# Patient Record
Sex: MALE | Race: WHITE | NOT HISPANIC OR LATINO
[De-identification: names, ages, dates, MRNs, and addresses within clinical notes are randomized per-mention and may not be internally consistent; named-entity substitution may affect disease eponyms.]

---

## 2017-06-08 ENCOUNTER — RESULT REVIEW (OUTPATIENT)
Age: 67
End: 2017-06-08

## 2018-12-31 ENCOUNTER — TRANSCRIPTION ENCOUNTER (OUTPATIENT)
Age: 68
End: 2018-12-31

## 2019-01-10 ENCOUNTER — TRANSCRIPTION ENCOUNTER (OUTPATIENT)
Age: 69
End: 2019-01-10

## 2019-04-09 ENCOUNTER — TRANSCRIPTION ENCOUNTER (OUTPATIENT)
Age: 69
End: 2019-04-09

## 2019-04-23 ENCOUNTER — TRANSCRIPTION ENCOUNTER (OUTPATIENT)
Age: 69
End: 2019-04-23

## 2019-10-21 ENCOUNTER — APPOINTMENT (OUTPATIENT)
Dept: OTOLARYNGOLOGY | Facility: CLINIC | Age: 69
End: 2019-10-21

## 2020-04-25 ENCOUNTER — MESSAGE (OUTPATIENT)
Age: 70
End: 2020-04-25

## 2020-07-17 ENCOUNTER — APPOINTMENT (OUTPATIENT)
Dept: UROLOGY | Facility: CLINIC | Age: 70
End: 2020-07-17
Payer: COMMERCIAL

## 2020-07-17 VITALS
RESPIRATION RATE: 15 BRPM | TEMPERATURE: 96.1 F | WEIGHT: 235 LBS | HEIGHT: 68 IN | OXYGEN SATURATION: 96 % | BODY MASS INDEX: 35.61 KG/M2 | SYSTOLIC BLOOD PRESSURE: 144 MMHG | DIASTOLIC BLOOD PRESSURE: 82 MMHG | HEART RATE: 88 BPM

## 2020-07-17 DIAGNOSIS — Z87.438 PERSONAL HISTORY OF OTHER DISEASES OF MALE GENITAL ORGANS: ICD-10-CM

## 2020-07-17 DIAGNOSIS — K46.9 UNSPECIFIED ABDOMINAL HERNIA W/OUT OBSTRUCTION OR GANGRENE: ICD-10-CM

## 2020-07-17 PROCEDURE — 99204 OFFICE O/P NEW MOD 45 MIN: CPT

## 2020-07-17 NOTE — PHYSICAL EXAM
[General Appearance - Well Nourished] : well nourished [General Appearance - Well Developed] : well developed [Normal Appearance] : normal appearance [Edema] : no peripheral edema [General Appearance - In No Acute Distress] : no acute distress [Well Groomed] : well groomed [Respiration, Rhythm And Depth] : normal respiratory rhythm and effort [Exaggerated Use Of Accessory Muscles For Inspiration] : no accessory muscle use [Costovertebral Angle Tenderness] : no ~M costovertebral angle tenderness [Abdomen Soft] : soft [Abdomen Tenderness] : non-tender [Urinary Bladder Findings] : the bladder was normal on palpation [Urethral Meatus] : meatus normal [Penis Abnormality] : normal uncircumcised penis [No Prostate Nodules] : no prostate nodules [Testes Mass (___cm)] : there were no testicular masses [Prostate Tenderness] : the prostate was not tender [Scrotum] : the scrotum was normal [Prostate Enlarged] : was enlarged [Normal Station and Gait] : the gait and station were normal for the patient's age [] : no rash [Oriented To Time, Place, And Person] : oriented to person, place, and time [No Focal Deficits] : no focal deficits [Not Anxious] : not anxious [Mood] : the mood was normal [Affect] : the affect was normal [Inguinal Lymph Nodes Enlarged Bilaterally] : inguinal

## 2020-07-17 NOTE — HISTORY OF PRESENT ILLNESS
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 He has long-standing history of BPH. He has previous history of recurrent UTIs and voiding symptoms. In 2018, he underwent urolift procedure which initially helped with urinary flow. Now again his urinary flow is slow and he has noticed recurrence of urinary odor. Nocturia is minimal. Residual urine today was minimal. In 2017, PSA level was 1.9. Creatinine was 1.3. Multiple urine cultures previously showed Escherichia coli. Also he has mild erectile dysfunction and he is interested in using medications which could improve symptoms. He is otherwise healthy.

## 2020-07-17 NOTE — ASSESSMENT
[FreeTextEntry1] : Except for slow urinary stream, he does not have significant other voiding symptoms. Residual urine is minimal. We can hold off on using alpha-blocker therapy for now. Based on previous cultures, Bactrim was prescribed. Urine culture will be sent. We discussed the underlying mechanism for erections, pathophysiology of erectile dysfunction (ED) and treatment options. Role of smoking, diabetes, hypertension, hyperlipidemia, coronary artery disease and treatment for benign and malignant prostate conditions was discussed as some of the more common causes of ED. Exercise, weight loss and a healthy lifestyle can be beneficial. We discussed testosterone (T) and its possible link to increased desire for sexual activity, feeling energetic, muscle mass, etc. Low T as a cause for ED is less clear. Mechanism by which PDE-5 inhibitors improve erections was discussed. Medications in this category include Viagra, Levitra, Staxyn, Cialis and Stendra. As needed versus daily dosing was discussed. For now, he can start with generic Viagra starting at 40 mg. Followup in a few months to reassess response.

## 2020-07-17 NOTE — REVIEW OF SYSTEMS
[Shortness Of Breath] : shortness of breath [Urine Infection (bladder/kidney)] : bladder/kidney infection [Urine retention] : urine retention [Joint Pain] : joint pain [Negative] : Heme/Lymph [FreeTextEntry3] : Dribbling of urine

## 2020-07-19 LAB
APPEARANCE: ABNORMAL
BACTERIA: ABNORMAL
BILIRUBIN URINE: NEGATIVE
BLOOD URINE: NEGATIVE
COLOR: YELLOW
GLUCOSE QUALITATIVE U: NEGATIVE
HYALINE CASTS: 2 /LPF
KETONES URINE: NEGATIVE
LEUKOCYTE ESTERASE URINE: ABNORMAL
MICROSCOPIC-UA: NORMAL
NITRITE URINE: NEGATIVE
PH URINE: 6
PROTEIN URINE: NORMAL
RED BLOOD CELLS URINE: 1 /HPF
SPECIFIC GRAVITY URINE: 1.03
SQUAMOUS EPITHELIAL CELLS: 3 /HPF
UROBILINOGEN URINE: NORMAL
WHITE BLOOD CELLS URINE: 30 /HPF

## 2020-07-20 ENCOUNTER — APPOINTMENT (OUTPATIENT)
Dept: ULTRASOUND IMAGING | Facility: CLINIC | Age: 70
End: 2020-07-20
Payer: COMMERCIAL

## 2020-07-20 ENCOUNTER — OUTPATIENT (OUTPATIENT)
Dept: OUTPATIENT SERVICES | Facility: HOSPITAL | Age: 70
LOS: 1 days | End: 2020-07-20

## 2020-07-20 ENCOUNTER — TRANSCRIPTION ENCOUNTER (OUTPATIENT)
Age: 70
End: 2020-07-20

## 2020-07-20 DIAGNOSIS — N39.0 URINARY TRACT INFECTION, SITE NOT SPECIFIED: ICD-10-CM

## 2020-07-20 PROCEDURE — 76775 US EXAM ABDO BACK WALL LIM: CPT | Mod: 26

## 2020-07-23 LAB — BACTERIA UR CULT: ABNORMAL

## 2020-08-06 LAB — BACTERIA UR CULT: ABNORMAL

## 2020-08-11 ENCOUNTER — APPOINTMENT (OUTPATIENT)
Dept: ORTHOPEDIC SURGERY | Facility: CLINIC | Age: 70
End: 2020-08-11
Payer: COMMERCIAL

## 2020-08-11 VITALS
TEMPERATURE: 98 F | SYSTOLIC BLOOD PRESSURE: 127 MMHG | BODY MASS INDEX: 36.37 KG/M2 | WEIGHT: 240 LBS | DIASTOLIC BLOOD PRESSURE: 76 MMHG | HEART RATE: 76 BPM | HEIGHT: 68 IN

## 2020-08-11 DIAGNOSIS — M75.42 IMPINGEMENT SYNDROME OF LEFT SHOULDER: ICD-10-CM

## 2020-08-11 PROCEDURE — 73030 X-RAY EXAM OF SHOULDER: CPT | Mod: LT

## 2020-08-11 PROCEDURE — 99203 OFFICE O/P NEW LOW 30 MIN: CPT | Mod: 25

## 2020-08-11 PROCEDURE — 20610 DRAIN/INJ JOINT/BURSA W/O US: CPT | Mod: LT

## 2020-08-12 PROBLEM — M75.42 SHOULDER IMPINGEMENT, LEFT: Status: ACTIVE | Noted: 2020-08-11

## 2020-08-12 NOTE — HISTORY OF PRESENT ILLNESS
[de-identified] : 68 y/o RHD male who works in [place] presents with left shoulder pain for many years ago with worsening pain for about the last 3 months. He denies any injury or trauma. He has decreased ROM. He has no pain at rest and increases with certain motions. The pain does NOT keep him up at night. He was taking Advil which did not help. He does some neck issues which does cause some tingling in his fingers. He denies any other deficits.

## 2020-08-12 NOTE — DISCUSSION/SUMMARY
[de-identified] : 70 y/o male with left shoulder pain secondary to shoulder impingement/bursitis. A lengthy discussion was held regarding the patient’s condition and treatment options including all risks, benefits, prognosis and outcomes of each were discussed in detail. The patient would like to continue with conservative management at this time and was advised on the use of NSAIDS for pain control, icing, activity modification. We also performed a cortisone injection today to both the glenohumeral and subacromial spaces. He will under PT and was given an Rx today. The patient will contact me if there are any concerns otherwise follow up will be on a prn basis. He will call in 4-8 weeks depending on his improvement. If he has continued symptoms we will order an MRI for him. The patient express understanding and all questions were answered.\par

## 2020-08-12 NOTE — PHYSICAL EXAM
[UE] : Sensory: Intact in bilateral upper extremities [B.R.] : biceps 2+ and symmetric bilaterally [Rad] : radial 2+ and symmetric bilaterally [de-identified] : Pt is pleasant 68 yo male, AAOx3 with no apparent distress, 36 BMI.  Physical examination of both shoulders reveals normal contours with no deformity, skin intact, with no signs of infection, no erythema, no swelling, no discoloration, no distal lymphedema, no patholaxity, no muscle atrophy noted. No tenderness to palpation. ROM of left shoulder reveals forward flexion to 130°,  external rotation 35°,  IR to L1. Motor strength 5/5 in ER, IR, and FF in the scapular plane without pain. Speeds negative. No neurological deficits noted.\par \par  [de-identified] : X-rays were ordered, obtained, and interpreted by me today: 4 views of the left shoulder demonstrate type III acromion, no arthritis, mild AC narrowing, diminished supraspinatus outlet, with no acute fracture or dislocation.\par

## 2020-08-12 NOTE — PROCEDURE
[de-identified] : After careful discussion regarding the risks versus benefits of a corticosteroid injection the patient has elected to proceed with that treatment. Under sterile conditions, the patient received  a left shoulder injection (half into the subacromial space and half into the glenohumeral joint space) with a total of 8 cc of half percent Marcaine without epinephrine and 2 cc of Betamethasone. The site was cleaned and a bandaid was applied. The patient tolerated the injection without problem.\par \par

## 2020-08-30 LAB — BACTERIA UR CULT: NORMAL

## 2020-09-02 LAB — BACTERIA UR CULT: NORMAL

## 2020-09-08 ENCOUNTER — TRANSCRIPTION ENCOUNTER (OUTPATIENT)
Age: 70
End: 2020-09-08

## 2020-09-16 ENCOUNTER — RESULT REVIEW (OUTPATIENT)
Age: 70
End: 2020-09-16

## 2020-10-04 ENCOUNTER — TRANSCRIPTION ENCOUNTER (OUTPATIENT)
Age: 70
End: 2020-10-04

## 2020-10-06 ENCOUNTER — TRANSCRIPTION ENCOUNTER (OUTPATIENT)
Age: 70
End: 2020-10-06

## 2020-10-08 ENCOUNTER — APPOINTMENT (OUTPATIENT)
Dept: UROLOGY | Facility: CLINIC | Age: 70
End: 2020-10-08
Payer: COMMERCIAL

## 2020-10-08 VITALS
HEIGHT: 68 IN | SYSTOLIC BLOOD PRESSURE: 119 MMHG | TEMPERATURE: 97.6 F | OXYGEN SATURATION: 93 % | WEIGHT: 240 LBS | DIASTOLIC BLOOD PRESSURE: 80 MMHG | HEART RATE: 78 BPM | RESPIRATION RATE: 14 BRPM | BODY MASS INDEX: 36.37 KG/M2

## 2020-10-08 PROCEDURE — 51701 INSERT BLADDER CATHETER: CPT

## 2020-10-08 PROCEDURE — 99213 OFFICE O/P EST LOW 20 MIN: CPT | Mod: 25

## 2020-10-08 RX ORDER — NITROFURANTOIN (MONOHYDRATE/MACROCRYSTALS) 25; 75 MG/1; MG/1
100 CAPSULE ORAL
Qty: 14 | Refills: 0 | Status: DISCONTINUED | COMMUNITY
Start: 2020-08-06 | End: 2020-10-08

## 2020-10-08 RX ORDER — SULFAMETHOXAZOLE AND TRIMETHOPRIM 800; 160 MG/1; MG/1
800-160 TABLET ORAL
Qty: 10 | Refills: 0 | Status: DISCONTINUED | COMMUNITY
Start: 2020-07-17 | End: 2020-10-08

## 2020-10-08 NOTE — PHYSICAL EXAM
[General Appearance - Well Developed] : well developed [General Appearance - Well Nourished] : well nourished [Normal Appearance] : normal appearance [Well Groomed] : well groomed [General Appearance - In No Acute Distress] : no acute distress [Abdomen Soft] : soft [Abdomen Tenderness] : non-tender [Costovertebral Angle Tenderness] : no ~M costovertebral angle tenderness [Urethral Meatus] : meatus normal [Penis Abnormality] : normal uncircumcised penis [Urinary Bladder Findings] : the bladder was normal on palpation [Scrotum] : the scrotum was normal [Testes Mass (___cm)] : there were no testicular masses [Prostate Tenderness] : the prostate was not tender [No Prostate Nodules] : no prostate nodules [Prostate Enlarged] : was enlarged [FreeTextEntry1] : COLTON done July 2020 [] : no respiratory distress [Respiration, Rhythm And Depth] : normal respiratory rhythm and effort [Exaggerated Use Of Accessory Muscles For Inspiration] : no accessory muscle use [Oriented To Time, Place, And Person] : oriented to person, place, and time [Affect] : the affect was normal [Mood] : the mood was normal [Not Anxious] : not anxious

## 2020-10-08 NOTE — ASSESSMENT
[FreeTextEntry1] : Under sterile condition, only a 12 Fr catheter could be passed and urine was sent for culture. He will be scheduled for cystoscopy. We will discuss antibiotic therapy on the phone when culture results come back. Will consider low dose, longer term antibiotics.

## 2020-10-12 LAB — BACTERIA UR CULT: ABNORMAL

## 2020-10-21 ENCOUNTER — TRANSCRIPTION ENCOUNTER (OUTPATIENT)
Age: 70
End: 2020-10-21

## 2020-10-22 ENCOUNTER — APPOINTMENT (OUTPATIENT)
Dept: UROLOGY | Facility: CLINIC | Age: 70
End: 2020-10-22
Payer: COMMERCIAL

## 2020-10-22 VITALS
HEART RATE: 77 BPM | WEIGHT: 240 LBS | HEIGHT: 68 IN | DIASTOLIC BLOOD PRESSURE: 63 MMHG | SYSTOLIC BLOOD PRESSURE: 131 MMHG | TEMPERATURE: 97.6 F | OXYGEN SATURATION: 95 % | RESPIRATION RATE: 15 BRPM | BODY MASS INDEX: 36.37 KG/M2

## 2020-10-22 PROCEDURE — 52281 CYSTOSCOPY AND TREATMENT: CPT

## 2020-10-22 PROCEDURE — 99072 ADDL SUPL MATRL&STAF TM PHE: CPT

## 2021-05-12 ENCOUNTER — TRANSCRIPTION ENCOUNTER (OUTPATIENT)
Age: 71
End: 2021-05-12

## 2021-08-05 ENCOUNTER — NON-APPOINTMENT (OUTPATIENT)
Age: 71
End: 2021-08-05

## 2021-08-06 ENCOUNTER — NON-APPOINTMENT (OUTPATIENT)
Age: 71
End: 2021-08-06

## 2021-08-06 ENCOUNTER — APPOINTMENT (OUTPATIENT)
Dept: SPINE | Facility: CLINIC | Age: 71
End: 2021-08-06
Payer: COMMERCIAL

## 2021-08-06 VITALS
BODY MASS INDEX: 36.37 KG/M2 | DIASTOLIC BLOOD PRESSURE: 84 MMHG | OXYGEN SATURATION: 95 % | WEIGHT: 240 LBS | SYSTOLIC BLOOD PRESSURE: 143 MMHG | HEIGHT: 68 IN | HEART RATE: 67 BPM

## 2021-08-06 PROCEDURE — 99203 OFFICE O/P NEW LOW 30 MIN: CPT

## 2021-08-06 NOTE — HISTORY OF PRESENT ILLNESS
[< 3 months] : less than 3 months [FreeTextEntry1] : Recurrent Neck and Left arm pain x 1 week [de-identified] : Mr. Jimmie Perez is here for comprehensive evaluation of Cervical Spine. He states that his initial \par symptoms started in January in 2019. He denies any Neck injury. He reports that his pain was resolved and managed with gabapentin and physical therapy over the past few years. Today he reports recurrent neck and left arm pain over the past week. He describes the pain as deep sharp;LEft arm pain and chest radiation and numbness in fingers. Pain is continuous ; Pain Rate 5/10 He currently takes Aleve for pain control. He reports that the pain is impacting his sleep with minimal relief. he reports that he does not have a PCP and had not had a workup for the pain that radiates into his chest. he has not had PT and does not wish to start at this time for fear that PT will exacerbate his pain. \par \par His neurological exam is normal\par  \par \par

## 2021-08-06 NOTE — ASSESSMENT
[FreeTextEntry1] : 70 year old man presenting with cervical radiculopathy\par \par MRI Cervical spine to R/O Cervical disc herniation\par Cervical Xray to rule cervical instability\par \par Does not want to start PT. \par Instructed to follow up with PCP for comprehensive workup.\par  Medrol does pack Prescribed\par Medication Purpose, Action, Possible interactions, Side effects as well as adverse effects explain to pt.\par Teachback Protocol implemented.\par Follow up with Dr. Jens Colon when Completed\par \par

## 2021-08-06 NOTE — PHYSICAL EXAM
[General Appearance - Alert] : alert [General Appearance - In No Acute Distress] : in no acute distress [Oriented To Time, Place, And Person] : oriented to person, place, and time [Impaired Insight] : insight and judgment were intact [Cranial Nerves Optic (II)] : visual acuity intact bilaterally,  pupils equal round and reactive to light [Cranial Nerves Oculomotor (III)] : extraocular motion intact [Cranial Nerves Trigeminal (V)] : facial sensation intact symmetrically [Cranial Nerves Facial (VII)] : face symmetrical [Cranial Nerves Vestibulocochlear (VIII)] : hearing was intact bilaterally [Cranial Nerves Glossopharyngeal (IX)] : tongue and palate midline [Cranial Nerves Accessory (XI - Cranial And Spinal)] : head turning and shoulder shrug symmetric [Cranial Nerves Hypoglossal (XII)] : there was no tongue deviation with protrusion [Sclera] : the sclera and conjunctiva were normal [] : no respiratory distress [Heart Rate And Rhythm] : heart rate was normal and rhythm regular [Abdomen Soft] : soft [Abnormal Walk] : normal gait [Skin Color & Pigmentation] : normal skin color and pigmentation [Neck Appearance] : the appearance of the neck was normal

## 2021-08-12 ENCOUNTER — RESULT REVIEW (OUTPATIENT)
Age: 71
End: 2021-08-12

## 2021-08-12 ENCOUNTER — APPOINTMENT (OUTPATIENT)
Dept: SPINE | Facility: CLINIC | Age: 71
End: 2021-08-12
Payer: COMMERCIAL

## 2021-08-12 VITALS
OXYGEN SATURATION: 95 % | SYSTOLIC BLOOD PRESSURE: 135 MMHG | HEART RATE: 87 BPM | DIASTOLIC BLOOD PRESSURE: 88 MMHG | BODY MASS INDEX: 36.37 KG/M2 | HEIGHT: 68 IN | WEIGHT: 240 LBS

## 2021-08-12 PROCEDURE — 99213 OFFICE O/P EST LOW 20 MIN: CPT

## 2021-08-13 NOTE — DATA REVIEWED
[de-identified] : Cervical MRI from [facility]   8/7/2021  [de-identified] : Cervical Xrays from [facility]

## 2021-08-13 NOTE — REASON FOR VISIT
[Follow-Up: _____] : a [unfilled] follow-up visit [Family Member] : family member [FreeTextEntry1] : \par Mr Perez is a 70-year-old gentleman here for left sided cervical radiculopathy.  He had an episode in 2017 of the same pain and now has returned about three weeks ago.  Positive coordination issues.    No gait disturbances.  No urine or bowel dysfunction.  PT  was effective in the past in 2019.   He is not  on  Gabapentin.   A Medrol dose ira was not effective.  His pain is progressing, rated a 7/10.  He has difficulty sleeping.  Even at rest the pain is severe. He also feels his left arm is weaker. An MRI of the cervical spine shows a developmentally narrow canal with disc osteophyte complexes resulting in moderate central and foraminal stenosis at C3-C7. On the left at C6-6-7 there is more significant left-sided cord impingement. There is T2 signal change at the C3-4 level. He is also noted dysphasia with certain thin liquids.

## 2021-08-13 NOTE — ASSESSMENT
[FreeTextEntry1] : Left sided cervical radiculopathy .   On neurologic examination he has limited ROM of neck and a positive ojeda sign on the right side.   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  Surgery was recommended.  He understands that any weakness that occurs can be permanent.  A multilevel laminectomy was discussed . Risks and  limited ROM postop was explained.   He will need a barium swallow test  prior to surgery due to his swallowing difficulty, and consideration for a posterior fusion was discussed.  He will return once the images are complete.  Gabapentin was ordered at 300 mg three times a day, side effects discussed.

## 2021-08-13 NOTE — PHYSICAL EXAM
[Person] : oriented to person [Place] : oriented to place [Time] : oriented to time [Short Term Intact] : short term memory intact [Remote Intact] : remote memory intact [Span Intact] : the attention span was normal [Concentration Intact] : normal concentrating ability [Fluency] : fluency intact [Comprehension] : comprehension intact [Current Events] : adequate knowledge of current events [Past History] : adequate knowledge of personal past history [Vocabulary] : adequate range of vocabulary [Cranial Nerves Optic (II)] : visual acuity intact bilaterally,  pupils equal round and reactive to light [Cranial Nerves Oculomotor (III)] : extraocular motion intact [Cranial Nerves Trigeminal (V)] : facial sensation intact symmetrically [Cranial Nerves Facial (VII)] : face symmetrical [Cranial Nerves Vestibulocochlear (VIII)] : hearing was intact bilaterally [Cranial Nerves Glossopharyngeal (IX)] : tongue and palate midline [Cranial Nerves Accessory (XI - Cranial And Spinal)] : head turning and shoulder shrug symmetric [Cranial Nerves Hypoglossal (XII)] : there was no tongue deviation with protrusion [Motor Tone] : muscle tone was normal in all four extremities [No Muscle Atrophy] : normal bulk in all four extremities [Sensation Tactile Decrease] : light touch was intact [Abnormal Walk] : normal gait [Balance] : balance was intact [2+] : Ankle jerk left 2+ [Kelly] : Kelly's sign was demonstrated [No Tenderness to Palpation] : no spine tenderness on palpation [Past-pointing] : there was no past-pointing [Tremor] : no tremor present [FreeTextEntry6] : mild left triceps and  weakness 4/5 [L'Hermitte's] : neck flexion did not produce tingling down the spine/arms [Spurling's - Opposite Side] : Negative Spurling's on opposite side [Spurling's Same Side] : Negative Spurling's on same side [FreeTextEntry1] : significantly limited extension

## 2021-08-14 ENCOUNTER — APPOINTMENT (OUTPATIENT)
Dept: MRI IMAGING | Facility: CLINIC | Age: 71
End: 2021-08-14

## 2021-08-14 ENCOUNTER — APPOINTMENT (OUTPATIENT)
Dept: RADIOLOGY | Facility: CLINIC | Age: 71
End: 2021-08-14

## 2021-08-16 ENCOUNTER — OUTPATIENT (OUTPATIENT)
Dept: OUTPATIENT SERVICES | Facility: HOSPITAL | Age: 71
LOS: 1 days | End: 2021-08-16
Payer: COMMERCIAL

## 2021-08-16 ENCOUNTER — APPOINTMENT (OUTPATIENT)
Dept: CT IMAGING | Facility: CLINIC | Age: 71
End: 2021-08-16
Payer: COMMERCIAL

## 2021-08-16 DIAGNOSIS — R20.0 ANESTHESIA OF SKIN: ICD-10-CM

## 2021-08-16 DIAGNOSIS — M54.12 RADICULOPATHY, CERVICAL REGION: ICD-10-CM

## 2021-08-16 PROCEDURE — 72125 CT NECK SPINE W/O DYE: CPT

## 2021-08-16 PROCEDURE — 72125 CT NECK SPINE W/O DYE: CPT | Mod: 26

## 2021-08-18 ENCOUNTER — NON-APPOINTMENT (OUTPATIENT)
Age: 71
End: 2021-08-18

## 2021-08-18 ENCOUNTER — OUTPATIENT (OUTPATIENT)
Dept: OUTPATIENT SERVICES | Facility: HOSPITAL | Age: 71
LOS: 1 days | End: 2021-08-18
Payer: COMMERCIAL

## 2021-08-18 DIAGNOSIS — R20.0 ANESTHESIA OF SKIN: ICD-10-CM

## 2021-08-18 DIAGNOSIS — M54.12 RADICULOPATHY, CERVICAL REGION: ICD-10-CM

## 2021-08-18 PROCEDURE — 74230 X-RAY XM SWLNG FUNCJ C+: CPT | Mod: 26

## 2021-08-18 PROCEDURE — 92611 MOTION FLUOROSCOPY/SWALLOW: CPT

## 2021-08-18 PROCEDURE — 74230 X-RAY XM SWLNG FUNCJ C+: CPT

## 2021-08-18 NOTE — ASSESSMENT
[FreeTextEntry1] : MODIFIED BARIUM SWALLOW STUDY     \par \par Date of Report: 08/18/2021     \par Date of Evaluation: 08/18/2021     \par Patient Name: Aung Perez \par YOB: 1950 \par Date of Onset: ongoing \par Primary Diagnosis: Dysphagia      \par Treatment Diagnosis: Dysphagia     \par Referring Physician: Dr. Jens Colon \par \par Impressions/Results:      \par 1. There was no penetration and/or aspiration pre/during/post swallow across all PO. \par 2. Patient demonstrated coughing episode when consuming pill with thin liquids, likely due to observed inhalation with discoordination of breathing/swallowing. Consider taking pills via applesauce. \par 3. There was adequate pharyngeal clearance post swallow across all PO. \par \par Recommendations:     \par 1. Regular solids with thin liquids. \par 2. Aspiration precautions. \par 3. Safe swallowing guidelines: position upright 90 degrees, small bite/sip, complete full mastication of solid textures, pace meal slowly, remain upright 15-30 minutes post meal.  \par 4. Whole pills in applesauce. \par 5. Ongoing oral care. \par 6. Follow up with referring physician, as directed. \par \par History: This 70 year old male was seen this morning for a Modified Barium Swallow Study to: _x__rule out aspiration; __x_assess for diet texture change as appropriate; and/or _x__ explore positional strategies and/or compensatory techniques to eliminate penetration/aspiration. The physician ordered this procedure because he wants the patient to meet their nutrition/hydration needs by mouth without compromising respiratory status.    \par \par The patient independently arrived to today’s study and served as a reliable informant. As per charting and patient report, patient is undergoing work up for left sided cervical radiculopathy. Patient reported he has been experiencing intermittent difficulty swallowing thin liquids marked by intermittent coughing episodes upon consecutive sips or when inattentive. Patient reported this to his MD, who requested patient receive MBS for further assessment of oropharyngeal swallow mechanism prior to planned surgical intervention with neurosurgery team. Patient denied change in breathing or vocal quality, pharyngeal stasis, odynophagia, and history of pneumonia, unintentional weight loss, and reflux. Patient reported he has continued to consume a regular solid diet with thin liquids with being more attentive to deglutition. Patient denied difficulty swallowing pills. \par \par Current Nutritional Intake:  \par 1. solids: regular \par 2. 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 Patient was ambulatory, but requested to sit in MBS chair secondary to unsteadiness and was placed in lateral view. Patient was able to  anterior plane of view for esophageal screen. Patient was awake and cooperative and able to follow simple commands for purposes of study. Patient’s vocal quality/intensity was WFL. Patient was tolerating room air with adequate secretion management. Patient denied pain pre and post study. \par \par Consistencies Administered:      \par Solids: _x_ Regular solid __Soft solid _x__Puree      \par Liquids: _x_ Thin _x_ Nectar Thick __Honey Thick      \par _x_ single cup sips _x_ consecutive cup sips   __ teaspoon \par \par SUMMARY & IMPRESSION  \par \par Preliminary Fluoroscopy reveals:     \par 1. Patient demonstrated adequate velopharyngeal closure.   \par 2. Patient demonstrated a timely pharyngeal swallow trigger with adequate hyolaryngeal elevation/excursion.    \par \par Videofluoroscopic Evaluation Reveals:   \par 1. Mild oral dysphagia when given thin liquids marked by adequate retrieval and containment, reduced bolus cohesion resulting in premature spillage to hypopharynx, timely posterior transfer and oral transit, and adequate oral clearance post swallow. \par 2. Functional oral phase when given nectar thick liquids, puree, and regular solids marked by adequate retrieval and containment, timely mastication of solids, adequate bolus cohesion, timely posterior transfer and oral transit, and adequate oral clearance post swallow.  \par 3. Functional pharyngeal phase when given thin liquids, nectar thick liquids, puree, and regular solids marked by timely pharyngeal swallow trigger (status post premature spillage to the level of the pyriforms with thin liquids), adequate BOT retraction, adequate hyolaryngeal elevation/excursion, and complete epiglottic retroflexion. There was no penetration and/or aspiration pre/during/post swallow. There was trace pharyngeal residue at the level of the valleculae noted with regular solids, which patient exhibited immediate spontaneous additional dry swallow with adequate clearance. There was adequate pharyngeal clearance post swallow when given thin liquids, nectar thick liquids, and puree.  \par \par Esophageal screen: Patient was given a regular solid bolus and barium tablet. Both the regular solid bolus and tablet were observed to course from the oral cavity to the level of the stomach without hold up, per radiologist report. This cannot be considered a formal evaluation of the esophagus. It should be noted that patient exhibited coughing episode when consuming barium tablet with water. Patient was observed to inhale upon consumption of pill, which likely led to discoordination of breathing/swallowing and suspected aspiration event. Patient reported this normally does not occur when he is consuming pills. Patient was advised to monitor closely at home and consider implementing whole pills in applesauce if reoccurrence. Patient verbalized understanding and is in agreement with plan of care. \par \par Aspiration - Penetration Scale: 1- thin liquids via single/consecutive cup sips, nectar thick liquids, puree, and regular solids \par \par Aspiration - Penetration Scale (Milad et al Dysphagia 11:93-98 (April 1996), Aspiration-Penetration Scale)     \par 1. Material does not enter the airway     \par 2. Material enters the airway, remains above the vocal folds, and is ejected from the airway     \par 3. Material enters the airway, remains above the vocal folds, and is not ejected     \par 4. Material enters the airway, contacts the vocal folds, and is ejected from the airway     \par 5. Material enters the airway, contacts the vocal folds, and is not ejected from the airway     \par 6. Material enters the airway, passes below the vocal folds and is ejected into the larynx or out of the airway     \par 7. Material enters the airway, passes below the vocal folds, and is not ejected from the trachea despite effort     \par 8. Material enters the airway, passes below the vocal folds, and no effort is made to eject     \par \par      \par The above results and recommendations have been discussed in length with the patient, who verbalized understanding and is in agreement with plan of care. Should you have any additional concerns, please contact the Center at [phone].     \par \par      \par \par Zenaida Mcmanus M.S., CCC-SLP     \par Speech-Language Pathologist     \par [facility] [place].

## 2021-08-26 ENCOUNTER — EMERGENCY (EMERGENCY)
Facility: HOSPITAL | Age: 71
LOS: 1 days | Discharge: HOME | End: 2021-08-26
Attending: EMERGENCY MEDICINE
Payer: COMMERCIAL

## 2021-08-26 VITALS
SYSTOLIC BLOOD PRESSURE: 151 MMHG | HEART RATE: 99 BPM | TEMPERATURE: 99 F | DIASTOLIC BLOOD PRESSURE: 81 MMHG | OXYGEN SATURATION: 99 % | RESPIRATION RATE: 17 BRPM

## 2021-08-26 VITALS
SYSTOLIC BLOOD PRESSURE: 139 MMHG | OXYGEN SATURATION: 98 % | DIASTOLIC BLOOD PRESSURE: 78 MMHG | HEART RATE: 78 BPM | TEMPERATURE: 98 F | RESPIRATION RATE: 17 BRPM

## 2021-08-26 DIAGNOSIS — Z90.49 ACQUIRED ABSENCE OF OTHER SPECIFIED PARTS OF DIGESTIVE TRACT: Chronic | ICD-10-CM

## 2021-08-26 LAB
ALBUMIN SERPL ELPH-MCNC: 4.3 G/DL — SIGNIFICANT CHANGE UP (ref 3.3–5)
ALP SERPL-CCNC: 85 U/L — SIGNIFICANT CHANGE UP (ref 40–120)
ALT FLD-CCNC: 21 U/L — SIGNIFICANT CHANGE UP (ref 10–45)
ANION GAP SERPL CALC-SCNC: 14 MMOL/L — SIGNIFICANT CHANGE UP (ref 5–17)
APPEARANCE UR: CLEAR — SIGNIFICANT CHANGE UP
AST SERPL-CCNC: 17 U/L — SIGNIFICANT CHANGE UP (ref 10–40)
BACTERIA # UR AUTO: ABNORMAL
BASOPHILS # BLD AUTO: 0.05 K/UL — SIGNIFICANT CHANGE UP (ref 0–0.2)
BASOPHILS NFR BLD AUTO: 0.6 % — SIGNIFICANT CHANGE UP (ref 0–2)
BILIRUB SERPL-MCNC: 0.5 MG/DL — SIGNIFICANT CHANGE UP (ref 0.2–1.2)
BILIRUB UR-MCNC: NEGATIVE — SIGNIFICANT CHANGE UP
BUN SERPL-MCNC: 14 MG/DL — SIGNIFICANT CHANGE UP (ref 7–23)
CALCIUM SERPL-MCNC: 9.4 MG/DL — SIGNIFICANT CHANGE UP (ref 8.4–10.5)
CHLORIDE SERPL-SCNC: 103 MMOL/L — SIGNIFICANT CHANGE UP (ref 96–108)
CO2 SERPL-SCNC: 21 MMOL/L — LOW (ref 22–31)
COLOR SPEC: YELLOW — SIGNIFICANT CHANGE UP
CREAT SERPL-MCNC: 1.27 MG/DL — SIGNIFICANT CHANGE UP (ref 0.5–1.3)
DIFF PNL FLD: NEGATIVE — SIGNIFICANT CHANGE UP
EOSINOPHIL # BLD AUTO: 0.25 K/UL — SIGNIFICANT CHANGE UP (ref 0–0.5)
EOSINOPHIL NFR BLD AUTO: 3.2 % — SIGNIFICANT CHANGE UP (ref 0–6)
EPI CELLS # UR: 2 /HPF — SIGNIFICANT CHANGE UP
GLUCOSE SERPL-MCNC: 95 MG/DL — SIGNIFICANT CHANGE UP (ref 70–99)
GLUCOSE UR QL: NEGATIVE — SIGNIFICANT CHANGE UP
HCT VFR BLD CALC: 46.7 % — SIGNIFICANT CHANGE UP (ref 39–50)
HGB BLD-MCNC: 15.1 G/DL — SIGNIFICANT CHANGE UP (ref 13–17)
HYALINE CASTS # UR AUTO: 6 /LPF — HIGH (ref 0–2)
IMM GRANULOCYTES NFR BLD AUTO: 0.3 % — SIGNIFICANT CHANGE UP (ref 0–1.5)
KETONES UR-MCNC: NEGATIVE — SIGNIFICANT CHANGE UP
LEUKOCYTE ESTERASE UR-ACNC: ABNORMAL
LIDOCAIN IGE QN: 22 U/L — SIGNIFICANT CHANGE UP (ref 7–60)
LYMPHOCYTES # BLD AUTO: 2.11 K/UL — SIGNIFICANT CHANGE UP (ref 1–3.3)
LYMPHOCYTES # BLD AUTO: 27.1 % — SIGNIFICANT CHANGE UP (ref 13–44)
MCHC RBC-ENTMCNC: 27.7 PG — SIGNIFICANT CHANGE UP (ref 27–34)
MCHC RBC-ENTMCNC: 32.3 GM/DL — SIGNIFICANT CHANGE UP (ref 32–36)
MCV RBC AUTO: 85.7 FL — SIGNIFICANT CHANGE UP (ref 80–100)
MONOCYTES # BLD AUTO: 0.64 K/UL — SIGNIFICANT CHANGE UP (ref 0–0.9)
MONOCYTES NFR BLD AUTO: 8.2 % — SIGNIFICANT CHANGE UP (ref 2–14)
NEUTROPHILS # BLD AUTO: 4.72 K/UL — SIGNIFICANT CHANGE UP (ref 1.8–7.4)
NEUTROPHILS NFR BLD AUTO: 60.6 % — SIGNIFICANT CHANGE UP (ref 43–77)
NITRITE UR-MCNC: NEGATIVE — SIGNIFICANT CHANGE UP
NRBC # BLD: 0 /100 WBCS — SIGNIFICANT CHANGE UP (ref 0–0)
PH UR: 6 — SIGNIFICANT CHANGE UP (ref 5–8)
PLATELET # BLD AUTO: 186 K/UL — SIGNIFICANT CHANGE UP (ref 150–400)
POTASSIUM SERPL-MCNC: 4.1 MMOL/L — SIGNIFICANT CHANGE UP (ref 3.5–5.3)
POTASSIUM SERPL-SCNC: 4.1 MMOL/L — SIGNIFICANT CHANGE UP (ref 3.5–5.3)
PROT SERPL-MCNC: 6.9 G/DL — SIGNIFICANT CHANGE UP (ref 6–8.3)
PROT UR-MCNC: ABNORMAL
RBC # BLD: 5.45 M/UL — SIGNIFICANT CHANGE UP (ref 4.2–5.8)
RBC # FLD: 13.2 % — SIGNIFICANT CHANGE UP (ref 10.3–14.5)
RBC CASTS # UR COMP ASSIST: 1 /HPF — SIGNIFICANT CHANGE UP (ref 0–4)
SODIUM SERPL-SCNC: 138 MMOL/L — SIGNIFICANT CHANGE UP (ref 135–145)
SP GR SPEC: >1.05 (ref 1.01–1.02)
UROBILINOGEN FLD QL: NEGATIVE — SIGNIFICANT CHANGE UP
WBC # BLD: 7.79 K/UL — SIGNIFICANT CHANGE UP (ref 3.8–10.5)
WBC # FLD AUTO: 7.79 K/UL — SIGNIFICANT CHANGE UP (ref 3.8–10.5)
WBC UR QL: 58 /HPF — HIGH (ref 0–5)

## 2021-08-26 PROCEDURE — 74177 CT ABD & PELVIS W/CONTRAST: CPT | Mod: 26,MA

## 2021-08-26 PROCEDURE — 96374 THER/PROPH/DIAG INJ IV PUSH: CPT | Mod: XU

## 2021-08-26 PROCEDURE — 85025 COMPLETE CBC W/AUTO DIFF WBC: CPT

## 2021-08-26 PROCEDURE — 74177 CT ABD & PELVIS W/CONTRAST: CPT | Mod: MA

## 2021-08-26 PROCEDURE — 80053 COMPREHEN METABOLIC PANEL: CPT

## 2021-08-26 PROCEDURE — 87186 SC STD MICRODIL/AGAR DIL: CPT

## 2021-08-26 PROCEDURE — 83690 ASSAY OF LIPASE: CPT

## 2021-08-26 PROCEDURE — 87086 URINE CULTURE/COLONY COUNT: CPT

## 2021-08-26 PROCEDURE — 81001 URINALYSIS AUTO W/SCOPE: CPT

## 2021-08-26 PROCEDURE — 99285 EMERGENCY DEPT VISIT HI MDM: CPT

## 2021-08-26 PROCEDURE — 99284 EMERGENCY DEPT VISIT MOD MDM: CPT | Mod: 25

## 2021-08-26 RX ORDER — CEFDINIR 250 MG/5ML
1 POWDER, FOR SUSPENSION ORAL
Qty: 20 | Refills: 0
Start: 2021-08-26 | End: 2021-09-04

## 2021-08-26 RX ORDER — ACETAMINOPHEN 500 MG
975 TABLET ORAL ONCE
Refills: 0 | Status: COMPLETED | OUTPATIENT
Start: 2021-08-26 | End: 2021-08-26

## 2021-08-26 RX ORDER — CEFTRIAXONE 500 MG/1
1000 INJECTION, POWDER, FOR SOLUTION INTRAMUSCULAR; INTRAVENOUS ONCE
Refills: 0 | Status: COMPLETED | OUTPATIENT
Start: 2021-08-26 | End: 2021-08-26

## 2021-08-26 RX ORDER — IBUPROFEN 200 MG
600 TABLET ORAL ONCE
Refills: 0 | Status: COMPLETED | OUTPATIENT
Start: 2021-08-26 | End: 2021-08-26

## 2021-08-26 RX ADMIN — Medication 975 MILLIGRAM(S): at 18:22

## 2021-08-26 RX ADMIN — CEFTRIAXONE 100 MILLIGRAM(S): 500 INJECTION, POWDER, FOR SOLUTION INTRAMUSCULAR; INTRAVENOUS at 21:53

## 2021-08-26 RX ADMIN — Medication 600 MILLIGRAM(S): at 18:22

## 2021-08-26 NOTE — ED PROVIDER NOTE - CLINICAL SUMMARY MEDICAL DECISION MAKING FREE TEXT BOX
70M p/w LLQ pain, hx of appendicitis.  Exam as above.  CT w/ evidence of epiploid appendagitis.  Pt. has improvement in pain.  UA w/ UTI, remainder of labs largely unremarkable.  Will dc w/ abx and conservative management.

## 2021-08-26 NOTE — ED PROVIDER NOTE - OBJECTIVE STATEMENT
70M w/ PSHx of appendectomy p/w LLQ abd pain since two days.  Went to sleep but pain woke him up.  Denies associated n/v, but has diarrhea/soft stools.  Denies hx of hernia.  No change in urination or BM.  Has not tried any OTC meds.  Pain is sharp and non-radiating.  Denies hx of kidney stones. 70M w/ PSHx of appendectomy p/w LLQ abd pain since two days.  Went to sleep but pain woke him up.  Denies associated n/v, but has diarrhea/soft stools.  Denies hx of hernia.  No change in urination or BM.  Has not tried any OTC meds.  Pain is sharp and non-radiating.  Denies hx of kidney stones.    Attendinyo male presents with LLQ pain for 2 days.  Pain has been worsening since .  No fever or chills.  no nausea or vomiting.  pain worse with movement.  pain was sharp and worse with movement.  pain is now resolved.

## 2021-08-26 NOTE — ED PROVIDER NOTE - NS ED ROS FT
General: denies fever, chills, weight loss/weight gain.  HENT: denies nasal congestion, sore throat, rhinorrhea, ear pain.  Eyes: denies visual changes, blurred vision, eye discharge, eye redness.  Neck: denies neck pain, neck swelling.  CV: denies chest pain, palpitations.  Resp: denies difficulty breathing, cough.  Abdominal: +abd pain; denies nausea, vomiting, diarrhea, blood in stool, dark stool.  MSK: denies muscle aches, bony pain, leg pain, leg swelling.  Neuro: denies headaches, numbness, tingling, dizziness, lightheadedness.  Skin: denies rashes, cuts, bruises.  Hematologic: denies unexplained bruises.

## 2021-08-26 NOTE — ED PROVIDER NOTE - RAPID ASSESSMENT
70y M presents to the ED c/o worsening LLQ abd pain x4d. Non-radiating. Describes pain as sharp. No abd hx. Endorses diarrhea and small output of stool yesterday. Denies pain with urination or bowel movements. Denies n/v. Pt has not taken any pain medication. Not changed with eating. Denies f/c.     I, Jeny Bowles (Licha), have documented this rapid assessment note under the dictation of Charles Hernandez), which has been reviewed and affirmed to be accurate.  Patient was seen as a QPA patient. The patient will be seen and further worked up in the main emergency department and their care will be completed by the main emergency department team along with a thorough physical exam. Receiving team will follow up on labs, analgesia, any clinical imaging, reassess and disposition as clinically indicated, all decisions regarding the progression of care will be made at their discretion. 70y M presents to the ED c/o worsening LLQ abd pain x4d. Non-radiating. Describes pain as sharp. No abd hx. Endorses diarrhea and small output of stool yesterday. Denies pain with urination or bowel movements. Denies n/v. Pt has not taken any pain medication. Not changed with eating. Denies f/c.     I, Jeny Bowles (Scribe), have documented this rapid assessment note under the dictation of Charles Hernandez (PA), which has been reviewed and affirmed to be accurate.  Patient was seen as a QPA patient. The patient will be seen and further worked up in the main emergency department and their care will be completed by the main emergency department team along with a thorough physical exam. Receiving team will follow up on labs, analgesia, any clinical imaging, reassess and disposition as clinically indicated, all decisions regarding the progression of care will be made at their discretion.    Charles Hernandez (PA) note: This scribe's documentation has been prepared under my direction and personally reviewed by me. Pt to be sent to main ED for further evaluation - all orders placed to be followed by attending physician in main ED.*

## 2021-08-26 NOTE — ED PROVIDER NOTE - PROGRESS NOTE DETAILS
Joe PGY3 - CT read says epiploid appendicitis.  Radiology likely meant epiploid appendagitis.  Communicated this with the radiology resident, who confirmed and will addend the report.  UA shows UTI.  Pt. states he has frequent UTIs and pt.'s urologist unsure why this is.  Denies dysuria but states he has foul smelling urine which is c/w prior UTI.  Will tx here and rx the rest. Acerra:  pt with no pain at this time.  tolerating po well.

## 2021-08-26 NOTE — ED PROVIDER NOTE - PATIENT PORTAL LINK FT
You can access the FollowMyHealth Patient Portal offered by [facility] by registering at the following website: [email or web address]. By joining "[employer]’s FollowMyHealth portal, you will also be able to view your health information using other applications (apps) compatible with our system.

## 2021-08-26 NOTE — ED PROVIDER NOTE - PHYSICAL EXAMINATION
GENERAL: Patient awake alert NAD.  HEENT: NC/AT.  LUNGS: CTAB, no wheezes or crackles.  CARDIAC: RRR, no m/r/g.  ABDOMEN: Soft, LLQ TTP, ND, No rebound, guarding.  EXT: No edema. No calf tenderness. CV 2+DP/PT bilaterally.  MSK: No pain with movement, no deformities.  NEURO: A&Ox3. Moving all extremities.  SKIN: Warm and dry. No rash.  PSYCH: Normal affect.

## 2021-08-26 NOTE — ED ADULT NURSE NOTE - NSIMPLEMENTINTERV_GEN_ALL_ED
Implemented All Universal Safety Interventions:  [place] to call system. Call bell, personal items and telephone within reach. Instruct patient to call for assistance. Room bathroom lighting operational. Non-slip footwear when patient is off stretcher. Physically safe environment: no spills, clutter or unnecessary equipment. Stretcher in lowest position, wheels locked, appropriate side rails in place.

## 2021-08-26 NOTE — ED PROVIDER NOTE - NSFOLLOWUPINSTRUCTIONS_ED_ALL_ED_FT
You were seen for abdominal pain.  This is due to something called epiploic appendagitis.    You also have a UTI.  We gave you the first dose of antibiotics here in the ED.  I have prescribed the remainder to your pharmacy.    Follow up with your PCP and urologist in the next 3-4 days and bring a copy of your results.    If you have any concerning symptoms, seek immediate medical attention.      Epiploic Appendagitis       Epiploic appendagitis (EA) is swelling and irritation of pouches (epiploic appendages) that are attached to the end portion of the large intestine (colon). These pouches contain fat and are attached to the outside of the colon. This condition causes sudden pain in the lower abdomen.    EA is rare, and it usually goes away on its own. It can feel like other abdomen (abdominal) conditions, such as appendicitis, a gallbladder attack (cholecystitis), or diverticulitis.      What are the causes?  This condition may be caused by:  •Blocked blood flow due to a blood clot.      •Twisting (torsion) of the epiploic appendages.      •Conditions that cause swelling and inflammation of nearby tissue, such as long-term (chronic) diarrhea, Crohn disease, or ulcerative colitis.        What increases the risk?  You are more likely to develop this condition if:  •You are 40–50 years old.      •You are male.      •You are overweight.        What are the signs or symptoms?    The most common symptom of this condition is lower abdominal pain that starts suddenly and can be severe. Pain can be anywhere in the lower abdomen, but it is more common on the left side. The pain may get worse with movement or when pressing on your abdomen.  The following symptoms are possible, but they are not common:  •Fever.      •Nausea.      •Diarrhea or constipation.        How is this diagnosed?  Your health care provider may suspect EA if you have sudden lower abdominal pain without other symptoms. The condition may be diagnosed based on:  •CT scan. This is the best way to diagnose EA.      •Your symptoms and a physical exam.      •Ultrasound.      •A blood test (complete blood count, CBC).      •A procedure to look inside your abdomen using a lighted scope that has a tiny camera on the end (laparoscopy). This may be done to confirm the diagnosis.        How is this treated?    EA usually goes away without treatment. Your health care provider may recommend NSAIDs (such as aspirin or ibuprofen) to reduce pain and inflammation. In rare cases, if the condition does not improve or it keeps coming back, you may need surgery to remove the appendages.      Follow these instructions at home:    •Take over-the-counter and prescription medicines only as told by your health care provider.      •Return to your normal activities as told by your health care provider. Ask your health care provider what activities are safe for you.      •Keep all follow-up visits as told by your health care provider. This is important.        Contact a health care provider if:    •You have a fever.      •You develop nausea, vomiting, diarrhea, or constipation.      •Your pain gets worse.      •Your pain lasts longer than 10 days.        Get help right away if:    •You have severe pain that does not get better after you take medicine.        Summary    •Epiploic appendagitis (EA) is swelling and irritation of pouches (epiploic appendages) that are attached to the outside of the colon. The colon is the end portion of the large intestine.      •EA can feel like other abdominal conditions, such as appendicitis, a gallbladder attack (cholecystitis), or diverticulitis.      •EA usually goes away without treatment. Your health care provider may recommend NSAIDs (such as aspirin or ibuprofen) to reduce pain and inflammation.      This information is not intended to replace advice given to you by your health care provider. Make sure you discuss any questions you have with your health care provider.

## 2021-08-26 NOTE — ED ADULT NURSE NOTE - OBJECTIVE STATEMENT
70y male presents to the ED via triage complaining of abdominal pain. AOx4 endorses LLQ pain starting on 8/22/21; last night pain was so bad that he could not sleep. States he had x2 days of explosive BM, and last night BMs were formed but small. Upon assessment in ED patients' abdomen is soft, non distended, non tender. Denies chest pain, fever, chills, N/V/D, hematuria, dysuria.

## 2021-08-28 NOTE — ED POST DISCHARGE NOTE - DETAILS
8/30/21: Ucx final >100k enterococcus, not tested against cephalosporins. LVM x1, would call back to reassess - Oracio Nolan PA-C 8/31 - Rosa Rice PA-C 9/1: Spoke with patient, informed of results and plan to switch abx. Advised to stop current abx and take cipro to completion. Patient understands. - Rosa Rice PA-C

## 2021-08-29 LAB
-  AMPICILLIN: SIGNIFICANT CHANGE UP
-  CIPROFLOXACIN: SIGNIFICANT CHANGE UP
-  LEVOFLOXACIN: SIGNIFICANT CHANGE UP
-  NITROFURANTOIN: SIGNIFICANT CHANGE UP
-  TETRACYCLINE: SIGNIFICANT CHANGE UP
-  VANCOMYCIN: SIGNIFICANT CHANGE UP
CULTURE RESULTS: SIGNIFICANT CHANGE UP
CULTURE RESULTS: SIGNIFICANT CHANGE UP
METHOD TYPE: SIGNIFICANT CHANGE UP
ORGANISM # SPEC MICROSCOPIC CNT: SIGNIFICANT CHANGE UP
ORGANISM # SPEC MICROSCOPIC CNT: SIGNIFICANT CHANGE UP
SPECIMEN SOURCE: SIGNIFICANT CHANGE UP

## 2021-09-01 ENCOUNTER — APPOINTMENT (OUTPATIENT)
Dept: RADIOLOGY | Facility: HOSPITAL | Age: 71
End: 2021-09-01

## 2021-09-01 RX ORDER — CIPROFLOXACIN LACTATE 400MG/40ML
1 VIAL (ML) INTRAVENOUS
Qty: 10 | Refills: 0
Start: 2021-09-01 | End: 2021-09-05

## 2021-09-02 ENCOUNTER — APPOINTMENT (OUTPATIENT)
Dept: SPINE | Facility: CLINIC | Age: 71
End: 2021-09-02
Payer: COMMERCIAL

## 2021-09-02 VITALS
SYSTOLIC BLOOD PRESSURE: 124 MMHG | HEART RATE: 84 BPM | WEIGHT: 240 LBS | HEIGHT: 68 IN | OXYGEN SATURATION: 93 % | DIASTOLIC BLOOD PRESSURE: 82 MMHG | BODY MASS INDEX: 36.37 KG/M2

## 2021-09-02 PROCEDURE — 99214 OFFICE O/P EST MOD 30 MIN: CPT

## 2021-09-02 NOTE — CONSULT LETTER
[Dear  ___] : Dear  [unfilled], [Courtesy Letter:] : I had the pleasure of seeing your patient, [unfilled], in my office today. [Please see my note below.] : Please see my note below. [Consult Closing:] : Thank you very much for allowing me to participate in the care of this patient.  If you have any questions, please do not hesitate to contact me. [Sincerely,] : Sincerely, [FreeTextEntry2] : Jaswinder Samuels M.D. [FreeTextEntry1] : Aung Lee\par  1950 [FreeTextEntry3] : Jens Colon M.D.

## 2021-09-02 NOTE — DATA REVIEWED
[de-identified] : Cervical w/o contrast done at [facility] on 08/16/2021. [de-identified] : Cervical w/o contrast done at [facility] on 08/07/2021. [de-identified] : Results of Modified Barium Swallow done on 08/18/2021.

## 2021-09-02 NOTE — PHYSICAL EXAM
[General Appearance - Alert] : alert [General Appearance - Well Nourished] : well nourished [General Appearance - Well Developed] : well developed [General Appearance - Well-Appearing] : healthy appearing [] : normal voice and communication [Abnormal Walk] : normal gait [Kelly] : Kelly's sign was demonstrated [FreeTextEntry6] : mild  weakness left greater than right 4/5

## 2021-09-02 NOTE — REASON FOR VISIT
[Follow-Up: _____] : a [unfilled] follow-up visit [Spouse] : spouse [FreeTextEntry1] : KATJA MARINELLI is a 70 year old gentleman who is experiencing neck pain with radiation down the left arm.  He had an MRI which revealed a developmentally narrow canal with disc osteophyte complexes at C3-C7.  He is here for review of a CT scan of the cervical spine and the results of a modified barium swallow.There was minimal abnormality with his barium swallow. CAT scan of the cervical spine does not show any OPLL.\par

## 2021-09-02 NOTE — ASSESSMENT
[FreeTextEntry1] : cervical spinal stenosis with myelopathy. I discussed options with him specifically a 4 level ACDF versus a posterior multilevel laminectomy and fusion. He strongly prefers anterior surgery.  He is aware of the risks of dysphagia. A complete explanation of all relative risks, benefits and alternatives has been given to him. Arrangements will be made.

## 2021-09-16 ENCOUNTER — OUTPATIENT (OUTPATIENT)
Dept: OUTPATIENT SERVICES | Facility: HOSPITAL | Age: 71
LOS: 1 days | End: 2021-09-16
Payer: COMMERCIAL

## 2021-09-16 VITALS
SYSTOLIC BLOOD PRESSURE: 126 MMHG | TEMPERATURE: 99 F | OXYGEN SATURATION: 96 % | HEART RATE: 85 BPM | WEIGHT: 238.1 LBS | DIASTOLIC BLOOD PRESSURE: 85 MMHG | HEIGHT: 68 IN | RESPIRATION RATE: 16 BRPM

## 2021-09-16 DIAGNOSIS — Z90.49 ACQUIRED ABSENCE OF OTHER SPECIFIED PARTS OF DIGESTIVE TRACT: Chronic | ICD-10-CM

## 2021-09-16 DIAGNOSIS — M54.12 RADICULOPATHY, CERVICAL REGION: ICD-10-CM

## 2021-09-16 DIAGNOSIS — Z29.9 ENCOUNTER FOR PROPHYLACTIC MEASURES, UNSPECIFIED: ICD-10-CM

## 2021-09-16 LAB
ANION GAP SERPL CALC-SCNC: 15 MMOL/L — SIGNIFICANT CHANGE UP (ref 5–17)
BLD GP AB SCN SERPL QL: NEGATIVE — SIGNIFICANT CHANGE UP
BUN SERPL-MCNC: 16 MG/DL — SIGNIFICANT CHANGE UP (ref 7–23)
CALCIUM SERPL-MCNC: 9.7 MG/DL — SIGNIFICANT CHANGE UP (ref 8.4–10.5)
CHLORIDE SERPL-SCNC: 103 MMOL/L — SIGNIFICANT CHANGE UP (ref 96–108)
CO2 SERPL-SCNC: 24 MMOL/L — SIGNIFICANT CHANGE UP (ref 22–31)
CREAT SERPL-MCNC: 1.35 MG/DL — HIGH (ref 0.5–1.3)
GLUCOSE SERPL-MCNC: 105 MG/DL — HIGH (ref 70–99)
HCT VFR BLD CALC: 47.8 % — SIGNIFICANT CHANGE UP (ref 39–50)
HGB BLD-MCNC: 15.4 G/DL — SIGNIFICANT CHANGE UP (ref 13–17)
MCHC RBC-ENTMCNC: 27.7 PG — SIGNIFICANT CHANGE UP (ref 27–34)
MCHC RBC-ENTMCNC: 32.2 GM/DL — SIGNIFICANT CHANGE UP (ref 32–36)
MCV RBC AUTO: 86 FL — SIGNIFICANT CHANGE UP (ref 80–100)
MRSA PCR RESULT.: SIGNIFICANT CHANGE UP
NRBC # BLD: 0 /100 WBCS — SIGNIFICANT CHANGE UP (ref 0–0)
PLATELET # BLD AUTO: 229 K/UL — SIGNIFICANT CHANGE UP (ref 150–400)
POTASSIUM SERPL-MCNC: 4 MMOL/L — SIGNIFICANT CHANGE UP (ref 3.5–5.3)
POTASSIUM SERPL-SCNC: 4 MMOL/L — SIGNIFICANT CHANGE UP (ref 3.5–5.3)
RBC # BLD: 5.56 M/UL — SIGNIFICANT CHANGE UP (ref 4.2–5.8)
RBC # FLD: 13.4 % — SIGNIFICANT CHANGE UP (ref 10.3–14.5)
RH IG SCN BLD-IMP: POSITIVE — SIGNIFICANT CHANGE UP
S AUREUS DNA NOSE QL NAA+PROBE: SIGNIFICANT CHANGE UP
SODIUM SERPL-SCNC: 142 MMOL/L — SIGNIFICANT CHANGE UP (ref 135–145)
WBC # BLD: 9.98 K/UL — SIGNIFICANT CHANGE UP (ref 3.8–10.5)
WBC # FLD AUTO: 9.98 K/UL — SIGNIFICANT CHANGE UP (ref 3.8–10.5)

## 2021-09-16 PROCEDURE — G0463: CPT

## 2021-09-16 PROCEDURE — 86900 BLOOD TYPING SEROLOGIC ABO: CPT

## 2021-09-16 PROCEDURE — 87641 MR-STAPH DNA AMP PROBE: CPT

## 2021-09-16 PROCEDURE — 86850 RBC ANTIBODY SCREEN: CPT

## 2021-09-16 PROCEDURE — 80048 BASIC METABOLIC PNL TOTAL CA: CPT

## 2021-09-16 PROCEDURE — 87640 STAPH A DNA AMP PROBE: CPT

## 2021-09-16 PROCEDURE — 85027 COMPLETE CBC AUTOMATED: CPT

## 2021-09-16 PROCEDURE — 86901 BLOOD TYPING SEROLOGIC RH(D): CPT

## 2021-09-16 RX ORDER — CEFAZOLIN SODIUM 1 G
2000 VIAL (EA) INJECTION ONCE
Refills: 0 | Status: DISCONTINUED | OUTPATIENT
Start: 2021-09-29 | End: 2021-09-30

## 2021-09-16 NOTE — H&P PST ADULT - ASSESSMENT
DISHAI VTE 2.0 SCORE [CLOT updated 2019]    AGE RELATED RISK FACTORS                                                       MOBILITY RELATED FACTORS  [ ] Age 41-60 years                                            (1 Point)                    [ ] Bed rest                                                        (1 Point)  [x ] Age: 61-74 years                                           (2 Points)                  [ ] Plaster cast                                                   (2 Points)  [ ] Age= 75 years                                              (3 Points)                    [ ] Bed bound for more than 72 hours                 (2 Points)    DISEASE RELATED RISK FACTORS                                               GENDER SPECIFIC FACTORS  [ ] Edema in the lower extremities                       (1 Point)              [ ] Pregnancy                                                     (1 Point)  [ ] Varicose veins                                               (1 Point)                     [ ] Post-partum < 6 weeks                                   (1 Point)             [x ] BMI > 25 Kg/m2                                            (1 Point)                     [ ] Hormonal therapy  or oral contraception          (1 Point)                 [ ] Sepsis (in the previous month)                        (1 Point)               [ ] History of pregnancy complications                 (1 point)  [ ] Pneumonia or serious lung disease                                               [ ] Unexplained or recurrent                     (1 Point)           (in the previous month)                               (1 Point)  [ ] Abnormal pulmonary function test                     (1 Point)                 SURGERY RELATED RISK FACTORS  [ ] Acute myocardial infarction                              (1 Point)               [ ]  Section                                             (1 Point)  [ ] Congestive heart failure (in the previous month)  (1 Point)      [ ] Minor surgery                                                  (1 Point)   [ ] Inflammatory bowel disease                             (1 Point)               [ ] Arthroscopic surgery                                        (2 Points)  [ ] Central venous access                                      (2 Points)                [x ] General surgery lasting more than 45 minutes (2 points)  [ ] Malignancy- Present or previous                   (2 Points)                [ ] Elective arthroplasty                                         (5 points)    [ ] Stroke (in the previous month)                          (5 Points)                                                                                                                                                           HEMATOLOGY RELATED FACTORS                                                 TRAUMA RELATED RISK FACTORS  [ ] Prior episodes of VTE                                     (3 Points)                [ ] Fracture of the hip, pelvis, or leg                       (5 Points)  [ ] Positive family history for VTE                         (3 Points)             [ ] Acute spinal cord injury (in the previous month)  (5 Points)  [ ] Prothrombin 02732 A                                     (3 Points)               [ ] Paralysis  (less than 1 month)                             (5 Points)  [ ] Factor V Leiden                                             (3 Points)                  [ ] Multiple Trauma within 1 month                        (5 Points)  [ ] Lupus anticoagulants                                     (3 Points)                                                           [ ] Anticardiolipin antibodies                               (3 Points)                                                       [ ] High homocysteine in the blood                      (3 Points)                                             [ ] Other congenital or acquired thrombophilia      (3 Points)                                                [ ] Heparin induced thrombocytopenia                  (3 Points)                                     Total Score [   5       ]

## 2021-09-16 NOTE — H&P PST ADULT - REASON FOR ADMISSION
cervical radiculopathy  Goal: I want to be pain free cervical radiculopathy  Goal: I want to be pain free and play Golf

## 2021-09-16 NOTE — H&P PST ADULT - NSICDXPASTMEDICALHX_GEN_ALL_CORE_FT
PAST MEDICAL HISTORY:  BPH (benign prostatic hyperplasia)     Cervical osteophyte     Cervical radiculopathy     Cervical spinal stenosis      PAST MEDICAL HISTORY:  BPH (benign prostatic hyperplasia) s/p UroLift    Cervical osteophyte     Cervical radiculopathy     Cervical spinal stenosis      PAST MEDICAL HISTORY:  BPH (benign prostatic hyperplasia) s/p UroLift    Cervical osteophyte     Cervical radiculopathy     Cervical spinal stenosis     Enterococcus UTI 8/26/2021 treated with ABx--resolved, symptom free now    Epiploic appendagitis ER visit 8/26/2021

## 2021-09-16 NOTE — H&P PST ADULT - PROBLEM SELECTOR PLAN 1
C3-7 anterior cervical discectomy and fusion  PST labs send  preprocedure surgical scrub instructions discussed   EKG/ cardiac eval 9/21/2021

## 2021-09-16 NOTE — H&P PST ADULT - NEGATIVE GENERAL GENITOURINARY SYMPTOMS
no hematuria/no flank pain L/no flank pain R/no bladder infections no hematuria/no flank pain L/no flank pain R/no bladder infections/no dysuria/normal urinary frequency/no nocturia

## 2021-09-16 NOTE — H&P PST ADULT - HISTORY OF PRESENT ILLNESS
**Covid test  Denies any recent covid infection or exposure    70 year old male ( [employer] employee) with PMH of BPH s/p UroLift, abdominal pain/ UTI 8/26/2021 with ER visit treated with antibiotics ( resolved, currently symptom free) with complaints of left arm pain/ weakness x 1 month w/ cervical radiculopathy planned for C3-7 Anterior cervical discectomy and fusion on 9/29/2021.     **Covid test 9/26/2021 ( will be doing it near home, phone number given)   Denies any recent covid infection or exposure   fully vaccinated    70 year old male ( [employer] employee) with PMH of BPH s/p UroLift, abdominal pain/ UTI 8/26/2021 with ER visit treated with antibiotics ( resolved, currently symptom free) with complaints of neck pain radiating to left arm/ weakness x 1 month w/ cervical radiculopathy planned for C3-7 Anterior cervical discectomy and fusion on 9/29/2021.     **Covid test 9/26/2021 ( will be doing it near home, phone number given)   Denies any recent covid infection or exposure   fully vaccinated

## 2021-09-17 PROBLEM — M54.12 RADICULOPATHY, CERVICAL REGION: Chronic | Status: ACTIVE | Noted: 2021-09-16

## 2021-09-17 PROBLEM — N39.0 URINARY TRACT INFECTION, SITE NOT SPECIFIED: Chronic | Status: ACTIVE | Noted: 2021-09-16

## 2021-09-17 PROBLEM — M25.78 OSTEOPHYTE, VERTEBRAE: Chronic | Status: ACTIVE | Noted: 2021-09-16

## 2021-09-17 PROBLEM — M48.02 SPINAL STENOSIS, CERVICAL REGION: Chronic | Status: ACTIVE | Noted: 2021-09-16

## 2021-09-17 PROBLEM — N40.0 BENIGN PROSTATIC HYPERPLASIA WITHOUT LOWER URINARY TRACT SYMPTOMS: Chronic | Status: ACTIVE | Noted: 2021-09-16

## 2021-09-17 PROBLEM — K63.89 OTHER SPECIFIED DISEASES OF INTESTINE: Chronic | Status: ACTIVE | Noted: 2021-09-16

## 2021-09-24 ENCOUNTER — APPOINTMENT (OUTPATIENT)
Dept: CV DIAGNOSTICS | Facility: HOSPITAL | Age: 71
End: 2021-09-24

## 2021-09-24 ENCOUNTER — OUTPATIENT (OUTPATIENT)
Dept: OUTPATIENT SERVICES | Facility: HOSPITAL | Age: 71
LOS: 1 days | End: 2021-09-24
Payer: COMMERCIAL

## 2021-09-24 DIAGNOSIS — I25.10 ATHEROSCLEROTIC HEART DISEASE OF NATIVE CORONARY ARTERY WITHOUT ANGINA PECTORIS: ICD-10-CM

## 2021-09-24 DIAGNOSIS — Z90.49 ACQUIRED ABSENCE OF OTHER SPECIFIED PARTS OF DIGESTIVE TRACT: Chronic | ICD-10-CM

## 2021-09-24 PROCEDURE — 93017 CV STRESS TEST TRACING ONLY: CPT

## 2021-09-24 PROCEDURE — 93016 CV STRESS TEST SUPVJ ONLY: CPT

## 2021-09-24 PROCEDURE — A9500: CPT

## 2021-09-24 PROCEDURE — 93018 CV STRESS TEST I&R ONLY: CPT

## 2021-09-24 PROCEDURE — 78452 HT MUSCLE IMAGE SPECT MULT: CPT | Mod: MH

## 2021-09-24 PROCEDURE — 78452 HT MUSCLE IMAGE SPECT MULT: CPT | Mod: 26,MH

## 2021-09-26 ENCOUNTER — APPOINTMENT (OUTPATIENT)
Dept: DISASTER EMERGENCY | Facility: CLINIC | Age: 71
End: 2021-09-26

## 2021-09-27 LAB — SARS-COV-2 N GENE NPH QL NAA+PROBE: NOT DETECTED

## 2021-09-28 ENCOUNTER — TRANSCRIPTION ENCOUNTER (OUTPATIENT)
Age: 71
End: 2021-09-28

## 2021-09-29 ENCOUNTER — INPATIENT (INPATIENT)
Facility: HOSPITAL | Age: 71
LOS: 0 days | Discharge: HOME | DRG: 472 | End: 2021-09-30
Attending: NEUROLOGICAL SURGERY | Admitting: NEUROLOGICAL SURGERY
Payer: COMMERCIAL

## 2021-09-29 ENCOUNTER — APPOINTMENT (OUTPATIENT)
Dept: SPINE | Facility: HOSPITAL | Age: 71
End: 2021-09-29

## 2021-09-29 VITALS
DIASTOLIC BLOOD PRESSURE: 85 MMHG | OXYGEN SATURATION: 96 % | WEIGHT: 238.1 LBS | RESPIRATION RATE: 18 BRPM | SYSTOLIC BLOOD PRESSURE: 143 MMHG | HEIGHT: 68 IN | TEMPERATURE: 98 F | HEART RATE: 78 BPM

## 2021-09-29 DIAGNOSIS — M54.12 RADICULOPATHY, CERVICAL REGION: ICD-10-CM

## 2021-09-29 DIAGNOSIS — Z90.49 ACQUIRED ABSENCE OF OTHER SPECIFIED PARTS OF DIGESTIVE TRACT: Chronic | ICD-10-CM

## 2021-09-29 LAB
ANION GAP SERPL CALC-SCNC: 16 MMOL/L — SIGNIFICANT CHANGE UP (ref 5–17)
APPEARANCE UR: CLEAR — SIGNIFICANT CHANGE UP
BACTERIA # UR AUTO: NEGATIVE — SIGNIFICANT CHANGE UP
BASOPHILS # BLD AUTO: 0.04 K/UL — SIGNIFICANT CHANGE UP (ref 0–0.2)
BASOPHILS NFR BLD AUTO: 0.4 % — SIGNIFICANT CHANGE UP (ref 0–2)
BILIRUB UR-MCNC: NEGATIVE — SIGNIFICANT CHANGE UP
BUN SERPL-MCNC: 14 MG/DL — SIGNIFICANT CHANGE UP (ref 7–23)
CALCIUM SERPL-MCNC: 8.7 MG/DL — SIGNIFICANT CHANGE UP (ref 8.4–10.5)
CHLORIDE SERPL-SCNC: 106 MMOL/L — SIGNIFICANT CHANGE UP (ref 96–108)
CO2 SERPL-SCNC: 18 MMOL/L — LOW (ref 22–31)
COLOR SPEC: YELLOW — SIGNIFICANT CHANGE UP
CREAT SERPL-MCNC: 1.16 MG/DL — SIGNIFICANT CHANGE UP (ref 0.5–1.3)
DIFF PNL FLD: ABNORMAL
EOSINOPHIL # BLD AUTO: 0.03 K/UL — SIGNIFICANT CHANGE UP (ref 0–0.5)
EOSINOPHIL NFR BLD AUTO: 0.3 % — SIGNIFICANT CHANGE UP (ref 0–6)
EPI CELLS # UR: 1 /HPF — SIGNIFICANT CHANGE UP
GLUCOSE BLDC GLUCOMTR-MCNC: 141 MG/DL — HIGH (ref 70–99)
GLUCOSE BLDC GLUCOMTR-MCNC: 168 MG/DL — HIGH (ref 70–99)
GLUCOSE SERPL-MCNC: 164 MG/DL — HIGH (ref 70–99)
GLUCOSE UR QL: NEGATIVE — SIGNIFICANT CHANGE UP
HCT VFR BLD CALC: 42 % — SIGNIFICANT CHANGE UP (ref 39–50)
HGB BLD-MCNC: 13.5 G/DL — SIGNIFICANT CHANGE UP (ref 13–17)
HYALINE CASTS # UR AUTO: 1 /LPF — SIGNIFICANT CHANGE UP (ref 0–2)
IMM GRANULOCYTES NFR BLD AUTO: 0.4 % — SIGNIFICANT CHANGE UP (ref 0–1.5)
KETONES UR-MCNC: NEGATIVE — SIGNIFICANT CHANGE UP
LEUKOCYTE ESTERASE UR-ACNC: NEGATIVE — SIGNIFICANT CHANGE UP
LYMPHOCYTES # BLD AUTO: 1.08 K/UL — SIGNIFICANT CHANGE UP (ref 1–3.3)
LYMPHOCYTES # BLD AUTO: 10.4 % — LOW (ref 13–44)
MCHC RBC-ENTMCNC: 27.4 PG — SIGNIFICANT CHANGE UP (ref 27–34)
MCHC RBC-ENTMCNC: 32.1 GM/DL — SIGNIFICANT CHANGE UP (ref 32–36)
MCV RBC AUTO: 85.2 FL — SIGNIFICANT CHANGE UP (ref 80–100)
MONOCYTES # BLD AUTO: 0.23 K/UL — SIGNIFICANT CHANGE UP (ref 0–0.9)
MONOCYTES NFR BLD AUTO: 2.2 % — SIGNIFICANT CHANGE UP (ref 2–14)
NEUTROPHILS # BLD AUTO: 8.99 K/UL — HIGH (ref 1.8–7.4)
NEUTROPHILS NFR BLD AUTO: 86.3 % — HIGH (ref 43–77)
NITRITE UR-MCNC: NEGATIVE — SIGNIFICANT CHANGE UP
NRBC # BLD: 0 /100 WBCS — SIGNIFICANT CHANGE UP (ref 0–0)
PH UR: 6 — SIGNIFICANT CHANGE UP (ref 5–8)
PLATELET # BLD AUTO: 160 K/UL — SIGNIFICANT CHANGE UP (ref 150–400)
POTASSIUM SERPL-MCNC: 4 MMOL/L — SIGNIFICANT CHANGE UP (ref 3.5–5.3)
POTASSIUM SERPL-SCNC: 4 MMOL/L — SIGNIFICANT CHANGE UP (ref 3.5–5.3)
PROT UR-MCNC: ABNORMAL
RBC # BLD: 4.93 M/UL — SIGNIFICANT CHANGE UP (ref 4.2–5.8)
RBC # FLD: 13.4 % — SIGNIFICANT CHANGE UP (ref 10.3–14.5)
RBC CASTS # UR COMP ASSIST: 10 /HPF — HIGH (ref 0–4)
SODIUM SERPL-SCNC: 140 MMOL/L — SIGNIFICANT CHANGE UP (ref 135–145)
SP GR SPEC: 1.03 — HIGH (ref 1.01–1.02)
UROBILINOGEN FLD QL: NEGATIVE — SIGNIFICANT CHANGE UP
WBC # BLD: 10.41 K/UL — SIGNIFICANT CHANGE UP (ref 3.8–10.5)
WBC # FLD AUTO: 10.41 K/UL — SIGNIFICANT CHANGE UP (ref 3.8–10.5)
WBC UR QL: 4 /HPF — SIGNIFICANT CHANGE UP (ref 0–5)

## 2021-09-29 PROCEDURE — 51703 INSERT BLADDER CATH COMPLEX: CPT

## 2021-09-29 PROCEDURE — 22853 INSJ BIOMECHANICAL DEVICE: CPT | Mod: GC

## 2021-09-29 PROCEDURE — 22552 ARTHRD ANT NTRBD CERVICAL EA: CPT | Mod: GC

## 2021-09-29 PROCEDURE — 22551 ARTHRD ANT NTRBDY CERVICAL: CPT | Mod: GC

## 2021-09-29 RX ORDER — ACETAMINOPHEN 500 MG
650 TABLET ORAL EVERY 6 HOURS
Refills: 0 | Status: DISCONTINUED | OUTPATIENT
Start: 2021-09-29 | End: 2021-09-30

## 2021-09-29 RX ORDER — CHLORHEXIDINE GLUCONATE 213 G/1000ML
1 SOLUTION TOPICAL ONCE
Refills: 0 | Status: DISCONTINUED | OUTPATIENT
Start: 2021-09-29 | End: 2021-09-29

## 2021-09-29 RX ORDER — FENTANYL CITRATE 50 UG/ML
50 INJECTION INTRAVENOUS
Refills: 0 | Status: DISCONTINUED | OUTPATIENT
Start: 2021-09-29 | End: 2021-09-29

## 2021-09-29 RX ORDER — INSULIN LISPRO 100/ML
VIAL (ML) SUBCUTANEOUS AT BEDTIME
Refills: 0 | Status: DISCONTINUED | OUTPATIENT
Start: 2021-09-29 | End: 2021-09-29

## 2021-09-29 RX ORDER — LABETALOL HCL 100 MG
5 TABLET ORAL ONCE
Refills: 0 | Status: COMPLETED | OUTPATIENT
Start: 2021-09-29 | End: 2021-09-29

## 2021-09-29 RX ORDER — DEXTROSE 50 % IN WATER 50 %
15 SYRINGE (ML) INTRAVENOUS ONCE
Refills: 0 | Status: DISCONTINUED | OUTPATIENT
Start: 2021-09-29 | End: 2021-09-30

## 2021-09-29 RX ORDER — DEXAMETHASONE 0.5 MG/5ML
4 ELIXIR ORAL EVERY 6 HOURS
Refills: 0 | Status: COMPLETED | OUTPATIENT
Start: 2021-09-29 | End: 2021-09-30

## 2021-09-29 RX ORDER — OXYCODONE HYDROCHLORIDE 5 MG/1
5 TABLET ORAL EVERY 8 HOURS
Refills: 0 | Status: DISCONTINUED | OUTPATIENT
Start: 2021-09-29 | End: 2021-09-30

## 2021-09-29 RX ORDER — ONDANSETRON 8 MG/1
4 TABLET, FILM COATED ORAL ONCE
Refills: 0 | Status: DISCONTINUED | OUTPATIENT
Start: 2021-09-29 | End: 2021-09-29

## 2021-09-29 RX ORDER — INFLUENZA VIRUS VACCINE 15; 15; 15; 15 UG/.5ML; UG/.5ML; UG/.5ML; UG/.5ML
0.5 SUSPENSION INTRAMUSCULAR ONCE
Refills: 0 | Status: DISCONTINUED | OUTPATIENT
Start: 2021-09-29 | End: 2021-09-30

## 2021-09-29 RX ORDER — DEXTROSE 50 % IN WATER 50 %
12.5 SYRINGE (ML) INTRAVENOUS ONCE
Refills: 0 | Status: DISCONTINUED | OUTPATIENT
Start: 2021-09-29 | End: 2021-09-30

## 2021-09-29 RX ORDER — DEXTROSE 50 % IN WATER 50 %
25 SYRINGE (ML) INTRAVENOUS ONCE
Refills: 0 | Status: DISCONTINUED | OUTPATIENT
Start: 2021-09-29 | End: 2021-09-29

## 2021-09-29 RX ORDER — DEXTROSE 50 % IN WATER 50 %
12.5 SYRINGE (ML) INTRAVENOUS ONCE
Refills: 0 | Status: DISCONTINUED | OUTPATIENT
Start: 2021-09-29 | End: 2021-09-29

## 2021-09-29 RX ORDER — GABAPENTIN 400 MG/1
300 CAPSULE ORAL THREE TIMES A DAY
Refills: 0 | Status: DISCONTINUED | OUTPATIENT
Start: 2021-09-29 | End: 2021-09-30

## 2021-09-29 RX ORDER — OXYCODONE HYDROCHLORIDE 5 MG/1
10 TABLET ORAL EVERY 4 HOURS
Refills: 0 | Status: DISCONTINUED | OUTPATIENT
Start: 2021-09-29 | End: 2021-09-30

## 2021-09-29 RX ORDER — DEXTROSE 50 % IN WATER 50 %
15 SYRINGE (ML) INTRAVENOUS ONCE
Refills: 0 | Status: DISCONTINUED | OUTPATIENT
Start: 2021-09-29 | End: 2021-09-29

## 2021-09-29 RX ORDER — HYDROMORPHONE HYDROCHLORIDE 2 MG/ML
0.5 INJECTION INTRAMUSCULAR; INTRAVENOUS; SUBCUTANEOUS EVERY 4 HOURS
Refills: 0 | Status: DISCONTINUED | OUTPATIENT
Start: 2021-09-29 | End: 2021-09-30

## 2021-09-29 RX ORDER — SODIUM CHLORIDE 9 MG/ML
1000 INJECTION, SOLUTION INTRAVENOUS
Refills: 0 | Status: DISCONTINUED | OUTPATIENT
Start: 2021-09-29 | End: 2021-09-30

## 2021-09-29 RX ORDER — ENOXAPARIN SODIUM 100 MG/ML
40 INJECTION SUBCUTANEOUS AT BEDTIME
Refills: 0 | Status: DISCONTINUED | OUTPATIENT
Start: 2021-09-30 | End: 2021-09-30

## 2021-09-29 RX ORDER — SODIUM CHLORIDE 9 MG/ML
1000 INJECTION INTRAMUSCULAR; INTRAVENOUS; SUBCUTANEOUS
Refills: 0 | Status: DISCONTINUED | OUTPATIENT
Start: 2021-09-29 | End: 2021-09-30

## 2021-09-29 RX ORDER — GLUCAGON INJECTION, SOLUTION 0.5 MG/.1ML
1 INJECTION, SOLUTION SUBCUTANEOUS ONCE
Refills: 0 | Status: DISCONTINUED | OUTPATIENT
Start: 2021-09-29 | End: 2021-09-30

## 2021-09-29 RX ORDER — DEXTROSE 50 % IN WATER 50 %
25 SYRINGE (ML) INTRAVENOUS ONCE
Refills: 0 | Status: DISCONTINUED | OUTPATIENT
Start: 2021-09-29 | End: 2021-09-30

## 2021-09-29 RX ORDER — INSULIN LISPRO 100/ML
VIAL (ML) SUBCUTANEOUS AT BEDTIME
Refills: 0 | Status: DISCONTINUED | OUTPATIENT
Start: 2021-09-29 | End: 2021-09-30

## 2021-09-29 RX ORDER — HYDROMORPHONE HYDROCHLORIDE 2 MG/ML
0.25 INJECTION INTRAMUSCULAR; INTRAVENOUS; SUBCUTANEOUS
Refills: 0 | Status: DISCONTINUED | OUTPATIENT
Start: 2021-09-29 | End: 2021-09-29

## 2021-09-29 RX ORDER — CEFAZOLIN SODIUM 1 G
2000 VIAL (EA) INJECTION EVERY 8 HOURS
Refills: 0 | Status: COMPLETED | OUTPATIENT
Start: 2021-09-29 | End: 2021-09-30

## 2021-09-29 RX ORDER — SODIUM CHLORIDE 9 MG/ML
3 INJECTION INTRAMUSCULAR; INTRAVENOUS; SUBCUTANEOUS EVERY 8 HOURS
Refills: 0 | Status: DISCONTINUED | OUTPATIENT
Start: 2021-09-29 | End: 2021-09-29

## 2021-09-29 RX ORDER — POLYETHYLENE GLYCOL 3350 17 G/17G
17 POWDER, FOR SOLUTION ORAL
Refills: 0 | Status: DISCONTINUED | OUTPATIENT
Start: 2021-09-29 | End: 2021-09-30

## 2021-09-29 RX ORDER — LIDOCAINE HCL 20 MG/ML
0.2 VIAL (ML) INJECTION ONCE
Refills: 0 | Status: DISCONTINUED | OUTPATIENT
Start: 2021-09-29 | End: 2021-09-29

## 2021-09-29 RX ORDER — INSULIN LISPRO 100/ML
VIAL (ML) SUBCUTANEOUS
Refills: 0 | Status: DISCONTINUED | OUTPATIENT
Start: 2021-09-29 | End: 2021-09-30

## 2021-09-29 RX ORDER — SENNA PLUS 8.6 MG/1
2 TABLET ORAL AT BEDTIME
Refills: 0 | Status: DISCONTINUED | OUTPATIENT
Start: 2021-09-29 | End: 2021-09-30

## 2021-09-29 RX ADMIN — HYDROMORPHONE HYDROCHLORIDE 0.25 MILLIGRAM(S): 2 INJECTION INTRAMUSCULAR; INTRAVENOUS; SUBCUTANEOUS at 14:15

## 2021-09-29 RX ADMIN — HYDROMORPHONE HYDROCHLORIDE 0.25 MILLIGRAM(S): 2 INJECTION INTRAMUSCULAR; INTRAVENOUS; SUBCUTANEOUS at 14:07

## 2021-09-29 RX ADMIN — OXYCODONE HYDROCHLORIDE 10 MILLIGRAM(S): 5 TABLET ORAL at 18:30

## 2021-09-29 RX ADMIN — OXYCODONE HYDROCHLORIDE 10 MILLIGRAM(S): 5 TABLET ORAL at 23:20

## 2021-09-29 RX ADMIN — HYDROMORPHONE HYDROCHLORIDE 0.25 MILLIGRAM(S): 2 INJECTION INTRAMUSCULAR; INTRAVENOUS; SUBCUTANEOUS at 14:05

## 2021-09-29 RX ADMIN — HYDROMORPHONE HYDROCHLORIDE 0.25 MILLIGRAM(S): 2 INJECTION INTRAMUSCULAR; INTRAVENOUS; SUBCUTANEOUS at 13:52

## 2021-09-29 RX ADMIN — OXYCODONE HYDROCHLORIDE 10 MILLIGRAM(S): 5 TABLET ORAL at 19:00

## 2021-09-29 RX ADMIN — Medication 4 MILLIGRAM(S): at 23:57

## 2021-09-29 RX ADMIN — Medication 100 MILLIGRAM(S): at 17:00

## 2021-09-29 RX ADMIN — Medication 5 MILLIGRAM(S): at 13:52

## 2021-09-29 RX ADMIN — OXYCODONE HYDROCHLORIDE 10 MILLIGRAM(S): 5 TABLET ORAL at 22:48

## 2021-09-29 RX ADMIN — Medication 4 MILLIGRAM(S): at 17:01

## 2021-09-29 RX ADMIN — GABAPENTIN 300 MILLIGRAM(S): 400 CAPSULE ORAL at 21:41

## 2021-09-29 NOTE — PHYSICAL THERAPY INITIAL EVALUATION ADULT - PLANNED THERAPY INTERVENTIONS, PT EVAL
Stair training: GOAL: Pt will negotiate 12 steps using 1 handrail independently in 2 weeks./bed mobility training/gait training/transfer training

## 2021-09-29 NOTE — BRIEF OPERATIVE NOTE - NSICDXBRIEFPROCEDURE_GEN_ALL_CORE_FT
PROCEDURES:  Discectomy of cervical spine by anterior approach 29-Sep-2021 12:38:42  Juan Alberto Velasquez

## 2021-09-29 NOTE — PROCEDURE NOTE - ADDITIONAL PROCEDURE DETAILS
male pt with known urethral stricture, bph s/p urolift for sx today requiring esteban. failed attempts by primary team prev.  using aseptic technique, 12f silicon placed with yellow urine return  tov per primary team

## 2021-09-29 NOTE — PHYSICAL THERAPY INITIAL EVALUATION ADULT - PERTINENT HX OF CURRENT PROBLEM, REHAB EVAL
70 year old male ( [employer] employee) with PMH of BPH s/p UroLift, abdominal pain/ UTI 8/26/2021 with ER visit treated with antibiotics ( resolved, currently symptom free) with complaints of neck pain radiating to left arm/ weakness x 1 month w/ cervical radiculopathy planned for C3-7 Anterior cervical discectomy and fusion on 9/29/2021

## 2021-09-29 NOTE — PHYSICAL THERAPY INITIAL EVALUATION ADULT - ADDITIONAL COMMENTS
PTA pt was ambulating independently and performing all functional activities independently. Pt does not own or use any DME.

## 2021-09-29 NOTE — PHYSICAL THERAPY INITIAL EVALUATION ADULT - LIVES WITH, PROFILE
Pt lives with wife in a house with 4 steps to enter +railing, and 12 steps inside to 2nd floor bedroom/bathroom./spouse

## 2021-09-30 ENCOUNTER — TRANSCRIPTION ENCOUNTER (OUTPATIENT)
Age: 71
End: 2021-09-30

## 2021-09-30 VITALS — HEART RATE: 86 BPM | SYSTOLIC BLOOD PRESSURE: 165 MMHG | OXYGEN SATURATION: 97 % | DIASTOLIC BLOOD PRESSURE: 78 MMHG

## 2021-09-30 LAB
A1C WITH ESTIMATED AVERAGE GLUCOSE RESULT: 6 % — HIGH (ref 4–5.6)
ANION GAP SERPL CALC-SCNC: 13 MMOL/L — SIGNIFICANT CHANGE UP (ref 5–17)
BASOPHILS # BLD AUTO: 0.02 K/UL — SIGNIFICANT CHANGE UP (ref 0–0.2)
BASOPHILS NFR BLD AUTO: 0.1 % — SIGNIFICANT CHANGE UP (ref 0–2)
BUN SERPL-MCNC: 12 MG/DL — SIGNIFICANT CHANGE UP (ref 7–23)
CALCIUM SERPL-MCNC: 8.7 MG/DL — SIGNIFICANT CHANGE UP (ref 8.4–10.5)
CHLORIDE SERPL-SCNC: 104 MMOL/L — SIGNIFICANT CHANGE UP (ref 96–108)
CO2 SERPL-SCNC: 22 MMOL/L — SIGNIFICANT CHANGE UP (ref 22–31)
CREAT SERPL-MCNC: 1.05 MG/DL — SIGNIFICANT CHANGE UP (ref 0.5–1.3)
EOSINOPHIL # BLD AUTO: 0 K/UL — SIGNIFICANT CHANGE UP (ref 0–0.5)
EOSINOPHIL NFR BLD AUTO: 0 % — SIGNIFICANT CHANGE UP (ref 0–6)
ESTIMATED AVERAGE GLUCOSE: 126 MG/DL — HIGH (ref 68–114)
GLUCOSE BLDC GLUCOMTR-MCNC: 138 MG/DL — HIGH (ref 70–99)
GLUCOSE SERPL-MCNC: 141 MG/DL — HIGH (ref 70–99)
HCT VFR BLD CALC: 39.8 % — SIGNIFICANT CHANGE UP (ref 39–50)
HCT VFR BLD CALC: 40.2 % — SIGNIFICANT CHANGE UP (ref 39–50)
HGB BLD-MCNC: 12.8 G/DL — LOW (ref 13–17)
HGB BLD-MCNC: 12.8 G/DL — LOW (ref 13–17)
IMM GRANULOCYTES NFR BLD AUTO: 0.3 % — SIGNIFICANT CHANGE UP (ref 0–1.5)
LYMPHOCYTES # BLD AUTO: 1.43 K/UL — SIGNIFICANT CHANGE UP (ref 1–3.3)
LYMPHOCYTES # BLD AUTO: 10.6 % — LOW (ref 13–44)
MCHC RBC-ENTMCNC: 27.5 PG — SIGNIFICANT CHANGE UP (ref 27–34)
MCHC RBC-ENTMCNC: 27.6 PG — SIGNIFICANT CHANGE UP (ref 27–34)
MCHC RBC-ENTMCNC: 31.8 GM/DL — LOW (ref 32–36)
MCHC RBC-ENTMCNC: 32.2 GM/DL — SIGNIFICANT CHANGE UP (ref 32–36)
MCV RBC AUTO: 86 FL — SIGNIFICANT CHANGE UP (ref 80–100)
MCV RBC AUTO: 86.3 FL — SIGNIFICANT CHANGE UP (ref 80–100)
MONOCYTES # BLD AUTO: 0.84 K/UL — SIGNIFICANT CHANGE UP (ref 0–0.9)
MONOCYTES NFR BLD AUTO: 6.2 % — SIGNIFICANT CHANGE UP (ref 2–14)
NEUTROPHILS # BLD AUTO: 11.16 K/UL — HIGH (ref 1.8–7.4)
NEUTROPHILS NFR BLD AUTO: 82.8 % — HIGH (ref 43–77)
NRBC # BLD: 0 /100 WBCS — SIGNIFICANT CHANGE UP (ref 0–0)
NRBC # BLD: 0 /100 WBCS — SIGNIFICANT CHANGE UP (ref 0–0)
PLATELET # BLD AUTO: 164 K/UL — SIGNIFICANT CHANGE UP (ref 150–400)
PLATELET # BLD AUTO: 171 K/UL — SIGNIFICANT CHANGE UP (ref 150–400)
POTASSIUM SERPL-MCNC: 4.6 MMOL/L — SIGNIFICANT CHANGE UP (ref 3.5–5.3)
POTASSIUM SERPL-SCNC: 4.6 MMOL/L — SIGNIFICANT CHANGE UP (ref 3.5–5.3)
RBC # BLD: 4.63 M/UL — SIGNIFICANT CHANGE UP (ref 4.2–5.8)
RBC # BLD: 4.66 M/UL — SIGNIFICANT CHANGE UP (ref 4.2–5.8)
RBC # FLD: 13.6 % — SIGNIFICANT CHANGE UP (ref 10.3–14.5)
RBC # FLD: 13.7 % — SIGNIFICANT CHANGE UP (ref 10.3–14.5)
SODIUM SERPL-SCNC: 139 MMOL/L — SIGNIFICANT CHANGE UP (ref 135–145)
WBC # BLD: 13.44 K/UL — HIGH (ref 3.8–10.5)
WBC # BLD: 13.49 K/UL — HIGH (ref 3.8–10.5)
WBC # FLD AUTO: 13.44 K/UL — HIGH (ref 3.8–10.5)
WBC # FLD AUTO: 13.49 K/UL — HIGH (ref 3.8–10.5)

## 2021-09-30 PROCEDURE — C1713: CPT

## 2021-09-30 PROCEDURE — 83036 HEMOGLOBIN GLYCOSYLATED A1C: CPT

## 2021-09-30 PROCEDURE — 97116 GAIT TRAINING THERAPY: CPT

## 2021-09-30 PROCEDURE — 76000 FLUOROSCOPY <1 HR PHYS/QHP: CPT

## 2021-09-30 PROCEDURE — C1769: CPT

## 2021-09-30 PROCEDURE — 82962 GLUCOSE BLOOD TEST: CPT

## 2021-09-30 PROCEDURE — 80048 BASIC METABOLIC PNL TOTAL CA: CPT

## 2021-09-30 PROCEDURE — 97530 THERAPEUTIC ACTIVITIES: CPT

## 2021-09-30 PROCEDURE — C1889: CPT

## 2021-09-30 PROCEDURE — 85025 COMPLETE CBC W/AUTO DIFF WBC: CPT

## 2021-09-30 PROCEDURE — 85027 COMPLETE CBC AUTOMATED: CPT

## 2021-09-30 PROCEDURE — 81001 URINALYSIS AUTO W/SCOPE: CPT

## 2021-09-30 PROCEDURE — 97161 PT EVAL LOW COMPLEX 20 MIN: CPT

## 2021-09-30 PROCEDURE — 97165 OT EVAL LOW COMPLEX 30 MIN: CPT

## 2021-09-30 RX ORDER — OXYCODONE HYDROCHLORIDE 5 MG/1
1 TABLET ORAL
Qty: 30 | Refills: 0
Start: 2021-09-30 | End: 2021-10-09

## 2021-09-30 RX ORDER — SENNA PLUS 8.6 MG/1
2 TABLET ORAL
Qty: 0 | Refills: 0 | DISCHARGE
Start: 2021-09-30

## 2021-09-30 RX ADMIN — GABAPENTIN 300 MILLIGRAM(S): 400 CAPSULE ORAL at 13:29

## 2021-09-30 RX ADMIN — Medication 4 MILLIGRAM(S): at 05:30

## 2021-09-30 RX ADMIN — Medication 100 MILLIGRAM(S): at 09:32

## 2021-09-30 RX ADMIN — Medication 100 MILLIGRAM(S): at 01:59

## 2021-09-30 RX ADMIN — GABAPENTIN 300 MILLIGRAM(S): 400 CAPSULE ORAL at 05:30

## 2021-09-30 RX ADMIN — Medication 4 MILLIGRAM(S): at 11:10

## 2021-09-30 NOTE — DISCHARGE NOTE PROVIDER - NSDCCPTREATMENT_GEN_ALL_CORE_FT
PRINCIPAL PROCEDURE  Procedure: Discectomy of cervical spine by anterior approach  Findings and Treatment: 9/29/2021 C3-4, C4-5, C5-6, C6-7 ACDF

## 2021-09-30 NOTE — OCCUPATIONAL THERAPY INITIAL EVALUATION ADULT - FINE MOTOR COORDINATION TRAINING, OT EVAL
Pt will independently demonstrate thumb opposition of all digits on L hand in preparation for grooming within 4 weeks.

## 2021-09-30 NOTE — DISCHARGE NOTE PROVIDER - NSDCFUADDINST_GEN_ALL_CORE_FT
May Shower october 2 2021   please do not engage in strenuous activity, heavy lifting, drive, or return to work or school until cleared by surgeon.  please keep incision clean and dry, do not submerge wound in water for prolonged periods of time, pat dry after showering, and do not use any creams or ointments to incision.

## 2021-09-30 NOTE — DISCHARGE NOTE NURSING/CASE MANAGEMENT/SOCIAL WORK - PATIENT PORTAL LINK FT
You can access the FollowMyHealth Patient Portal offered by [facility] by registering at the following website: [email or web address]. By joining [employer]’s FollowMyHealth portal, you will also be able to view your health information using other applications (apps) compatible with our system.

## 2021-09-30 NOTE — DISCHARGE NOTE NURSING/CASE MANAGEMENT/SOCIAL WORK - NSPROEXTENSIONSOFSELF_GEN_A_NUR
Timothy Torres is here for Follow up evaluation of potential COVID-19 exposure.        You may return to work with no restrictions.      TM completed 7 day quarantine and has no symptoms. TM may RTW.    Email sent to TM and Manager.     You must wear a procedure mask at all times while in the workplace.    Off work end date: 10/29/20             eyeglasses/hearing aid

## 2021-09-30 NOTE — OCCUPATIONAL THERAPY INITIAL EVALUATION ADULT - TRANSFER TRAINING, PT EVAL
Pt will independently perform a sit-to-stand transfer with RW while adhering to c-spine precautions within 4 weeks.

## 2021-09-30 NOTE — OCCUPATIONAL THERAPY INITIAL EVALUATION ADULT - PERTINENT HX OF CURRENT PROBLEM, REHAB EVAL
70 year old male ([employer] employee) with PMH of BPH s/p UroLift, abdominal pain/ UTI 8/26/2021 with ER visit treated with antibiotics (resolved) with complaints of neck pain radiating to left arm/weakness x 1 month w/ cervical radiculopathy. Pt now s/p C3-4, C4-5, C5-6, C6-7 ACDF.

## 2021-09-30 NOTE — DISCHARGE NOTE PROVIDER - NSDCMRMEDTOKEN_GEN_ALL_CORE_FT
gabapentin 300 mg oral capsule: 1 cap(s) orally 3 times a day  Medrol Dosepak 4 mg oral tablet: sy use as directed   oxyCODONE 5 mg oral tablet: 1 tab(s) orally every 8 hours, As needed, Moderate Pain (4 - 6) MDD:6  Rolling Walker: DX: spine sugery acdf   senna oral tablet: 2 tab(s) orally once a day (at bedtime)  Tylenol 500 mg oral tablet: orally 2 times a day, As Needed

## 2021-09-30 NOTE — DISCHARGE NOTE PROVIDER - CARE PROVIDER_API CALL
Jens Colon (MD)  Neurosurgery  [address], [address]  [place] [address]  Phone: [phone]  Fax: [phone]  Follow Up Time:

## 2021-09-30 NOTE — OCCUPATIONAL THERAPY INITIAL EVALUATION ADULT - BED MOBILITY TRAINING, PT EVAL
Pt will independently demonstrate supine to sit transfer using log roll technique to adhere to c-spine precautions within 4 weeks.

## 2021-09-30 NOTE — DISCHARGE NOTE PROVIDER - HOSPITAL COURSE
70 year old male ( [employer] employee) with Past medical history of BPH s/p UroLift, abdominal pain/ UTI 8/26/2021 with ER visit treated with antibiotics ( resolved, currently symptom free) with complaints of neck pain radiating to left arm/ weakness x 1 month w/ cervical radiculopathy.  Patient had 9/29/2021 C3-4, C4-5, C5-6, C6-7 ACDF.  Patient did well postoperatively but did have new numbness on left hand possible related to infiltrated IV that we elevated.  Patient was seen by physical therapy and recommended for home physical therapy with rolling walker.

## 2021-09-30 NOTE — DISCHARGE NOTE PROVIDER - NSDCCPCAREPLAN_GEN_ALL_CORE_FT
PRINCIPAL DISCHARGE DIAGNOSIS  Diagnosis: Cervical radiculopathy  Assessment and Plan of Treatment: 9/29/2021 C3-4, C4-5, C5-6, C6-7 ACDF

## 2021-09-30 NOTE — OCCUPATIONAL THERAPY INITIAL EVALUATION ADULT - OCCUPATION
Pt lives in a private home with spouse, 4 steps to enter, 2-floor setup, 12 steps to access 2nd floor. Per pt, during recovery he will be staying on the first floor, where there is a recliner and a bathroom. 1st floor bathroom contains a bathtub.

## 2021-09-30 NOTE — DISCHARGE NOTE PROVIDER - NSDCFUADDAPPT_GEN_ALL_CORE_FT
Follow up with Dr. Colon in 1-2 weeks.  Call office for appointment.      Keep cervical collar on at all times when out of bed except eating and showering  Do not lye flat at home.  Keep head of bed elevated

## 2021-09-30 NOTE — PROGRESS NOTE ADULT - SUBJECTIVE AND OBJECTIVE BOX
SUBJECTIVE: Patient seen and examined.  Patient states he has new numbness in left hand after surgery, but his IV w as infiltrated and he has swelling of his left hand.      Vital Signs Last 24 Hrs  T(C): 36.9 (30 Sep 2021 09:16), Max: 37.2 (30 Sep 2021 04:30)  T(F): 98.5 (30 Sep 2021 09:16), Max: 99 (30 Sep 2021 04:30)  HR: 99 (30 Sep 2021 09:16) (82 - 117)  BP: 123/70 (30 Sep 2021 09:16) (112/86 - 159/70)  BP(mean): 99 (29 Sep 2021 19:30) (89 - 110)  RR: 18 (30 Sep 2021 09:16) (16 - 19)  SpO2: 94% (30 Sep 2021 09:16) (93% - 98%)    PHYSICAL EXAM:    Constitutional: No Acute Distress     Neurological: AOx3, Following Commands, Moving all Extremities     Motor exam:          Upper extremity                         Delt     Bicep     Tricep    HG                                                 R         5/5        5/5        5/5       5/5                                               L          5/5        5/5        5/5       5/5          Lower extremity                        HF         KF        KE       DF         PF                                                  R        5/5        5/5        5/5       5/5         5/5                                               L         5/5        5/5       5/5       5/5          5/5                                                 Left hand swollen   Pulmonary: Clear to Auscultation, No rales, No rhonchi, No wheezes     Cardiovascular: S1, S2, Regular rate and rhythm     Gastrointestinal: Soft, Non-tender, Non-distended     Extremities: No calf tenderness     Incision: c/d/i  LABS:                        12.8   13.44 )-----------( 164      ( 30 Sep 2021 05:36 )             40.2    09-30    139  |  104  |  12  ----------------------------<  141<H>  4.6   |  22  |  1.05    Ca    8.7      30 Sep 2021 05:36        09-29 @ 07:01  -  09-30 @ 07:00  --------------------------------------------------------  IN: 1295 mL / OUT: 1455 mL / NET: -160 mL    09-30 @ 07:01  - 09-30 @ 10:48  --------------------------------------------------------  IN: 280 mL / OUT: 0 mL / NET: 280 mL      DRAINS: hmv 80    MEDICATIONS:  Anticoagulation:   enoxaparin Injectable 40 milliGRAM(s) SubCutaneous at bedtime    Antibiotics:    Endo:  dexAMETHasone     Tablet 4 milliGRAM(s) Oral every 6 hours    Neuro:  acetaminophen   Tablet .. 650 milliGRAM(s) Oral every 6 hours PRN Temp greater or equal to 38C (100.4F), Mild Pain (1 - 3)  gabapentin 300 milliGRAM(s) Oral three times a day  oxyCODONE    IR 5 milliGRAM(s) Oral every 8 hours PRN Moderate Pain (4 - 6)  oxyCODONE    IR 10 milliGRAM(s) Oral every 4 hours PRN Severe Pain (7 - 10)    Cardiac:    Pulm:    GI/:  polyethylene glycol 3350 17 Gram(s) Oral two times a day  senna 2 Tablet(s) Oral at bedtime    Other:   influenza   Vaccine 0.5 milliLiter(s) IntraMuscular once    DIET: regular     IMAGING:   
Patient seen and examined s/p C3-7 ACDF.      AOx3, EOMI, RUE 5/5, LUE 4+/5 (pain-limited), Left hand numbness, BLE 5/5, SILT, no Kelly's, no clonus.    T(C): 36.6 (09-29-21 @ 12:57), Max: 36.6 (09-29-21 @ 07:04)  HR: 104 (09-29-21 @ 14:00) (78 - 117)  BP: 134/65 (09-29-21 @ 14:00) (130/82 - 143/85)  RR: 18 (09-29-21 @ 14:00) (17 - 19)  SpO2: 96% (09-29-21 @ 14:00) (95% - 98%)                          13.5   10.41 )-----------( 160      ( 29 Sep 2021 13:52 )             42.0     09-29    140  |  106  |  14  ----------------------------<  164<H>  4.0   |  18<L>  |  1.16    Ca    8.7      29 Sep 2021 13:52              CAPILLARY BLOOD GLUCOSE

## 2021-09-30 NOTE — OCCUPATIONAL THERAPY INITIAL EVALUATION ADULT - LIGHT TOUCH SENSATION, LUE, REHAB EVAL
Mild/moderate impairment in L digits 2/2 new numbness & tingling in all digits. Deficits greater in digits 1-3, deficits greater distally on digits./moderate impairment

## 2021-09-30 NOTE — PROGRESS NOTE ADULT - ASSESSMENT
HPI:  70 year old male ( [employer] employee) with PMH of BPH s/p UroLift, abdominal pain/ UTI 8/26/2021 with ER visit treated with antibiotics ( resolved, currently symptom free) with complaints of neck pain radiating to left arm/ weakness x 1 month w/ cervical radiculopathy planned for C3-7 Anterior cervical discectomy and fusion on 9/29/2021.     **Covid test 9/26/2021 ( will be doing it near home, phone number given)   Denies any recent covid infection or exposure   fully vaccinated    (16 Sep 2021 10:00)    PROCEDURE: c3-7 acdf 9/29       PAST MEDICAL & SURGICAL HISTORY:  Cervical radiculopathy    Cervical osteophyte    Cervical spinal stenosis    BPH (benign prostatic hyperplasia)  s/p UroLift    Enterococcus UTI  8/26/2021 treated with ABx--resolved, symptom free now    Epiploic appendagitis  ER visit 8/26/2021    History of appendectomy  lap          PLAN:  Neuro: neuro and vital checks q 4  - pain control with tylenol and oxycodone  - gabapentin 300 tid for neuropathy  - decadron for laryngeal edema will be discharged on medrol dose pack  - dc hmv today  -cervical collar while out of bed can take it off for eating, taught patient how to put it on  - keep head of bed raised at all times and should not lie flat when at home   Respiratory: incentive spirometry  CV: keep sbp 100- 150   Endocrine: euglycemia  Heme/Onc:   acute blood loss anemia secondary to blood loss is stable           DVT ppx: lovenox, scds  Renal: ivl, dc esteban tov   ID: afebrile  GI:  tolerating diet   PT/OT: outpatient pt with rolling walker dc home today  Will discuss with Dr. Isaiah Kaur # [id number]
Patient seen and examined at bedside s/p C3-7 ACDF, recovering well  -PACU Floor, cleared after 6 hours.  -Q4 neurochecks  -Q4 vitals  -Pain control  -Advance diet as tolerated  -PT/OT  -Wrist splint for L hand numbness

## 2021-09-30 NOTE — OCCUPATIONAL THERAPY INITIAL EVALUATION ADULT - ADL RETRAINING, OT EVAL
Pt will independently perform UE dressing with c-collar while maintaining c-spine precautions within 4 weeks.

## 2021-10-12 ENCOUNTER — APPOINTMENT (OUTPATIENT)
Dept: SPINE | Facility: CLINIC | Age: 71
End: 2021-10-12
Payer: COMMERCIAL

## 2021-10-12 VITALS
HEIGHT: 68 IN | OXYGEN SATURATION: 96 % | SYSTOLIC BLOOD PRESSURE: 123 MMHG | WEIGHT: 240 LBS | HEART RATE: 81 BPM | DIASTOLIC BLOOD PRESSURE: 85 MMHG | BODY MASS INDEX: 36.37 KG/M2

## 2021-10-12 PROCEDURE — 99024 POSTOP FOLLOW-UP VISIT: CPT

## 2021-10-12 NOTE — REVIEW OF SYSTEMS
[Hand Weakness] :  hand weakness [Numbness] : numbness [Tingling] : tingling [Negative] : Heme/Lymph [de-identified] : neck and shoulder stiffness

## 2021-10-12 NOTE — ASSESSMENT
[FreeTextEntry1] : \par \par 71 year old male now 13 days post-op following a four level ACDF of C 3 to C 7.  Left arm radicular pain decreased.  He will maintain his aspen collar on during the day and remove at night.  Cyclobenzaprine was ordered and side effects were reviewed.  He will not drive until he receives further instructions to remove the collar at the six week follow up period.  No lifting over ten pounds. No aggressive motion of his neck.  He will return in two weeks with a AP and lateral Cervical xray.

## 2021-10-12 NOTE — PHYSICAL EXAM
[General Appearance - Alert] : alert [General Appearance - In No Acute Distress] : in no acute distress [Clean] : clean [Dry] : dry [Healing Well] : healing well [Intact] : intact [No Drainage] : without drainage [Normal Skin] : normal [Erythema] : not erythematous [Tender] : not tender [Warm] : not warm [Oriented To Time, Place, And Person] : oriented to person, place, and time [Impaired Insight] : insight and judgment were intact [Affect] : the affect was normal [Person] : oriented to person [Place] : oriented to place [Time] : oriented to time [Cranial Nerves Optic (II)] : visual acuity intact bilaterally,  pupils equal round and reactive to light [Cranial Nerves Vestibulocochlear (VIII)] : hearing was intact bilaterally [Cranial Nerves Accessory (XI - Cranial And Spinal)] : head turning and shoulder shrug symmetric [Involuntary Movements] : no involuntary movements were seen [Sensation Tactile Decrease] : light touch was intact [Balance] : balance was intact [FreeTextEntry6] : Left hand  weakness and tricep weakness 4/5 [No Visual Abnormalities] : no visible abnormailities [Sclera] : the sclera and conjunctiva were normal [Outer Ear] : the ears and nose were normal in appearance [Neck Appearance] : the appearance of the neck was normal [FreeTextEntry1] : collar intact  [] : no respiratory distress [Abnormal Walk] : normal gait [Skin Color & Pigmentation] : normal skin color and pigmentation

## 2021-10-12 NOTE — REASON FOR VISIT
[Spouse] : spouse [de-identified] : 9/29/2021  [de-identified] : 12 [de-identified] : 2 [de-identified] : C 3 to C 7 anterior cervical diskectomy with allograft autograft interbody arthrodesis , C 3 to C 7 titanium anterior cervical screw plate fixation, C 3 to C 7 TETRAfuse interbody prosthesis.

## 2021-10-12 NOTE — HISTORY OF PRESENT ILLNESS
[FreeTextEntry1] : \par \par Mr. Perez is here today for his first postop visit following a C 3 to C 7 ACDF with screw plate.  He has a reduction of pain on his left arm and hand, with a decreased sensation of numbness and tingling.  He has no swallowing difficulty.  He remains taking Gabapentin 300 mg three times a day.   Upon his first day home at discharge he had bouts of loose stool which is now completely resolved.  He reports shoulder pain and stiffness of his left  side that radiates into his bicep.  No weakness is present.   Preop pain of his left arm radiating into his elbow is completely resolved since surgery.  he is wearing a Apen collar.  The anterior neck incision is clean and flat with steri strips in place and free of drainage.

## 2021-10-16 ENCOUNTER — TRANSCRIPTION ENCOUNTER (OUTPATIENT)
Age: 71
End: 2021-10-16

## 2021-10-19 ENCOUNTER — APPOINTMENT (OUTPATIENT)
Dept: CT IMAGING | Facility: CLINIC | Age: 71
End: 2021-10-19
Payer: COMMERCIAL

## 2021-10-19 ENCOUNTER — APPOINTMENT (OUTPATIENT)
Dept: CT IMAGING | Facility: CLINIC | Age: 71
End: 2021-10-19

## 2021-10-19 ENCOUNTER — RESULT REVIEW (OUTPATIENT)
Age: 71
End: 2021-10-19

## 2021-10-19 PROCEDURE — 74176 CT ABD & PELVIS W/O CONTRAST: CPT

## 2021-10-26 ENCOUNTER — OUTPATIENT (OUTPATIENT)
Dept: OUTPATIENT SERVICES | Facility: HOSPITAL | Age: 71
LOS: 1 days | End: 2021-10-26
Payer: COMMERCIAL

## 2021-10-26 ENCOUNTER — APPOINTMENT (OUTPATIENT)
Dept: RADIOLOGY | Facility: CLINIC | Age: 71
End: 2021-10-26
Payer: COMMERCIAL

## 2021-10-26 DIAGNOSIS — Z90.49 ACQUIRED ABSENCE OF OTHER SPECIFIED PARTS OF DIGESTIVE TRACT: Chronic | ICD-10-CM

## 2021-10-26 DIAGNOSIS — M48.00 SPINAL STENOSIS, SITE UNSPECIFIED: ICD-10-CM

## 2021-10-26 DIAGNOSIS — M54.12 RADICULOPATHY, CERVICAL REGION: ICD-10-CM

## 2021-10-26 DIAGNOSIS — M54.2 CERVICALGIA: ICD-10-CM

## 2021-10-26 PROCEDURE — 72040 X-RAY EXAM NECK SPINE 2-3 VW: CPT

## 2021-10-26 PROCEDURE — 72040 X-RAY EXAM NECK SPINE 2-3 VW: CPT | Mod: 26

## 2021-10-28 ENCOUNTER — APPOINTMENT (OUTPATIENT)
Dept: SPINE | Facility: CLINIC | Age: 71
End: 2021-10-28
Payer: COMMERCIAL

## 2021-10-28 VITALS
SYSTOLIC BLOOD PRESSURE: 115 MMHG | WEIGHT: 240 LBS | BODY MASS INDEX: 36.37 KG/M2 | DIASTOLIC BLOOD PRESSURE: 77 MMHG | HEIGHT: 68 IN | OXYGEN SATURATION: 94 % | HEART RATE: 88 BPM

## 2021-10-28 PROCEDURE — 99024 POSTOP FOLLOW-UP VISIT: CPT

## 2021-10-28 NOTE — REASON FOR VISIT
[Family Member] : family member [de-identified] : 9/29/2021  [de-identified] : 3 [de-identified] : C3-C7 anterior cervical diskectomy with allograft autograft interbody arthrodesis, C3-C7 titanium anterior cervical screw plate fixation, C3-C7 TETRAfuse interbody prosthesis.

## 2021-10-28 NOTE — ASSESSMENT
[FreeTextEntry1] : One month post op ACDF with anterior plate C 3 to C 7.    Doing well.  He will return in six weeks with a updated cervical xray.  Maintain miami J collar for another six weeks.  Stool sample prescription was provided for GI distress during his first day home but is resolved.

## 2021-10-28 NOTE — HISTORY OF PRESENT ILLNESS
[FreeTextEntry1] : One month postop cervical fusion C 3 to C 7 with anterior plate.  He reports no pain and tingling and numbness of his left arm .  He has no swallowing issues.  His incision is clean and dry.  He is wearing a [place] J collar.  Cervical xrays show stable hardware and good construct.  He is only taking Tylenol for pain/discomfort.

## 2021-10-28 NOTE — PHYSICAL EXAM
[Well-Healed] : well-healed [Motor Strength] : muscle strength was normal in all four extremities [Sensation Tactile Decrease] : light touch was intact [Abnormal Walk] : normal gait

## 2021-10-28 NOTE — END OF VISIT
[FreeTextEntry3] : I, Dr. Jens Colon, evaluated this patient with the Nurse Practitioner, Kim Lombardo, and established the plan of care.  I personally discussed this patient  during the key portions of the history and exam with the Nurse Practitioner at the time of the visit.  I agree with the assessment and plan as written, unless noted below.\par

## 2021-11-05 ENCOUNTER — NON-APPOINTMENT (OUTPATIENT)
Age: 71
End: 2021-11-05

## 2021-12-01 ENCOUNTER — APPOINTMENT (OUTPATIENT)
Dept: GASTROENTEROLOGY | Facility: CLINIC | Age: 71
End: 2021-12-01

## 2021-12-06 ENCOUNTER — APPOINTMENT (OUTPATIENT)
Dept: RADIOLOGY | Facility: CLINIC | Age: 71
End: 2021-12-06
Payer: COMMERCIAL

## 2021-12-06 ENCOUNTER — OUTPATIENT (OUTPATIENT)
Dept: OUTPATIENT SERVICES | Facility: HOSPITAL | Age: 71
LOS: 1 days | End: 2021-12-06
Payer: COMMERCIAL

## 2021-12-06 DIAGNOSIS — Z78.9 OTHER SPECIFIED HEALTH STATUS: ICD-10-CM

## 2021-12-06 DIAGNOSIS — Z90.49 ACQUIRED ABSENCE OF OTHER SPECIFIED PARTS OF DIGESTIVE TRACT: Chronic | ICD-10-CM

## 2021-12-06 PROCEDURE — 72040 X-RAY EXAM NECK SPINE 2-3 VW: CPT | Mod: 26

## 2021-12-06 PROCEDURE — 72040 X-RAY EXAM NECK SPINE 2-3 VW: CPT

## 2021-12-09 ENCOUNTER — APPOINTMENT (OUTPATIENT)
Dept: SPINE | Facility: CLINIC | Age: 71
End: 2021-12-09
Payer: COMMERCIAL

## 2021-12-09 VITALS
SYSTOLIC BLOOD PRESSURE: 110 MMHG | HEART RATE: 84 BPM | OXYGEN SATURATION: 94 % | BODY MASS INDEX: 36.37 KG/M2 | HEIGHT: 68 IN | WEIGHT: 240 LBS | DIASTOLIC BLOOD PRESSURE: 74 MMHG

## 2021-12-09 DIAGNOSIS — G89.29 CERVICALGIA: ICD-10-CM

## 2021-12-09 DIAGNOSIS — M54.2 CERVICALGIA: ICD-10-CM

## 2021-12-09 PROCEDURE — 99024 POSTOP FOLLOW-UP VISIT: CPT

## 2021-12-09 NOTE — HISTORY OF PRESENT ILLNESS
[FreeTextEntry1] : \par \par 2.5 months postop anterior plating and fusion C 3 to C 7 .   Reporting no UE weakness.  Tingling in his left hand is present.  Cervical xrays show excellent alignment and stable hardware.   His neck motion is preserved with slight muscle pain of his posterior neck area.  Slight limited motion of flexion forward  to be expected.  Stool cultures  ordered for chronic diahrrea were negative.  He is walking without assistance.  He does no attend PT.

## 2021-12-09 NOTE — REASON FOR VISIT
[Spouse] : spouse [de-identified] : 9/29/021 [de-identified] : 9 [de-identified] : C3-C7 anterior cervical diskectomy with allograft autograft interbody arthrodesis, C3-C7 titanium anterior cervical screw plate fixation, C3-C7 TETRAfuse interbody prosthesis.

## 2021-12-09 NOTE — ASSESSMENT
[FreeTextEntry1] : \par 2.5 months postop following a ACDF C 3 to C 7.  Significantly improved and resolution of arm radicular pain.  May lift five to ten pounds .  Increase walking .  Return in three months with a cervical xray flexion/extension.  Continue treadmill use.  He may drive.  He was instructed to avoid performing  sit ups and push ups.  Follow up with GI .

## 2021-12-09 NOTE — PHYSICAL EXAM
[General Appearance - Alert] : alert [General Appearance - In No Acute Distress] : in no acute distress [Longitudinal] : longitudinal [Clean] : clean [Healing Well] : healing well [Intact] : intact [No Drainage] : without drainage [Normal Skin] : normal [Person] : oriented to person [Place] : oriented to place [Time] : oriented to time [Motor Tone] : muscle tone was normal in all four extremities [Motor Strength] : muscle strength was normal in all four extremities [Involuntary Movements] : no involuntary movements were seen [Sensation Tactile Decrease] : light touch was intact [Balance] : balance was intact [No Visual Abnormalities] : no visible abnormailities [Full ROM] : full ROM [Normal] : normal [Sclera] : the sclera and conjunctiva were normal [Outer Ear] : the ears and nose were normal in appearance [Neck Appearance] : the appearance of the neck was normal [] : no respiratory distress [Abnormal Walk] : normal gait [Skin Color & Pigmentation] : normal skin color and pigmentation [Erythema] : not erythematous [Tender] : not tender [Warm] : not warm [Indurated] : not indurated [FreeTextEntry1] : limited ROM of flexion

## 2022-01-18 ENCOUNTER — APPOINTMENT (OUTPATIENT)
Dept: OTOLARYNGOLOGY | Facility: CLINIC | Age: 72
End: 2022-01-18

## 2022-01-18 ENCOUNTER — APPOINTMENT (OUTPATIENT)
Dept: PHARMACY | Facility: CLINIC | Age: 72
End: 2022-01-18
Payer: SELF-PAY

## 2022-01-18 PROCEDURE — V5010 ASSESSMENT FOR HEARING AID: CPT | Mod: NC

## 2022-01-27 ENCOUNTER — APPOINTMENT (OUTPATIENT)
Dept: GASTROENTEROLOGY | Facility: CLINIC | Age: 72
End: 2022-01-27
Payer: COMMERCIAL

## 2022-01-27 VITALS
OXYGEN SATURATION: 97 % | HEIGHT: 68 IN | SYSTOLIC BLOOD PRESSURE: 125 MMHG | HEART RATE: 85 BPM | DIASTOLIC BLOOD PRESSURE: 72 MMHG | TEMPERATURE: 97.7 F | WEIGHT: 224 LBS | BODY MASS INDEX: 33.95 KG/M2

## 2022-01-27 DIAGNOSIS — R19.4 CHANGE IN BOWEL HABIT: ICD-10-CM

## 2022-01-27 DIAGNOSIS — K63.89 OTHER SPECIFIED DISEASES OF INTESTINE: ICD-10-CM

## 2022-01-27 PROCEDURE — 99204 OFFICE O/P NEW MOD 45 MIN: CPT

## 2022-01-27 NOTE — HISTORY OF PRESENT ILLNESS
[de-identified] : 71-year-old male, presents for altered bowel habit, gas\par Past GI history including colonoscopy with polypectomy couple of years ago with Dr. Tirado\par Patient may be due for follow-up examination\par Patient also with history of epiploic appendagitis identified this past summer\par \par For this visit, patient with resolving complaints of altered bowel habit, mainly looser stools, urgency, frequency following cervical disc surgery in September\par Antibiotics received at that time, but patient does not recall being discharged on  any antibiotic following his surgery\par Also only short-term use of pain medication\par Patient himself stopped all of his medication when the bowel complaints develop\par Has slowly seen a return towards more normal stools over time\par No bleeding\par Some postoperative weight loss, now regained\par Stool testing ordered by his neurosurgeon at that time of most severe complaints, C. difficile negative stool culture, ova and parasites negative\par No specific food intolerances noted\par \par Social history: Works for [employer], purchasing

## 2022-01-27 NOTE — PHYSICAL EXAM
[General Appearance - Alert] : alert [General Appearance - In No Acute Distress] : in no acute distress [Sclera] : the sclera and conjunctiva were normal [PERRL With Normal Accommodation] : pupils were equal in size, round, and reactive to light [Extraocular Movements] : extraocular movements were intact [Outer Ear] : the ears and nose were normal in appearance [Oropharynx] : the oropharynx was normal [Neck Appearance] : the appearance of the neck was normal [Neck Cervical Mass (___cm)] : no neck mass was observed [Jugular Venous Distention Increased] : there was no jugular-venous distention [Thyroid Diffuse Enlargement] : the thyroid was not enlarged [Thyroid Nodule] : there were no palpable thyroid nodules [FreeTextEntry1] : Scar [Auscultation Breath Sounds / Voice Sounds] : lungs were clear to auscultation bilaterally [Heart Rate And Rhythm] : heart rate was normal and rhythm regular [Heart Sounds] : normal S1 and S2 [Heart Sounds Gallop] : no gallops [Murmurs] : no murmurs [Heart Sounds Pericardial Friction Rub] : no pericardial rub [Edema] : there was no peripheral edema [Bowel Sounds] : normal bowel sounds [Abdomen Soft] : soft [Abdomen Tenderness] : non-tender [Abdomen Mass (___ Cm)] : no abdominal mass palpated [Cervical Lymph Nodes Enlarged Posterior Bilaterally] : posterior cervical [Cervical Lymph Nodes Enlarged Anterior Bilaterally] : anterior cervical [Supraclavicular Lymph Nodes Enlarged Bilaterally] : supraclavicular [Axillary Lymph Nodes Enlarged Bilaterally] : axillary [Femoral Lymph Nodes Enlarged Bilaterally] : femoral [Inguinal Lymph Nodes Enlarged Bilaterally] : inguinal [No CVA Tenderness] : no ~M costovertebral angle tenderness [No Spinal Tenderness] : no spinal tenderness [Abnormal Walk] : normal gait [Nail Clubbing] : no clubbing  or cyanosis of the fingernails [Musculoskeletal - Swelling] : no joint swelling seen [Motor Tone] : muscle strength and tone were normal [Skin Color & Pigmentation] : normal skin color and pigmentation [Skin Turgor] : normal skin turgor [] : no rash [Oriented To Time, Place, And Person] : oriented to person, place, and time [Impaired Insight] : insight and judgment were intact [Affect] : the affect was normal

## 2022-01-27 NOTE — ASSESSMENT
[FreeTextEntry1] : Altered bowel habits, gas, suspect related to postoperative antibiotic use, even in the absence of C. difficile\par Of note, patient reports history of frequent urinary tract infections, though denies any antibiotic use in the past few months for that issue, recent UroLift surgery noted\par At this time, doubt but cannot rule out other etiologies such as inflammatory bowel disease, exocrine pancreatic insufficiency, chronic infection, bacterial overgrowth\par \par Plan\par Stool testing as ordered\par Telephone follow-up after completion of stool testing\par Obtain medical records from prior gastroenterology\par \par  further recommendations to follow\par \par \par Patient referred by Dr. Trisha Beebe\par

## 2022-01-29 LAB — GI PCR PANEL, STOOL: NORMAL

## 2022-02-01 LAB — CALPROTECTIN FECAL: 116 UG/G

## 2022-02-03 ENCOUNTER — APPOINTMENT (OUTPATIENT)
Dept: PHARMACY | Facility: CLINIC | Age: 72
End: 2022-02-03
Payer: COMMERCIAL

## 2022-02-03 LAB
FAT STL QN: NORMAL
FAT STL QN: NORMAL

## 2022-02-03 PROCEDURE — V5261B: CUSTOM

## 2022-02-07 ENCOUNTER — NON-APPOINTMENT (OUTPATIENT)
Age: 72
End: 2022-02-07

## 2022-02-07 LAB — PANCREATIC ELASTASE, FECAL: 377

## 2022-02-10 ENCOUNTER — NON-APPOINTMENT (OUTPATIENT)
Age: 72
End: 2022-02-10

## 2022-02-15 ENCOUNTER — NON-APPOINTMENT (OUTPATIENT)
Age: 72
End: 2022-02-15

## 2022-02-16 VITALS — WEIGHT: 222 LBS | HEIGHT: 68 IN | BODY MASS INDEX: 33.65 KG/M2

## 2022-02-16 NOTE — HISTORY OF PRESENT ILLNESS
[TextBox_13] : Referred by Dr. Trisha Beebe\par \par Mr. MARINELLI is a 71 year old male with a history of high cholesterol.\par \par He was called to review eligibility for Low-Dose CT lung cancer screening.  Reviewed and confirmed that the patient meets screening eligibility criteria:\par \par 71 years old \par \par Smoking Status: Former smoker \par \par Number of pack(s) per day: 1.5\par Number of years smoked: 50\par Number of pack years smokin\par \par Number of years since quitting smokin\par Quit year: \par \par Mr. MARINELLI denies any symptoms of lung cancer, including new cough, change in cough, hemoptysis, and unintentional weight loss.\par \par Mr. MARINELLI denies any personal history of lung cancer.  Admits to lung cancer in first degree relatives, his mother and father.  Denies any history of lung disease or any history of occupational exposures.

## 2022-02-23 ENCOUNTER — APPOINTMENT (OUTPATIENT)
Dept: ULTRASOUND IMAGING | Facility: CLINIC | Age: 72
End: 2022-02-23
Payer: COMMERCIAL

## 2022-02-23 ENCOUNTER — APPOINTMENT (OUTPATIENT)
Dept: CT IMAGING | Facility: CLINIC | Age: 72
End: 2022-02-23
Payer: COMMERCIAL

## 2022-02-23 ENCOUNTER — OUTPATIENT (OUTPATIENT)
Dept: OUTPATIENT SERVICES | Facility: HOSPITAL | Age: 72
LOS: 1 days | End: 2022-02-23
Payer: COMMERCIAL

## 2022-02-23 DIAGNOSIS — Z00.8 ENCOUNTER FOR OTHER GENERAL EXAMINATION: ICD-10-CM

## 2022-02-23 DIAGNOSIS — Z90.49 ACQUIRED ABSENCE OF OTHER SPECIFIED PARTS OF DIGESTIVE TRACT: Chronic | ICD-10-CM

## 2022-02-23 PROCEDURE — 71271 CT THORAX LUNG CANCER SCR C-: CPT | Mod: 26

## 2022-02-23 PROCEDURE — 76775 US EXAM ABDO BACK WALL LIM: CPT

## 2022-02-23 PROCEDURE — 76775 US EXAM ABDO BACK WALL LIM: CPT | Mod: 26

## 2022-02-23 PROCEDURE — 71271 CT THORAX LUNG CANCER SCR C-: CPT

## 2022-03-09 ENCOUNTER — OUTPATIENT (OUTPATIENT)
Dept: OUTPATIENT SERVICES | Facility: HOSPITAL | Age: 72
LOS: 1 days | End: 2022-03-09
Payer: COMMERCIAL

## 2022-03-09 ENCOUNTER — APPOINTMENT (OUTPATIENT)
Dept: RADIOLOGY | Facility: CLINIC | Age: 72
End: 2022-03-09
Payer: COMMERCIAL

## 2022-03-09 DIAGNOSIS — M54.12 RADICULOPATHY, CERVICAL REGION: ICD-10-CM

## 2022-03-09 DIAGNOSIS — M54.2 CERVICALGIA: ICD-10-CM

## 2022-03-09 DIAGNOSIS — Z90.49 ACQUIRED ABSENCE OF OTHER SPECIFIED PARTS OF DIGESTIVE TRACT: Chronic | ICD-10-CM

## 2022-03-09 PROCEDURE — 72052 X-RAY EXAM NECK SPINE 6/>VWS: CPT

## 2022-03-09 PROCEDURE — 72052 X-RAY EXAM NECK SPINE 6/>VWS: CPT | Mod: 26

## 2022-03-10 ENCOUNTER — APPOINTMENT (OUTPATIENT)
Dept: SPINE | Facility: CLINIC | Age: 72
End: 2022-03-10
Payer: COMMERCIAL

## 2022-03-10 VITALS
SYSTOLIC BLOOD PRESSURE: 137 MMHG | HEIGHT: 68 IN | DIASTOLIC BLOOD PRESSURE: 81 MMHG | BODY MASS INDEX: 33.65 KG/M2 | WEIGHT: 222 LBS | OXYGEN SATURATION: 93 % | HEART RATE: 72 BPM

## 2022-03-10 PROCEDURE — 99212 OFFICE O/P EST SF 10 MIN: CPT

## 2022-03-10 RX ORDER — CYCLOBENZAPRINE HYDROCHLORIDE 10 MG/1
10 TABLET, FILM COATED ORAL
Qty: 30 | Refills: 0 | Status: DISCONTINUED | COMMUNITY
Start: 2021-10-12 | End: 2022-03-10

## 2022-03-10 RX ORDER — NITROFURANTOIN (MONOHYDRATE/MACROCRYSTALS) 25; 75 MG/1; MG/1
100 CAPSULE ORAL DAILY
Qty: 21 | Refills: 0 | Status: DISCONTINUED | COMMUNITY
Start: 2020-10-12 | End: 2022-03-10

## 2022-03-10 RX ORDER — GABAPENTIN 300 MG/1
300 CAPSULE ORAL 3 TIMES DAILY
Qty: 180 | Refills: 3 | Status: DISCONTINUED | COMMUNITY
Start: 2021-08-12 | End: 2022-03-10

## 2022-03-10 RX ORDER — METHYLPREDNISOLONE 4 MG/1
4 TABLET ORAL
Qty: 1 | Refills: 0 | Status: DISCONTINUED | COMMUNITY
Start: 2021-08-06 | End: 2022-03-10

## 2022-03-10 NOTE — REASON FOR VISIT
[Follow-Up: _____] : a [unfilled] follow-up visit [Family Member] : family member [FreeTextEntry1] : 5.5 months postop anterior plating and fusion C 3 to C 7 . Reporting no UE weakness. Ongoing tingling in his left hand is present. Cervical xrays reviewed today. X-rays show no change in hardware or alignment. There is no obvious movement on flexion extension.

## 2022-03-10 NOTE — ASSESSMENT
[FreeTextEntry1] : 5.5 months postop ACDF C 3 to C 7. May lift 10 to 20 pounds. Increase walking. He may drive. Can start golfing. He was instructed to avoid performing sit ups and push ups.  He will return in 3 months with cervical xrays flex and ext

## 2022-03-10 NOTE — PHYSICAL EXAM
[Motor Strength] : muscle strength was normal in all four extremities [Abnormal Walk] : normal gait [FreeTextEntry7] : decreased light touch fingers

## 2022-03-28 ENCOUNTER — RESULT REVIEW (OUTPATIENT)
Age: 72
End: 2022-03-28

## 2022-03-28 LAB
BACTERIA STL CULT: NORMAL
C DIFF TOX B STL QL CT TISS CULT: NORMAL
DEPRECATED O AND P PREP STL: NORMAL
Lab: NORMAL

## 2022-06-16 ENCOUNTER — APPOINTMENT (OUTPATIENT)
Dept: SPINE | Facility: CLINIC | Age: 72
End: 2022-06-16

## 2022-08-16 ENCOUNTER — NON-APPOINTMENT (OUTPATIENT)
Age: 72
End: 2022-08-16

## 2022-08-22 ENCOUNTER — NON-APPOINTMENT (OUTPATIENT)
Age: 72
End: 2022-08-22

## 2022-08-26 ENCOUNTER — APPOINTMENT (OUTPATIENT)
Dept: RADIOLOGY | Facility: CLINIC | Age: 72
End: 2022-08-26

## 2022-08-26 ENCOUNTER — OUTPATIENT (OUTPATIENT)
Dept: OUTPATIENT SERVICES | Facility: HOSPITAL | Age: 72
LOS: 1 days | End: 2022-08-26
Payer: COMMERCIAL

## 2022-08-26 DIAGNOSIS — Z90.49 ACQUIRED ABSENCE OF OTHER SPECIFIED PARTS OF DIGESTIVE TRACT: Chronic | ICD-10-CM

## 2022-08-26 DIAGNOSIS — Z98.1 ARTHRODESIS STATUS: ICD-10-CM

## 2022-08-26 DIAGNOSIS — Z00.8 ENCOUNTER FOR OTHER GENERAL EXAMINATION: ICD-10-CM

## 2022-08-26 PROCEDURE — 72052 X-RAY EXAM NECK SPINE 6/>VWS: CPT

## 2022-08-26 PROCEDURE — 72052 X-RAY EXAM NECK SPINE 6/>VWS: CPT | Mod: 26

## 2022-09-01 ENCOUNTER — RESULT REVIEW (OUTPATIENT)
Age: 72
End: 2022-09-01

## 2022-09-01 ENCOUNTER — APPOINTMENT (OUTPATIENT)
Dept: SPINE | Facility: CLINIC | Age: 72
End: 2022-09-01

## 2022-09-01 ENCOUNTER — NON-APPOINTMENT (OUTPATIENT)
Age: 72
End: 2022-09-01

## 2022-09-01 VITALS
DIASTOLIC BLOOD PRESSURE: 76 MMHG | WEIGHT: 213 LBS | SYSTOLIC BLOOD PRESSURE: 109 MMHG | HEART RATE: 73 BPM | BODY MASS INDEX: 32.28 KG/M2 | OXYGEN SATURATION: 95 % | HEIGHT: 68 IN

## 2022-09-01 PROCEDURE — 99213 OFFICE O/P EST LOW 20 MIN: CPT

## 2022-09-01 NOTE — REVIEW OF SYSTEMS
[As Noted in HPI] : as noted in HPI [Numbness] : numbness [Negative] : Endocrine [Abdominal Pain] : no abdominal pain [Vomiting] : no vomiting [Diarrhea] : no diarrhea [Incontinence] : no incontinence [Skin Lesions] : no skin lesions [Easy Bleeding] : no tendency for easy bleeding [Easy Bruising] : no tendency for easy bruising [de-identified] : stable LUE 3,4,5, digits numbness

## 2022-09-01 NOTE — ASSESSMENT
[FreeTextEntry1] : Doing 1 year 11 months status post 4 level ACDF: return next year with follow-up x-rays.

## 2022-09-01 NOTE — REASON FOR VISIT
[Follow-Up: _____] : a [unfilled] follow-up visit [FreeTextEntry1] : 8/26/22 Xrays Cervical flex/ext - for review today in PACS/ [facility]\par \par Doing well, denies pain, incision remains completely healed.  Only c/o residual numbness of Left hand fingers 3, 4, and 5 digits.  Full strength, fully functional.  Recent x-rays show no change in hardware or alignment and no movement through the index levels on flexion and extension.\par \par

## 2022-09-23 ENCOUNTER — NON-APPOINTMENT (OUTPATIENT)
Age: 72
End: 2022-09-23

## 2022-09-25 LAB
APPEARANCE: ABNORMAL
BACTERIA: ABNORMAL
BILIRUBIN URINE: NEGATIVE
BLOOD URINE: ABNORMAL
COLOR: YELLOW
GLUCOSE QUALITATIVE U: NEGATIVE
HYALINE CASTS: 0 /LPF
KETONES URINE: NEGATIVE
LEUKOCYTE ESTERASE URINE: ABNORMAL
MICROSCOPIC-UA: NORMAL
NITRITE URINE: NEGATIVE
PH URINE: 6.5
PROTEIN URINE: ABNORMAL
RED BLOOD CELLS URINE: 3 /HPF
SPECIFIC GRAVITY URINE: 1.02
SQUAMOUS EPITHELIAL CELLS: 1 /HPF
UROBILINOGEN URINE: NORMAL
WHITE BLOOD CELLS URINE: 205 /HPF

## 2022-09-26 LAB — BACTERIA UR CULT: ABNORMAL

## 2022-09-27 ENCOUNTER — APPOINTMENT (OUTPATIENT)
Dept: UROLOGY | Facility: CLINIC | Age: 72
End: 2022-09-27

## 2022-09-30 ENCOUNTER — NON-APPOINTMENT (OUTPATIENT)
Age: 72
End: 2022-09-30

## 2022-12-05 ENCOUNTER — APPOINTMENT (OUTPATIENT)
Dept: UROLOGY | Facility: CLINIC | Age: 72
End: 2022-12-05

## 2022-12-05 VITALS
OXYGEN SATURATION: 96 % | HEART RATE: 85 BPM | HEIGHT: 68 IN | RESPIRATION RATE: 16 BRPM | BODY MASS INDEX: 32.28 KG/M2 | TEMPERATURE: 98.4 F | WEIGHT: 213 LBS | DIASTOLIC BLOOD PRESSURE: 74 MMHG | SYSTOLIC BLOOD PRESSURE: 129 MMHG

## 2022-12-05 PROCEDURE — 99214 OFFICE O/P EST MOD 30 MIN: CPT

## 2022-12-05 RX ORDER — SULFAMETHOXAZOLE AND TRIMETHOPRIM 800; 160 MG/1; MG/1
800-160 TABLET ORAL
Qty: 10 | Refills: 0 | Status: DISCONTINUED | COMMUNITY
Start: 2022-09-26 | End: 2022-12-05

## 2022-12-05 RX ORDER — SILDENAFIL 20 MG/1
20 TABLET ORAL
Qty: 90 | Refills: 1 | Status: ACTIVE | COMMUNITY
Start: 2020-07-17 | End: 1900-01-01

## 2022-12-05 NOTE — PHYSICAL EXAM
[General Appearance - Well Developed] : well developed [General Appearance - Well Nourished] : well nourished [Normal Appearance] : normal appearance [Well Groomed] : well groomed [General Appearance - In No Acute Distress] : no acute distress [Abdomen Soft] : soft [Abdomen Tenderness] : non-tender [Costovertebral Angle Tenderness] : no ~M costovertebral angle tenderness [Urethral Meatus] : meatus normal [Penis Abnormality] : normal uncircumcised penis [Urinary Bladder Findings] : the bladder was normal on palpation [Scrotum] : the scrotum was normal [Testes Mass (___cm)] : there were no testicular masses [Prostate Tenderness] : the prostate was not tender [No Prostate Nodules] : no prostate nodules [Prostate Enlarged] : was enlarged [FreeTextEntry1] : COLTON done previously [] : no respiratory distress [Respiration, Rhythm And Depth] : normal respiratory rhythm and effort [Exaggerated Use Of Accessory Muscles For Inspiration] : no accessory muscle use [Oriented To Time, Place, And Person] : oriented to person, place, and time [Affect] : the affect was normal [Mood] : the mood was normal [Not Anxious] : not anxious

## 2022-12-05 NOTE — HISTORY OF PRESENT ILLNESS
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  He was last seen in 2020.  Cystoscopy could not be completed due to presence of urethral strictures.  Recently again he noticed that there was an odor to his urine and urine culture showed E. coli.  He was treated with Bactrim.  However he did not have any other significant symptoms.  CT scan in 2021 showed a left bladder diverticula and renal cysts.  He is due for PSA level.  Residual urine volume today was less than 100 mL.  He has used sildenafil 60 to 100 mg in the past for erectile dysfunction.  He has received many courses of antibiotics for persistent bacteriuria.  He had urinary tract infections prior to UroLift procedure.\par \par Previous notes: In 2018, he underwent Urolift procedure which initially helped with urinary flow. Now again his urinary flow is slow and he has noticed recurrence of urinary odor. In 2017, PSA level was 1.9. Creatinine was 1.3. Multiple urine cultures previously showed Escherichia coli. Also he has mild erectile dysfunction.

## 2022-12-05 NOTE — ASSESSMENT
[FreeTextEntry1] : His urinary symptoms have remained relatively stable.  Residual urine volume is minimal.  Urine culture will be repeated but he should continue to monitor it since antibiotics have not been able to eradicate bacteriuria.  PSA level will be checked.  Also he is aware that cystoscopy could not be completed due to urethral stricture previously.  Sildenafil was refilled.  He can follow-up in 6 months to 1 year depending on his symptoms.

## 2022-12-06 LAB
APPEARANCE: ABNORMAL
BACTERIA: ABNORMAL
BILIRUBIN URINE: NEGATIVE
BLOOD URINE: ABNORMAL
COLOR: YELLOW
GLUCOSE QUALITATIVE U: NEGATIVE
HYALINE CASTS: 1 /LPF
KETONES URINE: NEGATIVE
LEUKOCYTE ESTERASE URINE: ABNORMAL
MICROSCOPIC-UA: NORMAL
NITRITE URINE: POSITIVE
PH URINE: 6
PROTEIN URINE: ABNORMAL
PSA SERPL-MCNC: 0.74 NG/ML
RED BLOOD CELLS URINE: 12 /HPF
SPECIFIC GRAVITY URINE: 1.03
SQUAMOUS EPITHELIAL CELLS: 3 /HPF
URINE COMMENTS: NORMAL
UROBILINOGEN URINE: NORMAL
WHITE BLOOD CELLS URINE: 425 /HPF

## 2022-12-09 LAB — BACTERIA UR CULT: ABNORMAL

## 2022-12-09 NOTE — PRE-OP CHECKLIST - NS PREOP CHK MONITOR ANESTHESIA CONSENT
FAMILY HISTORY:  Father  Still living? Unknown  FH: prostate cancer, Age at diagnosis: Age Unknown    
done

## 2023-01-04 ENCOUNTER — NON-APPOINTMENT (OUTPATIENT)
Age: 73
End: 2023-01-04

## 2023-02-11 NOTE — HISTORY OF PRESENT ILLNESS
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 Despite treatment with multiple courses of antibiotics, he continues to have malodorous urine and increased urinary frequency. The last urine culture showed enterococcus and he was treated with Macrobid. He also has history of urethral stricture. Last few urine cultures were contaminated and he is here today for a catheterized urine sample.\par Previous notes: In 2018, he underwent Urolift procedure which initially helped with urinary flow. Now again his urinary flow is slow and he has noticed recurrence of urinary odor. Nocturia is minimal. Residual urine was minimal. In 2017, PSA level was 1.9. Creatinine was 1.3. Multiple urine cultures previously showed Escherichia coli. Also he has mild erectile dysfunction. He is otherwise healthy.
no fever and no chills.

## 2023-04-17 ENCOUNTER — NON-APPOINTMENT (OUTPATIENT)
Age: 73
End: 2023-04-17

## 2023-04-18 ENCOUNTER — NON-APPOINTMENT (OUTPATIENT)
Age: 73
End: 2023-04-18

## 2023-04-19 LAB
APPEARANCE: ABNORMAL
BACTERIA: ABNORMAL
BILIRUBIN URINE: NEGATIVE
BLOOD URINE: ABNORMAL
COLOR: NORMAL
GLUCOSE QUALITATIVE U: NEGATIVE
GRANULAR CASTS: 0 /LPF
HYALINE CASTS: 0 /LPF
KETONES URINE: NEGATIVE
LEUKOCYTE ESTERASE URINE: ABNORMAL
MICROSCOPIC-UA: NORMAL
NITRITE URINE: NEGATIVE
PH URINE: 6
PROTEIN URINE: ABNORMAL
RED BLOOD CELLS URINE: 15 /HPF
SPECIFIC GRAVITY URINE: >=1.03
SQUAMOUS EPITHELIAL CELLS: 0 /HPF
UROBILINOGEN URINE: NORMAL
WHITE BLOOD CELLS URINE: >720 /HPF

## 2023-04-20 ENCOUNTER — OUTPATIENT (OUTPATIENT)
Dept: OUTPATIENT SERVICES | Facility: HOSPITAL | Age: 73
LOS: 1 days | End: 2023-04-20
Payer: MEDICARE

## 2023-04-20 ENCOUNTER — NON-APPOINTMENT (OUTPATIENT)
Age: 73
End: 2023-04-20

## 2023-04-20 VITALS
HEART RATE: 86 BPM | RESPIRATION RATE: 15 BRPM | TEMPERATURE: 98 F | SYSTOLIC BLOOD PRESSURE: 117 MMHG | DIASTOLIC BLOOD PRESSURE: 72 MMHG | HEIGHT: 68 IN | WEIGHT: 220.02 LBS | OXYGEN SATURATION: 97 %

## 2023-04-20 DIAGNOSIS — Z90.49 ACQUIRED ABSENCE OF OTHER SPECIFIED PARTS OF DIGESTIVE TRACT: Chronic | ICD-10-CM

## 2023-04-20 DIAGNOSIS — Z98.890 OTHER SPECIFIED POSTPROCEDURAL STATES: Chronic | ICD-10-CM

## 2023-04-20 DIAGNOSIS — R31.0 GROSS HEMATURIA: ICD-10-CM

## 2023-04-20 DIAGNOSIS — Z01.818 ENCOUNTER FOR OTHER PREPROCEDURAL EXAMINATION: ICD-10-CM

## 2023-04-20 LAB — URINE CYTOLOGY: NORMAL

## 2023-04-20 PROCEDURE — G0463: CPT

## 2023-04-20 RX ORDER — GABAPENTIN 400 MG/1
1 CAPSULE ORAL
Qty: 0 | Refills: 0 | DISCHARGE

## 2023-04-20 RX ORDER — SODIUM CHLORIDE 9 MG/ML
1000 INJECTION, SOLUTION INTRAVENOUS
Refills: 0 | Status: DISCONTINUED | OUTPATIENT
Start: 2023-04-25 | End: 2023-05-10

## 2023-04-20 RX ORDER — ACETAMINOPHEN 500 MG
0 TABLET ORAL
Qty: 0 | Refills: 0 | DISCHARGE

## 2023-04-20 RX ORDER — SODIUM CHLORIDE 9 MG/ML
3 INJECTION INTRAMUSCULAR; INTRAVENOUS; SUBCUTANEOUS EVERY 8 HOURS
Refills: 0 | Status: DISCONTINUED | OUTPATIENT
Start: 2023-04-25 | End: 2023-05-10

## 2023-04-20 NOTE — H&P PST ADULT - PROBLEM SELECTOR PLAN 1
Scheduled for cystoscopy and internal urethrotomy on 4/25/2023  Labs pending  Presently on antibiotics for UTI (4/18/2023)  Medical evaluation pending

## 2023-04-20 NOTE — H&P PST ADULT - MUSCULOSKELETAL
Another attempt made to reach patient - same message received.    ROM intact/normal gait/strength 5/5 bilateral upper extremities/strength 5/5 bilateral lower extremities details…

## 2023-04-20 NOTE — H&P PST ADULT - NSICDXPASTMEDICALHX_GEN_ALL_CORE_FT
PAST MEDICAL HISTORY:  BPH (benign prostatic hyperplasia) s/p UroLift    Cervical osteophyte     Cervical radiculopathy     Cervical spinal stenosis     Enterococcus UTI 8/26/2021 treated with ABx--resolved, symptom free now    Epiploic appendagitis ER visit 8/26/2021    [facility]      PAST MEDICAL HISTORY:  BPH (benign prostatic hyperplasia) s/p UroLift    Cervical osteophyte     Cervical radiculopathy     Cervical spinal stenosis     Enterococcus UTI 8/26/2021 treated with ABx--resolved, symptom free now    Epiploic appendagitis ER visit 8/26/2021    [place] (hard of hearing)     Hypothyroid     RBBB

## 2023-04-20 NOTE — H&P PST ADULT - ASSESSMENT
DASI score:    Dental:    [place]: DASI score: ADLS, housework, lawn work, can climb 1-2 flights of stairs, uses treadmill periodically 3x/week as per DASI, METS 6.6    Dental: dentures upper and lower    [place]:

## 2023-04-20 NOTE — H&P PST ADULT - NEUROLOGICAL COMMENTS
numbness and tingling in 3 of five fingers on left hand since cervical spine surgery Some with restriction with extension, flexion and turning side to side with head/neck since cervical spine surgery

## 2023-04-20 NOTE — H&P PST ADULT - HISTORY OF PRESENT ILLNESS
Mr. Perez is a 72 year old man with PMH former smoker, RBBB, cervical radiculopathy s/p fusion and BPH presenting with hematuria and on 4/18 urine cx positive on antibiotic and now scheduled for cystoscopy and internal urethrotomy on 4/25/2023. Mr. Perez is a 72 year old man with PMH former smoker, RBBB, hypothyroid, [place] uses bilateral hearing aids cervical radiculopathy s/p fusion and BPH presenting with hematuria and on 4/18 urine cx positive on antibiotic and now scheduled for cystoscopy and internal urethrotomy on 4/25/2023.

## 2023-04-21 LAB — BACTERIA UR CULT: ABNORMAL

## 2023-04-21 RX ORDER — SULFAMETHOXAZOLE AND TRIMETHOPRIM 800; 160 MG/1; MG/1
800-160 TABLET ORAL
Qty: 10 | Refills: 0 | Status: DISCONTINUED | COMMUNITY
Start: 2023-04-18 | End: 2023-04-21

## 2023-04-24 ENCOUNTER — TRANSCRIPTION ENCOUNTER (OUTPATIENT)
Age: 73
End: 2023-04-24

## 2023-04-25 ENCOUNTER — TRANSCRIPTION ENCOUNTER (OUTPATIENT)
Age: 73
End: 2023-04-25

## 2023-04-25 ENCOUNTER — APPOINTMENT (OUTPATIENT)
Dept: UROLOGY | Facility: HOSPITAL | Age: 73
End: 2023-04-25

## 2023-04-25 ENCOUNTER — OUTPATIENT (OUTPATIENT)
Dept: OUTPATIENT SERVICES | Facility: HOSPITAL | Age: 73
LOS: 1 days | End: 2023-04-25
Payer: MEDICARE

## 2023-04-25 ENCOUNTER — RESULT REVIEW (OUTPATIENT)
Age: 73
End: 2023-04-25

## 2023-04-25 VITALS
HEART RATE: 74 BPM | TEMPERATURE: 97 F | OXYGEN SATURATION: 97 % | DIASTOLIC BLOOD PRESSURE: 58 MMHG | RESPIRATION RATE: 15 BRPM | SYSTOLIC BLOOD PRESSURE: 114 MMHG

## 2023-04-25 VITALS
OXYGEN SATURATION: 97 % | HEIGHT: 68 IN | RESPIRATION RATE: 16 BRPM | WEIGHT: 220.02 LBS | HEART RATE: 68 BPM | DIASTOLIC BLOOD PRESSURE: 60 MMHG | TEMPERATURE: 97 F | SYSTOLIC BLOOD PRESSURE: 133 MMHG

## 2023-04-25 DIAGNOSIS — Z90.49 ACQUIRED ABSENCE OF OTHER SPECIFIED PARTS OF DIGESTIVE TRACT: Chronic | ICD-10-CM

## 2023-04-25 DIAGNOSIS — R31.0 GROSS HEMATURIA: ICD-10-CM

## 2023-04-25 DIAGNOSIS — Z98.890 OTHER SPECIFIED POSTPROCEDURAL STATES: Chronic | ICD-10-CM

## 2023-04-25 PROCEDURE — 52276 CYSTOSCOPY AND TREATMENT: CPT | Mod: XS

## 2023-04-25 PROCEDURE — 52234 CYSTOSCOPY AND TREATMENT: CPT

## 2023-04-25 PROCEDURE — 52276 CYSTOSCOPY AND TREATMENT: CPT | Mod: 59

## 2023-04-25 PROCEDURE — C9399: CPT

## 2023-04-25 PROCEDURE — C1758: CPT

## 2023-04-25 PROCEDURE — C1769: CPT

## 2023-04-25 DEVICE — GUIDEWIRE SENSOR DUAL-FLEX NITINOL STRAIGHT .038" X 150CM: Type: IMPLANTABLE DEVICE | Status: FUNCTIONAL

## 2023-04-25 DEVICE — IMPLANTABLE DEVICE: Type: IMPLANTABLE DEVICE | Status: FUNCTIONAL

## 2023-04-25 DEVICE — URETERAL CATH OPEN END 5FR 70CM: Type: IMPLANTABLE DEVICE | Status: FUNCTIONAL

## 2023-04-25 RX ORDER — LIDOCAINE HCL 20 MG/ML
0.2 VIAL (ML) INJECTION ONCE
Refills: 0 | Status: COMPLETED | OUTPATIENT
Start: 2023-04-25 | End: 2023-04-25

## 2023-04-25 RX ORDER — CEFAZOLIN SODIUM 1 G
2000 VIAL (EA) INJECTION ONCE
Refills: 0 | Status: COMPLETED | OUTPATIENT
Start: 2023-04-25 | End: 2023-04-25

## 2023-04-25 RX ADMIN — SODIUM CHLORIDE 3 MILLILITER(S): 9 INJECTION INTRAMUSCULAR; INTRAVENOUS; SUBCUTANEOUS at 12:54

## 2023-04-25 RX ADMIN — SODIUM CHLORIDE 100 MILLILITER(S): 9 INJECTION, SOLUTION INTRAVENOUS at 13:14

## 2023-04-25 NOTE — BRIEF OPERATIVE NOTE - COMMENTS
18F [place] tip Hawk placed over wire.   ~1.5 cm bladder tumor on left anterior dome of bladder was resected and fulgurated; small bladder diverticulum just below bladder tumor.

## 2023-04-25 NOTE — ASU DISCHARGE PLAN (ADULT/PEDIATRIC) - NS MD DC FALL RISK RISK
For information on Fall & Injury Prevention, visit: [email or web address] OR  [email or web address] OR  https://www.cdc.gov/steadi/patient.html

## 2023-04-25 NOTE — BRIEF OPERATIVE NOTE - NSICDXBRIEFPROCEDURE_GEN_ALL_CORE_FT
PROCEDURES:  DVIU (direct vision internal urethrotomy) 25-Apr-2023 15:59:29  Celia Suazo  Cystoscopy with fulguration and resection of bladder tumor 25-Apr-2023 16:00:12  Celia Suazo

## 2023-04-25 NOTE — ASU DISCHARGE PLAN (ADULT/PEDIATRIC) - ASU DC SPECIAL INSTRUCTIONSFT
CATHETER: Some patients are sent home with a esteban catheter, while others go home urinating on their own. If you still have a catheter, the nurses will review instructions and care before you go home.   GENERAL: It is common to have blood in your urine after your procedure. It may be pink or even red; inform your doctor if you have a significant amount of clot in the urine or if your catheter stops draining. The urine may clear and then become bloody again especially as you are more physically active. It is not uncommon to have some burning when you urinate, this will gradually improve. With a catheter in place, it is not uncommon to have occasional leakage or urine or blood around the catheter. Please call your urologist if this is excessive and/or the urine is not draining through the catheter into the bag.  BATHING: You may shower.  DIET: You may resume your regular diet and regular medication regimen.  PAIN: You may take Tylenol (acetaminophen) 650-975mg and/or Motrin (ibuprofen) 400-600mg, both available over the counter, for pain every 6 hours as needed. Do not exceed 4000mg of Tylenol (acetaminophen) daily. You may alternate these medications such that you take one or the other every 3 hours for around the clock pain coverage.  ANTIBIOTICS: Complete the ciprofloxacin antibiotic you started.  STOOL SOFTENERS: Do not allow yourself to become constipated as straining may cause bleeding. Take stool softeners or a laxative (ex. Miralax, Colace, Senokot, ExLax, etc), available over the counter, if needed.  ACTIVITY: No heavy lifting or strenuous exercise until you are evaluated at your post-operative appointment. Otherwise, you may return to your usual level of physical activity.  FOLLOW-UP: If you did not already schedule your post-operative appointment, please call your urologist to schedule a follow-up appointment for Thursday, 4/27/2023.  CALL YOUR UROLOGIST IF: You have any bleeding that does not stop, inability to void >8 hours, fever over 100.4 F, chills, persistent nausea/vomiting, changes in your incision concerning for infection, or if your pain is not controlled on your discharge pain medications.

## 2023-04-25 NOTE — ASU PATIENT PROFILE, ADULT - NSICDXPASTMEDICALHX_GEN_ALL_CORE_FT
PAST MEDICAL HISTORY:  BPH (benign prostatic hyperplasia) s/p UroLift    Cervical osteophyte     Cervical radiculopathy     Cervical spinal stenosis     Enterococcus UTI 8/26/2021 treated with ABx--resolved, symptom free now    Epiploic appendagitis ER visit 8/26/2021    [place] (hard of hearing)     Hypothyroid     RBBB

## 2023-04-25 NOTE — PRE-ANESTHESIA EVALUATION ADULT - NS MD HP INPLANTS MED DEV
Pt will meet at least 75% of the estimated nutritional needs during hospitalization. cervical spine; wires from prostate surgeryn

## 2023-04-25 NOTE — ASU PATIENT PROFILE, ADULT - NS TRANSFER DENTURES
2nd attempt to call regarding COVID-19 Virtual Care Program. Left voicemail with [phone] number to return call. Will not attempt call again.    If patient returns this call, please do not send a message to the person that called them. Please follow Knowledgebase Workflows. Please document the outcome and reason for call.     Upper/Lower

## 2023-04-25 NOTE — ASU PATIENT PROFILE, ADULT - TEACHING/LEARNING CULTURAL CONSIDERATIONS
Physical Therapy  Visit Type: treatment  Co-treat with: Occupational therapist  Precautions:  Medical precautions:  fall risk; standard precautions.      68 year old male who was admitted on 9/3/2021 for acute renal failure, dehydration, hyperkalemia, hypotension, transaminitis. Lives in apartment with roommate with many steps to enter. He has recently requiring assistance for all mobility and ADLs due to dizziness with mobility. Patient reports history of multiple falls over the past week due to dizziness.    Lines:     Basic: capped IV and continuous pulse oximetry (BP cuff)      Lines in chart and on patient reviewed, precautions maintained throughout session.    SUBJECTIVE  Patient agreed to participate in therapy this date.  Patient supine in bed and agreeable to therapy session.   attempted throughout session, however unable to reach .  Short therapy session for up to chair only due to lack of  available.  Patient / Family Goal: return to previous functional status  Patient reports no pain this session     OBJECTIVE   Level of consciousness: alert      Arousal alertness: appropriate responses to stimuli    Affect/Behavior: cooperative and pleasant  Patient activity tolerance: 1 to 1 activity to rest    Bed Mobility:        Supine to sit: minimal assist  Training completed:    Tasks: supine to sit    Education details: patient safety, body mechanics and patient requires additional training    Minimal assistance with bed mobility this session with cues for sequencing and assistance for handhold for pull to sit.  Transfers:    Assistive devices: 2-wheeled walker, 1 person and gait belt    Sit to stand: minimal assist    Stand to sit: minimal assist    Stand pivot: minimal assist  Training completed:    Tasks: sit to stand, stand to sit and stand pivot    Education details: patient safety, body mechanics and patient requires additional training    Minimal assistance for transfers  with cues for sequencing and assistance for lift and controlled lower.  Patient able to pivot to recliner chair with minimal assistance for safety.  Gait/Ambulation:    Training Completed:      Patient able to take a few steps to recliner chair, further mobility not assessed due to lack of  availability.      Interventions     Training provided: bed mobility training, transfer training and safety training    Skilled input: Verbal instruction/cues and tactile instruction/cues  Verbal Consent: Writer verbally educated and received verbal consent for hand placement, positioning of patient, and techniques to be performed today from patient for clothing adjustments for techniques, hand placement and palpation for techniques and therapist position for techniques as described above and how they are pertinent to the patient's plan of care.       ASSESSMENT    Impairments: strength, activity tolerance, balance deficits, safety awareness and endurance  Functional Limitations: all functional mobility    Patient seen on  nursing unit. Today's treatment focused on pivot transfer to recliner chair.  Session limited by lack of availability of  this date.  Patient felt comfortable with pivot to recliner chair and tolerated well requiring minimal assistance for safety.  PT will follow.  The patient is demonstrating fair progress as evidenced by improving stability with transfers.      For safe return to prior living situation the patient needs to be modified independent  for overall mobility. Patient currently lacks assistance to return to their previous living situation.      Discharge Recommendations  PT Identified Barriers to Discharge: stairs, weakness, balance deficits, decreased activity tolerance   Recommendation for Discharge: PT WI: Post acute therapy             PT/OT Mobility Equipment for Discharge: patient owns a cane and 2ww (personal cane in the room)   PT/OT ADL Equipment for Discharge:  Continue to assess          Visit#: 3    Skilled therapy is required to address these limitations in attempt to maximize the patient's independence.  Progress: slow progress, decreased activity tolerance    End of Session:   Location: in chair  Safety measures: call light within reach, lines intact, equipment intact and alarm system in place/re-engaged  Handoff to: nurse    PLAN    Suggestions for next session as indicated: Plan: Bring aide and wheelchair follow for longer distance ambulation    PT Frequency: 5 days/week  Frequency Comments: last seen 9/9, EB/ML/KD      Agreement to plan and goals: patient agrees with goals and treatment plan        GOALS  Review Date: 9/12/2021  Long Term Goals: (to be met by time of discharge from hospital)  Sit to supine: Patient will complete sit to supine modified independent.  Supine to sit: Patient will complete supine to sit modified independent.  Sit to stand: Patient will complete sit to stand transfer with modified independent.   Stand to sit: Patient will complete stand to sit transfer with modified independent.   Ambulation (even): Patient will ambulate on even surface for 150 feet with modified independent.   Flight of stairs: Patient will ambulate a flight of stairs with modified independent.     Documented in the chart in the following areas: Assessment.      Therapy procedure time and total treatment time can be found documented on the Time Entry flowsheet    Saira Soriano DPT  Rehab Services: [phone]     none

## 2023-04-25 NOTE — ASU DISCHARGE PLAN (ADULT/PEDIATRIC) - CARE PROVIDER_API CALL
Pedro Pablo Purvis)  Urology  [address], [address]  [place] [address]  Phone: [phone]  Fax: [phone]  Established Patient  Follow Up Time:

## 2023-04-25 NOTE — BRIEF OPERATIVE NOTE - OPERATION/FINDINGS
Urethral stricture just distal to verumontanum. 1.5 cm bladder tumor on left anterior dome of bladder; small bladder diverticulum just below bladder tumor.

## 2023-04-25 NOTE — BRIEF OPERATIVE NOTE - NSICDXBRIEFPOSTOP_GEN_ALL_CORE_FT
POST-OP DIAGNOSIS:  Urethral stricture in male 25-Apr-2023 16:00:43  Celia Suazo  Bladder tumor 25-Apr-2023 16:00:56  Celia Suazo

## 2023-04-26 PROBLEM — I45.10 UNSPECIFIED RIGHT BUNDLE-BRANCH BLOCK: Chronic | Status: ACTIVE | Noted: 2023-04-20

## 2023-04-26 PROBLEM — E03.9 HYPOTHYROIDISM, UNSPECIFIED: Chronic | Status: ACTIVE | Noted: 2023-04-20

## 2023-04-27 ENCOUNTER — APPOINTMENT (OUTPATIENT)
Dept: UROLOGY | Facility: CLINIC | Age: 73
End: 2023-04-27
Payer: MEDICARE

## 2023-04-27 DIAGNOSIS — N52.9 MALE ERECTILE DYSFUNCTION, UNSPECIFIED: ICD-10-CM

## 2023-04-27 PROCEDURE — 99214 OFFICE O/P EST MOD 30 MIN: CPT

## 2023-04-27 RX ORDER — CIPROFLOXACIN HYDROCHLORIDE 500 MG/1
500 TABLET, FILM COATED ORAL TWICE DAILY
Qty: 10 | Refills: 0 | Status: DISCONTINUED | COMMUNITY
Start: 2023-04-21 | End: 2023-04-27

## 2023-05-02 LAB — SURGICAL PATHOLOGY STUDY: SIGNIFICANT CHANGE UP

## 2023-05-08 ENCOUNTER — NON-APPOINTMENT (OUTPATIENT)
Age: 73
End: 2023-05-08

## 2023-05-10 ENCOUNTER — NON-APPOINTMENT (OUTPATIENT)
Age: 73
End: 2023-05-10

## 2023-05-11 NOTE — PHYSICAL EXAM
[General Appearance - Well Developed] : well developed [General Appearance - Well Nourished] : well nourished [Normal Appearance] : normal appearance [Well Groomed] : well groomed [General Appearance - In No Acute Distress] : no acute distress [Abdomen Soft] : soft [Abdomen Tenderness] : non-tender [Costovertebral Angle Tenderness] : no ~M costovertebral angle tenderness [Urethral Meatus] : meatus normal [Urinary Bladder Findings] : the bladder was normal on palpation [Testes Mass (___cm)] : there were no testicular masses [Scrotum] : the scrotum was normal [Prostate Tenderness] : the prostate was not tender [No Prostate Nodules] : no prostate nodules [Prostate Enlarged] : was enlarged [] : no respiratory distress [Respiration, Rhythm And Depth] : normal respiratory rhythm and effort [Exaggerated Use Of Accessory Muscles For Inspiration] : no accessory muscle use [Oriented To Time, Place, And Person] : oriented to person, place, and time [Affect] : the affect was normal [Mood] : the mood was normal [Not Anxious] : not anxious [FreeTextEntry1] : Hawk with clear urine, erythema of the glans penis, uncircumcised.  COLTON done previously

## 2023-05-11 NOTE — ASSESSMENT
[FreeTextEntry1] : Hawk catheter was removed for trial of void.  He will use triamcinolone and nystatin twice a day for 7 days for balanitis.  PSA level was normal.  Pathology report was reviewed. CT urogram will be ordered. Grading system vs tumor depth was reviewed. Pathology was high grade and invasive into lamina propria. However, muscle was not present. therefore recommend returning to OR in a few weeks for repeat biopsy vs TURBT. Possibility of bladder perforation or not obtaining muscle again in specimen was reviewed.

## 2023-05-11 NOTE — HISTORY OF PRESENT ILLNESS
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  On April 25, 2023, he underwent incision of urethral stricture and incidental finding of a bladder tumor which was resected.  He is here today to remove the Hawk catheter.  Urine is dark yellow.  He is on antibiotics.  He also has developed balanitis and he is being prescribed nystatin and triamcinolone.  Urine cytology was negative.  Urine culture showed E. coli in April 2023.  PSA in December 2022 was 0.74. 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 \par \par Office cystoscopy could not be completed due to presence of urethral strictures.  Residual urine volume was less than 100 mL.  He has used sildenafil 60 to 100 mg in the past for erectile dysfunction.  He has received many courses of antibiotics for persistent bacteriuria.  He had urinary tract infections prior to UroLift procedure.\par \par Previous notes: In 2018, he underwent Urolift procedure which initially helped with urinary flow. Now again his urinary flow is slow and he has noticed recurrence of urinary odor. In 2017, PSA level was 1.9. Creatinine was 1.3. Multiple urine cultures previously showed Escherichia coli. Also he has mild erectile dysfunction.

## 2023-05-12 ENCOUNTER — OUTPATIENT (OUTPATIENT)
Dept: OUTPATIENT SERVICES | Facility: HOSPITAL | Age: 73
LOS: 1 days | End: 2023-05-12
Payer: MEDICARE

## 2023-05-12 ENCOUNTER — NON-APPOINTMENT (OUTPATIENT)
Age: 73
End: 2023-05-12

## 2023-05-12 ENCOUNTER — RESULT REVIEW (OUTPATIENT)
Age: 73
End: 2023-05-12

## 2023-05-12 ENCOUNTER — APPOINTMENT (OUTPATIENT)
Dept: CT IMAGING | Facility: CLINIC | Age: 73
End: 2023-05-12
Payer: MEDICARE

## 2023-05-12 ENCOUNTER — APPOINTMENT (OUTPATIENT)
Dept: PHARMACY | Facility: CLINIC | Age: 73
End: 2023-05-12
Payer: SELF-PAY

## 2023-05-12 DIAGNOSIS — Z98.890 OTHER SPECIFIED POSTPROCEDURAL STATES: Chronic | ICD-10-CM

## 2023-05-12 DIAGNOSIS — Z90.49 ACQUIRED ABSENCE OF OTHER SPECIFIED PARTS OF DIGESTIVE TRACT: Chronic | ICD-10-CM

## 2023-05-12 DIAGNOSIS — C67.9 MALIGNANT NEOPLASM OF BLADDER, UNSPECIFIED: ICD-10-CM

## 2023-05-12 LAB
APPEARANCE: ABNORMAL
BACTERIA: ABNORMAL /HPF
BILIRUBIN URINE: NEGATIVE
BLOOD URINE: ABNORMAL
CAST: 1 /LPF
COLOR: ABNORMAL
EPITHELIAL CELLS: 3 /HPF
GLUCOSE QUALITATIVE U: NEGATIVE MG/DL
KETONES URINE: NEGATIVE MG/DL
LEUKOCYTE ESTERASE URINE: ABNORMAL
MICROSCOPIC-UA: NORMAL
NITRITE URINE: POSITIVE
PH URINE: 5.5
PROTEIN URINE: 100 MG/DL
RED BLOOD CELLS URINE: 232 /HPF
REVIEW: NORMAL
SPECIFIC GRAVITY URINE: 1.02
UROBILINOGEN URINE: 1 MG/DL
WHITE BLOOD CELLS URINE: 434 /HPF

## 2023-05-12 PROCEDURE — 74178 CT ABD&PLV WO CNTR FLWD CNTR: CPT

## 2023-05-12 PROCEDURE — V5299A: CUSTOM | Mod: NC

## 2023-05-12 PROCEDURE — V5265A: CUSTOM

## 2023-05-12 PROCEDURE — 74178 CT ABD&PLV WO CNTR FLWD CNTR: CPT | Mod: 26,MH

## 2023-05-12 PROCEDURE — V5267D: CUSTOM

## 2023-05-14 ENCOUNTER — NON-APPOINTMENT (OUTPATIENT)
Age: 73
End: 2023-05-14

## 2023-05-14 ENCOUNTER — EMERGENCY (EMERGENCY)
Facility: HOSPITAL | Age: 73
LOS: 1 days | Discharge: HOME | End: 2023-05-14
Attending: STUDENT IN AN ORGANIZED HEALTH CARE EDUCATION/TRAINING PROGRAM
Payer: MEDICARE

## 2023-05-14 VITALS
HEIGHT: 68 IN | RESPIRATION RATE: 18 BRPM | HEART RATE: 90 BPM | TEMPERATURE: 98 F | DIASTOLIC BLOOD PRESSURE: 68 MMHG | SYSTOLIC BLOOD PRESSURE: 104 MMHG | OXYGEN SATURATION: 98 %

## 2023-05-14 VITALS
TEMPERATURE: 98 F | OXYGEN SATURATION: 97 % | RESPIRATION RATE: 18 BRPM | HEART RATE: 69 BPM | SYSTOLIC BLOOD PRESSURE: 128 MMHG | DIASTOLIC BLOOD PRESSURE: 75 MMHG

## 2023-05-14 DIAGNOSIS — Z90.49 ACQUIRED ABSENCE OF OTHER SPECIFIED PARTS OF DIGESTIVE TRACT: Chronic | ICD-10-CM

## 2023-05-14 DIAGNOSIS — Z98.890 OTHER SPECIFIED POSTPROCEDURAL STATES: Chronic | ICD-10-CM

## 2023-05-14 LAB
APPEARANCE UR: ABNORMAL
BACTERIA # UR AUTO: ABNORMAL
BILIRUB UR-MCNC: NEGATIVE — SIGNIFICANT CHANGE UP
COLOR SPEC: YELLOW — SIGNIFICANT CHANGE UP
DIFF PNL FLD: ABNORMAL
EPI CELLS # UR: SIGNIFICANT CHANGE UP
GLUCOSE UR QL: NEGATIVE — SIGNIFICANT CHANGE UP
KETONES UR-MCNC: NEGATIVE — SIGNIFICANT CHANGE UP
LEUKOCYTE ESTERASE UR-ACNC: ABNORMAL
NITRITE UR-MCNC: POSITIVE
PH UR: 7 — SIGNIFICANT CHANGE UP (ref 5–8)
PROT UR-MCNC: NEGATIVE — SIGNIFICANT CHANGE UP
RBC CASTS # UR COMP ASSIST: >50 /HPF (ref 0–4)
SP GR SPEC: 1.01 — SIGNIFICANT CHANGE UP (ref 1.01–1.02)
UROBILINOGEN FLD QL: NEGATIVE — SIGNIFICANT CHANGE UP
WBC UR QL: ABNORMAL /HPF (ref 0–5)

## 2023-05-14 PROCEDURE — 99283 EMERGENCY DEPT VISIT LOW MDM: CPT

## 2023-05-14 PROCEDURE — 99284 EMERGENCY DEPT VISIT MOD MDM: CPT

## 2023-05-14 PROCEDURE — 87086 URINE CULTURE/COLONY COUNT: CPT

## 2023-05-14 PROCEDURE — 81001 URINALYSIS AUTO W/SCOPE: CPT

## 2023-05-14 PROCEDURE — 51702 INSERT TEMP BLADDER CATH: CPT

## 2023-05-14 RX ORDER — LIDOCAINE HCL 20 MG/ML
10 VIAL (ML) INJECTION ONCE
Refills: 0 | Status: COMPLETED | OUTPATIENT
Start: 2023-05-14 | End: 2023-05-14

## 2023-05-14 RX ORDER — CEPHALEXIN 500 MG
500 CAPSULE ORAL ONCE
Refills: 0 | Status: DISCONTINUED | OUTPATIENT
Start: 2023-05-14 | End: 2023-05-14

## 2023-05-14 RX ORDER — CEPHALEXIN 500 MG
1 CAPSULE ORAL
Qty: 14 | Refills: 0
Start: 2023-05-14 | End: 2023-05-20

## 2023-05-14 RX ORDER — CEPHALEXIN 500 MG
500 CAPSULE ORAL ONCE
Refills: 0 | Status: COMPLETED | OUTPATIENT
Start: 2023-05-14 | End: 2023-05-14

## 2023-05-14 RX ORDER — CEPHALEXIN 500 MG
1 CAPSULE ORAL
Qty: 21 | Refills: 0
Start: 2023-05-14 | End: 2023-05-20

## 2023-05-14 RX ADMIN — Medication 500 MILLIGRAM(S): at 23:49

## 2023-05-14 RX ADMIN — Medication 10 MILLILITER(S): at 21:23

## 2023-05-14 NOTE — ED PROVIDER NOTE - PHYSICAL EXAMINATION
Gen: Well appearing in NAD  Head: NC/AT  Neck: trachea midline  Card: regular rate and rhythm  Resp:  CTAB  Abd: soft, suprapubic discomfort with palpation  Ext: no deformities above reported baseline  Neuro:  A&O, no motor or sensory deficits above reported baseline  Skin:  Warm and dry as visualized  Psych:  Normal affect and mood

## 2023-05-14 NOTE — ED PROVIDER NOTE - CARE PLAN
1 Principal Discharge DX:	Acute UTI   Principal Discharge DX:	Acute UTI  Secondary Diagnosis:	Acute urinary retention

## 2023-05-14 NOTE — ED PROVIDER NOTE - CLINICAL SUMMARY MEDICAL DECISION MAKING FREE TEXT BOX
patient with past medical history of bladder cancer and recent cystoscopy and tumor resection presents to ED unable to void.  Hawk catheter ordered with urinalysis.

## 2023-05-14 NOTE — ED ADULT TRIAGE NOTE - CHIEF COMPLAINT QUOTE
patient with recent cystoscopy on 4/18, reports urinary retention and decreased flow since this morning, with no void since 2pm, had hematuria a couple days ago but resolved, on bactrim, no fever or chills

## 2023-05-14 NOTE — ED PROVIDER NOTE - OBJECTIVE STATEMENT
72-year-old male patient with past medical history of bladder cancer status post cystoscopy and tumor resection on April 25 presents to ED with difficulty urinating.  Patient reports he  had blood in his urine with clots but that resolved over the last couple of days. Since 2 PM he has been completely unable to void.  He has some suprapubic discomfort secondary to his distended bladder.  He denies fever, chills, nausea, vomiting, diarrhea, chest pain, shortness of breath, back pain, numbness, tingling, focal weakness, or any other complaints.

## 2023-05-14 NOTE — ED ADULT NURSE NOTE - OBJECTIVE STATEMENT
patient with recent cystoscopy on 4/18, reports urinary retention and decreased flow since this morning, with no void since 2pm, had hematuria a couple days ago but resolved, on bactrim, no fever or chills.

## 2023-05-14 NOTE — ED PROVIDER NOTE - ATTENDING CONTRIBUTION TO CARE
72y M w/ hx bladder cancer, presents for urinary retention. Says he had cystoscopy and tumor resection on 4/25. Over the past few days has had hematuria with clots. Hematuria has since cleared, but since this afternoon pt has been unable to void. Currently on Bactrim for UTI; however thinks he may have developed a rash from it. No fever or vomiting. On exam, pt well appearing and in no distress. +Suprapubic fullness on palpation. Scattered erythematous macular lesions, no urticaria. Hawk placed with good relief of symptoms. Prior urine cultures reviewed; will switch to Keflex given concern of drug reaction from Bactrim. Pt discharged with Hawk catheter. Advised of need to f/u with urologist.

## 2023-05-14 NOTE — ED PROVIDER NOTE - PATIENT PORTAL LINK FT
You can access the FollowMyHealth Patient Portal offered by [facility] by registering at the following website: [email or web address]. By joining [email or web address] FollowMyHealth portal, you will also be able to view your health information using other applications (apps) compatible with our system.

## 2023-05-14 NOTE — ED ADULT NURSE NOTE - CAS ELECT INFOMATION PROVIDED
DC instructions given to pt and verbalized understanding. Pt remains alert and O x4. NAD noted. Resp E/U. Pt denies ant pain at this time. Metered urobag switched to leg bag/DC instructions

## 2023-05-14 NOTE — ED ADULT NURSE NOTE - NSFALLUNIVINTERV_ED_ALL_ED
Bed/Stretcher in lowest position, wheels locked, appropriate side rails in place/Call bell, personal items and telephone in reach/Instruct patient to call for assistance before getting out of bed/chair/stretcher/Non-slip footwear applied when patient is off stretcher/[place] to call system/Physically safe environment - no spills, clutter or unnecessary equipment/Purposeful proactive rounding/Room/bathroom lighting operational, light cord in reach

## 2023-05-15 ENCOUNTER — TRANSCRIPTION ENCOUNTER (OUTPATIENT)
Age: 73
End: 2023-05-15

## 2023-05-16 ENCOUNTER — TRANSCRIPTION ENCOUNTER (OUTPATIENT)
Age: 73
End: 2023-05-16

## 2023-05-16 LAB
BACTERIA UR CULT: ABNORMAL
CULTURE RESULTS: SIGNIFICANT CHANGE UP
SPECIMEN SOURCE: SIGNIFICANT CHANGE UP

## 2023-05-18 ENCOUNTER — TRANSCRIPTION ENCOUNTER (OUTPATIENT)
Age: 73
End: 2023-05-18

## 2023-05-19 ENCOUNTER — OUTPATIENT (OUTPATIENT)
Dept: OUTPATIENT SERVICES | Facility: HOSPITAL | Age: 73
LOS: 1 days | End: 2023-05-19
Payer: MEDICARE

## 2023-05-19 VITALS
HEART RATE: 74 BPM | DIASTOLIC BLOOD PRESSURE: 83 MMHG | TEMPERATURE: 98 F | WEIGHT: 216.93 LBS | HEIGHT: 68 IN | RESPIRATION RATE: 15 BRPM | OXYGEN SATURATION: 97 % | SYSTOLIC BLOOD PRESSURE: 130 MMHG

## 2023-05-19 DIAGNOSIS — Z90.49 ACQUIRED ABSENCE OF OTHER SPECIFIED PARTS OF DIGESTIVE TRACT: Chronic | ICD-10-CM

## 2023-05-19 DIAGNOSIS — C67.9 MALIGNANT NEOPLASM OF BLADDER, UNSPECIFIED: ICD-10-CM

## 2023-05-19 DIAGNOSIS — Z98.890 OTHER SPECIFIED POSTPROCEDURAL STATES: Chronic | ICD-10-CM

## 2023-05-19 DIAGNOSIS — Z01.818 ENCOUNTER FOR OTHER PREPROCEDURAL EXAMINATION: ICD-10-CM

## 2023-05-19 LAB
ANION GAP SERPL CALC-SCNC: 13 MMOL/L — SIGNIFICANT CHANGE UP (ref 5–17)
BUN SERPL-MCNC: 12 MG/DL — SIGNIFICANT CHANGE UP (ref 7–23)
CALCIUM SERPL-MCNC: 9.4 MG/DL — SIGNIFICANT CHANGE UP (ref 8.4–10.5)
CHLORIDE SERPL-SCNC: 105 MMOL/L — SIGNIFICANT CHANGE UP (ref 96–108)
CO2 SERPL-SCNC: 23 MMOL/L — SIGNIFICANT CHANGE UP (ref 22–31)
CREAT SERPL-MCNC: 1.19 MG/DL — SIGNIFICANT CHANGE UP (ref 0.5–1.3)
EGFR: 64 ML/MIN/1.73M2 — SIGNIFICANT CHANGE UP
GLUCOSE SERPL-MCNC: 100 MG/DL — HIGH (ref 70–99)
HCT VFR BLD CALC: 45.5 % — SIGNIFICANT CHANGE UP (ref 39–50)
HGB BLD-MCNC: 15 G/DL — SIGNIFICANT CHANGE UP (ref 13–17)
MCHC RBC-ENTMCNC: 27.9 PG — SIGNIFICANT CHANGE UP (ref 27–34)
MCHC RBC-ENTMCNC: 33 GM/DL — SIGNIFICANT CHANGE UP (ref 32–36)
MCV RBC AUTO: 84.7 FL — SIGNIFICANT CHANGE UP (ref 80–100)
NRBC # BLD: 0 /100 WBCS — SIGNIFICANT CHANGE UP (ref 0–0)
PLATELET # BLD AUTO: 229 K/UL — SIGNIFICANT CHANGE UP (ref 150–400)
POTASSIUM SERPL-MCNC: 4.2 MMOL/L — SIGNIFICANT CHANGE UP (ref 3.5–5.3)
POTASSIUM SERPL-SCNC: 4.2 MMOL/L — SIGNIFICANT CHANGE UP (ref 3.5–5.3)
RBC # BLD: 5.37 M/UL — SIGNIFICANT CHANGE UP (ref 4.2–5.8)
RBC # FLD: 13.1 % — SIGNIFICANT CHANGE UP (ref 10.3–14.5)
SODIUM SERPL-SCNC: 141 MMOL/L — SIGNIFICANT CHANGE UP (ref 135–145)
WBC # BLD: 8.63 K/UL — SIGNIFICANT CHANGE UP (ref 3.8–10.5)
WBC # FLD AUTO: 8.63 K/UL — SIGNIFICANT CHANGE UP (ref 3.8–10.5)

## 2023-05-19 PROCEDURE — 36415 COLL VENOUS BLD VENIPUNCTURE: CPT

## 2023-05-19 PROCEDURE — 85027 COMPLETE CBC AUTOMATED: CPT

## 2023-05-19 PROCEDURE — 80048 BASIC METABOLIC PNL TOTAL CA: CPT

## 2023-05-19 RX ORDER — CIPROFLOXACIN LACTATE 400MG/40ML
1 VIAL (ML) INTRAVENOUS
Refills: 0 | DISCHARGE

## 2023-05-19 RX ORDER — SODIUM CHLORIDE 9 MG/ML
3 INJECTION INTRAMUSCULAR; INTRAVENOUS; SUBCUTANEOUS EVERY 8 HOURS
Refills: 0 | Status: DISCONTINUED | OUTPATIENT
Start: 2023-05-30 | End: 2023-06-13

## 2023-05-19 RX ORDER — SODIUM CHLORIDE 9 MG/ML
1000 INJECTION, SOLUTION INTRAVENOUS
Refills: 0 | Status: DISCONTINUED | OUTPATIENT
Start: 2023-05-30 | End: 2023-06-13

## 2023-05-19 RX ORDER — ATORVASTATIN CALCIUM 80 MG/1
1 TABLET, FILM COATED ORAL
Refills: 0 | DISCHARGE

## 2023-05-19 RX ORDER — LIDOCAINE HCL 20 MG/ML
0.2 VIAL (ML) INJECTION ONCE
Refills: 0 | Status: DISCONTINUED | OUTPATIENT
Start: 2023-05-30 | End: 2023-06-13

## 2023-05-19 NOTE — H&P PST ADULT - NSICDXPASTMEDICALHX_GEN_ALL_CORE_FT
PAST MEDICAL HISTORY:  Bladder tumor     BPH (benign prostatic hyperplasia) s/p UroLift    Cervical osteophyte     Cervical radiculopathy     Cervical spinal stenosis     Enterococcus UTI 8/26/2021 treated with ABx--resolved, symptom free now    Epiploic appendagitis ER visit 8/26/2021    History of gross hematuria     [place] (hard of hearing)     Hypothyroid     RBBB

## 2023-05-19 NOTE — H&P PST ADULT - HISTORY OF PRESENT ILLNESS
73 y/o M with PMHx former smoker, RBBB, hypothyroid, [place] (b/l hearing aids), cervical radiculopathy s/p fusion 2021, BPH. Hx of recurrent UTI's and developed hematuria since 4/2023. 4/23/23 underwent incision of urethral stricture and incidental finding of a bladder tumor which was resected. Now scheduled for repeat Cystoscopy, transurethral resection of bladder tumor with Bx to collect additional tissue.   ER visit 5/14/23 at [place] 2/2 urinary retention s/p Hawk that was removed 5/18/23.   Patient denies SOB, CP, palpitation, blood in the stool, N/V/D/C, HA, dizziness, seizures. Denies clotting or bleeding disorders. Today, denies dysuria or hematuria. Currently on Keflex x 5 day- Last day 5/21/23.    73 y/o M with PMHx HLD, former smoker, RBBB, hypothyroid, [place] (b/l hearing aids), cervical radiculopathy s/p fusion 2021, BPH. Hx of recurrent UTI's and developed hematuria since 4/2023. 4/23/23 underwent incision of urethral stricture and incidental finding of a bladder tumor which was resected. Now scheduled for Cystoscopy, transurethral resection of bladder tumor with Bx to collect additional tissue.   ER visit 5/14/23 at [place] 2/2 urinary retention s/p Hawk that was removed 5/18/23.   Patient denies SOB, CP, palpitation, blood in the stool, N/V/D/C, HA, dizziness, seizures. Denies clotting or bleeding disorders. Today, denies dysuria or hematuria. Currently on Keflex x 5 day- Last day 5/21/23.

## 2023-05-19 NOTE — H&P PST ADULT - PROBLEM SELECTOR PLAN 1
Procedure: Cystoscopy, transurethral resection of bladder tumor with Bx  PST labs pending- UCx + from 5/15/23- Currently on Abx  AC: None  Medication changes: None  Medical evaluation pending-5/22/23

## 2023-05-19 NOTE — H&P PST ADULT - SUPRACLAVICULAR L
Hemigard Intro: Due to skin fragility and wound tension, it was decided to use HEMIGARD adhesive retention suture devices to permit a linear closure. The skin was cleaned and dried for a 6cm distance away from the wound. Excessive hair, if present, was removed to allow for adhesion. normal

## 2023-05-19 NOTE — H&P PST ADULT - NSICDXPASTSURGICALHX_GEN_ALL_CORE_FT
PAST SURGICAL HISTORY:  H/O cervical discectomy     H/O colonoscopy     H/O transurethral resection of bladder tumor (TURBT)     History of appendectomy lap

## 2023-05-19 NOTE — H&P PST ADULT - NSICDXPROCEDURE_GEN_ALL_CORE_FT
PROCEDURES:  Transurethral resection of bladder tumor (TURBT) with biopsy 19-May-2023 13:55:00  Katie Manuel

## 2023-05-29 ENCOUNTER — TRANSCRIPTION ENCOUNTER (OUTPATIENT)
Age: 73
End: 2023-05-29

## 2023-05-30 ENCOUNTER — RESULT REVIEW (OUTPATIENT)
Age: 73
End: 2023-05-30

## 2023-05-30 ENCOUNTER — APPOINTMENT (OUTPATIENT)
Dept: UROLOGY | Facility: HOSPITAL | Age: 73
End: 2023-05-30

## 2023-05-30 ENCOUNTER — OUTPATIENT (OUTPATIENT)
Dept: OUTPATIENT SERVICES | Facility: HOSPITAL | Age: 73
LOS: 1 days | End: 2023-05-30
Payer: MEDICARE

## 2023-05-30 ENCOUNTER — TRANSCRIPTION ENCOUNTER (OUTPATIENT)
Age: 73
End: 2023-05-30

## 2023-05-30 VITALS
DIASTOLIC BLOOD PRESSURE: 73 MMHG | HEART RATE: 74 BPM | HEIGHT: 68 IN | WEIGHT: 216.93 LBS | TEMPERATURE: 97 F | RESPIRATION RATE: 19 BRPM | SYSTOLIC BLOOD PRESSURE: 137 MMHG

## 2023-05-30 VITALS
SYSTOLIC BLOOD PRESSURE: 123 MMHG | HEART RATE: 57 BPM | OXYGEN SATURATION: 97 % | DIASTOLIC BLOOD PRESSURE: 58 MMHG | RESPIRATION RATE: 15 BRPM | TEMPERATURE: 97 F

## 2023-05-30 DIAGNOSIS — C67.9 MALIGNANT NEOPLASM OF BLADDER, UNSPECIFIED: ICD-10-CM

## 2023-05-30 DIAGNOSIS — Z90.49 ACQUIRED ABSENCE OF OTHER SPECIFIED PARTS OF DIGESTIVE TRACT: Chronic | ICD-10-CM

## 2023-05-30 DIAGNOSIS — Z98.890 OTHER SPECIFIED POSTPROCEDURAL STATES: Chronic | ICD-10-CM

## 2023-05-30 PROCEDURE — 88305 TISSUE EXAM BY PATHOLOGIST: CPT | Mod: 26

## 2023-05-30 PROCEDURE — 52234 CYSTOSCOPY AND TREATMENT: CPT

## 2023-05-30 PROCEDURE — C1769: CPT

## 2023-05-30 PROCEDURE — 88305 TISSUE EXAM BY PATHOLOGIST: CPT

## 2023-05-30 PROCEDURE — C9399: CPT

## 2023-05-30 DEVICE — GUIDEWIRE SENSOR DUAL-FLEX NITINOL STRAIGHT .035" X 150CM
Type: IMPLANTABLE DEVICE | Status: NON-FUNCTIONAL
Removed: 2023-05-30

## 2023-05-30 RX ORDER — CIPROFLOXACIN LACTATE 400MG/40ML
1 VIAL (ML) INTRAVENOUS
Qty: 6 | Refills: 0
Start: 2023-05-30 | End: 2023-06-01

## 2023-05-30 RX ORDER — ONDANSETRON 8 MG/1
4 TABLET, FILM COATED ORAL ONCE
Refills: 0 | Status: DISCONTINUED | OUTPATIENT
Start: 2023-05-30 | End: 2023-06-13

## 2023-05-30 RX ORDER — FENTANYL CITRATE 50 UG/ML
25 INJECTION INTRAVENOUS
Refills: 0 | Status: DISCONTINUED | OUTPATIENT
Start: 2023-05-30 | End: 2023-05-30

## 2023-05-30 RX ORDER — CEFAZOLIN SODIUM 1 G
2000 VIAL (EA) INJECTION ONCE
Refills: 0 | Status: COMPLETED | OUTPATIENT
Start: 2023-05-30 | End: 2023-05-30

## 2023-05-30 RX ORDER — CIPROFLOXACIN LACTATE 400MG/40ML
1 VIAL (ML) INTRAVENOUS
Refills: 0
Start: 2023-05-30

## 2023-05-30 NOTE — ASU DISCHARGE PLAN (ADULT/PEDIATRIC) - NS MD DC FALL RISK RISK
For information on Fall & Injury Prevention, visit: [email or web address] OR  [email or web address] OR  https://www.cdc.gov/steadi/patient.html

## 2023-05-30 NOTE — ASU DISCHARGE PLAN (ADULT/PEDIATRIC) - NURSING INSTRUCTIONS
You received a dose of Tylenol today at 1:50 PM If needed, next dose time is 7:50 PM this evening. Do not exceed 4,000 mg of Tylenol in a 24 hour period.

## 2023-05-30 NOTE — ASU PATIENT PROFILE, ADULT - NSICDXPASTMEDICALHX_GEN_ALL_CORE_FT
PAST MEDICAL HISTORY:  Bladder tumor     BPH (benign prostatic hyperplasia) s/p UroLift    Cervical osteophyte     Cervical radiculopathy     Cervical spinal stenosis     Enterococcus UTI 8/26/2021 treated with ABx--resolved, symptom free now    Epiploic appendagitis ER visit 8/26/2021    History of gross hematuria     [place] (hard of hearing)     Hypothyroid     RBBB

## 2023-05-30 NOTE — ASU DISCHARGE PLAN (ADULT/PEDIATRIC) - ASU DC SPECIAL INSTRUCTIONSFT
Can remove esteban catheter yourself on Friday.    Please follow up with Dr. Purvis as needed.  Call [phone] to schedule/confirm your appointment.  Call or follow up sooner with fevers, chills, nausea, vomiting, increasing pain, or with other concerns.

## 2023-05-30 NOTE — ASU PATIENT PROFILE, ADULT - FALL HARM RISK - UNIVERSAL INTERVENTIONS
Bed in lowest position, wheels locked, appropriate side rails in place/Call bell, personal items and telephone in reach/Instruct patient to call for assistance before getting out of bed or chair/Non-slip footwear when patient is out of bed/[place] to call system/Physically safe environment - no spills, clutter or unnecessary equipment/Purposeful Proactive Rounding/Room/bathroom lighting operational, light cord in reach

## 2023-05-31 RX ORDER — SULFAMETHOXAZOLE AND TRIMETHOPRIM 800; 160 MG/1; MG/1
800-160 TABLET ORAL
Qty: 14 | Refills: 0 | Status: DISCONTINUED | COMMUNITY
Start: 2023-05-12 | End: 2023-05-31

## 2023-06-05 LAB — SURGICAL PATHOLOGY STUDY: SIGNIFICANT CHANGE UP

## 2023-06-08 PROBLEM — Z87.898 PERSONAL HISTORY OF OTHER SPECIFIED CONDITIONS: Chronic | Status: ACTIVE | Noted: 2023-05-19

## 2023-06-08 PROBLEM — D49.4 NEOPLASM OF UNSPECIFIED BEHAVIOR OF BLADDER: Chronic | Status: ACTIVE | Noted: 2023-05-19

## 2023-06-20 ENCOUNTER — APPOINTMENT (OUTPATIENT)
Dept: UROLOGY | Facility: CLINIC | Age: 73
End: 2023-06-20
Payer: MEDICARE

## 2023-06-20 VITALS
BODY MASS INDEX: 33.8 KG/M2 | DIASTOLIC BLOOD PRESSURE: 70 MMHG | WEIGHT: 223 LBS | SYSTOLIC BLOOD PRESSURE: 130 MMHG | HEIGHT: 68 IN | HEART RATE: 66 BPM | RESPIRATION RATE: 16 BRPM

## 2023-06-20 DIAGNOSIS — Z87.898 PERSONAL HISTORY OF OTHER SPECIFIED CONDITIONS: ICD-10-CM

## 2023-06-20 DIAGNOSIS — Z86.03 PERSONAL HISTORY OF NEOPLASM OF UNCERTAIN BEHAVIOR: ICD-10-CM

## 2023-06-20 DIAGNOSIS — Z01.818 ENCOUNTER FOR OTHER PREPROCEDURAL EXAMINATION: ICD-10-CM

## 2023-06-20 DIAGNOSIS — N39.0 URINARY TRACT INFECTION, SITE NOT SPECIFIED: ICD-10-CM

## 2023-06-20 DIAGNOSIS — Z87.438 PERSONAL HISTORY OF OTHER DISEASES OF MALE GENITAL ORGANS: ICD-10-CM

## 2023-06-20 PROCEDURE — 99215 OFFICE O/P EST HI 40 MIN: CPT

## 2023-06-20 RX ORDER — NYSTATIN 100000 [USP'U]/G
100000 CREAM TOPICAL
Qty: 60 | Refills: 0 | Status: COMPLETED | COMMUNITY
Start: 2023-04-25 | End: 2023-06-20

## 2023-06-20 RX ORDER — CLOTRIMAZOLE AND BETAMETHASONE DIPROPIONATE 10; .5 MG/G; MG/G
1-0.05 CREAM TOPICAL TWICE DAILY
Qty: 1 | Refills: 3 | Status: COMPLETED | COMMUNITY
Start: 2023-05-08 | End: 2023-06-20

## 2023-06-20 RX ORDER — CHROMIUM 200 MCG
TABLET ORAL
Refills: 0 | Status: ACTIVE | COMMUNITY

## 2023-06-20 RX ORDER — TRIAMCINOLONE ACETONIDE 1 MG/G
0.1 CREAM TOPICAL TWICE DAILY
Qty: 1 | Refills: 0 | Status: COMPLETED | COMMUNITY
Start: 2023-04-27 | End: 2023-06-20

## 2023-06-20 RX ORDER — CIPROFLOXACIN HYDROCHLORIDE 500 MG/1
500 TABLET, FILM COATED ORAL TWICE DAILY
Qty: 6 | Refills: 0 | Status: COMPLETED | COMMUNITY
Start: 2023-05-31 | End: 2023-06-20

## 2023-06-20 NOTE — ASSESSMENT
[FreeTextEntry1] : I had a long discussion today with the patient and his wife.  We discussed the natural history of high risk nonmuscle invasive bladder cancer.  We discussed the treatment options including intravesical BCG plus maintenance therapy versus early radical cystectomy.  We discussed the follow-up schedule of surveillance cystoscopy following intravesical therapy.  We discussed the risk of tumor recurrence as well as the risk of tumor progression.  His last CT abdomen/pelvis urogram was done 5/12/2023.\par \par The patient would prefer to proceed with intravesical therapy but understands that if he has BCG unresponsive disease, then will likely need radical cystectomy.  We briefly reviewed the options for urinary diversion including orthotopic neobladder and ileal conduit urinary diversion.\par \par We also discussed [employer] 3475–676 clinical trial which randomizes patients to intravesical BCG plus maintenance versus intravesical BCG plus maintenance plus IV pembrolizumab.  The rationale for the clinical trial as well as the potential risks of treatment were reviewed.  A copy of the consent form was given to the patient for his review.  Currently he is interested in the clinical trial.  We will contact him in several days to answer any questions and for final determination if he would like to proceed with screening.\par \par All questions answered.

## 2023-06-20 NOTE — HISTORY OF PRESENT ILLNESS
[FreeTextEntry1] : Patient is referred by Dr. Purvis for evaluation management of recent diagnosis of bladder cancer\par The patient is a retired nurse.  He has a history of BPH status post cystoscopy, UroLift procedure in 2018.  Patient subsequently has had dilation procedure for urethral stricture.\par He has a history of recurrent UTIs, currently is not on any prophylactic medication.\par \par s/p Cystoscopy, incision of urethral stricture and TURBT 4/25/2023. Path: HG TCC, pT1 (nested variant), no muscle in the specimen.\par s/p Cystocopy, re-TURBT 5/30/2023. Path: no residual tumor identified, muscle present and not involved.\par \par Here for treatment discussion.\par \par Currently his urine is clear.  Urine flow is otherwise normal.\par He denies dysuria or incontinence.  No abdominal pain or flank pain.

## 2023-07-07 ENCOUNTER — APPOINTMENT (OUTPATIENT)
Dept: OTOLARYNGOLOGY | Facility: CLINIC | Age: 73
End: 2023-07-07
Payer: MEDICARE

## 2023-07-07 VITALS
WEIGHT: 223 LBS | BODY MASS INDEX: 33.8 KG/M2 | SYSTOLIC BLOOD PRESSURE: 116 MMHG | HEIGHT: 68 IN | HEART RATE: 59 BPM | DIASTOLIC BLOOD PRESSURE: 72 MMHG

## 2023-07-07 DIAGNOSIS — H61.20 IMPACTED CERUMEN, UNSPECIFIED EAR: ICD-10-CM

## 2023-07-07 DIAGNOSIS — H91.90 UNSPECIFIED HEARING LOSS, UNSPECIFIED EAR: ICD-10-CM

## 2023-07-07 PROCEDURE — 69210 REMOVE IMPACTED EAR WAX UNI: CPT

## 2023-07-07 PROCEDURE — 99204 OFFICE O/P NEW MOD 45 MIN: CPT | Mod: 25

## 2023-07-07 NOTE — REVIEW OF SYSTEMS
[Negative] : Heme/Lymph [Hearing Loss] : hearing loss [Patient Intake Form Reviewed] : Patient intake form was reviewed

## 2023-07-07 NOTE — PHYSICAL EXAM
[de-identified] : YULI IMPACTED CERUMEN REMOVED/ HEARING IMPROVED [Normal] : mucosa is normal [Midline] : trachea located in midline position

## 2023-07-10 ENCOUNTER — APPOINTMENT (OUTPATIENT)
Dept: UROLOGY | Facility: CLINIC | Age: 73
End: 2023-07-10
Payer: MEDICARE

## 2023-07-10 PROCEDURE — 99214 OFFICE O/P EST MOD 30 MIN: CPT

## 2023-07-10 RX ORDER — ROSUVASTATIN CALCIUM 5 MG/1
TABLET, FILM COATED ORAL
Refills: 0 | Status: ACTIVE | COMMUNITY

## 2023-07-10 NOTE — ASSESSMENT
[FreeTextEntry1] : H/o Recurrent UTI: recommend to start methenamine/Vit C once daily for UTI prevention. Goals of medication reviewed.  Discussed the potential adverse effects of the medication.  Discussed the proper administration of the medication.\par \par ECOG PS 0\par \par Bladder cancer:\par Again reviewed treatment options including induction BCG + maintenance, cystectomy, intravesical chemotherapy.\par Patient agrees to proceed with enrollment into clinical trial.\par \par [facility] IRB # \par PI: Kin Mccrary MD\par Title: A Phase 3, Randomized, Comparator-controlled Clinical Trial to Study the Efficacy and Safety of Pembrolizumab ([id number]) in Combination with Bacillus Calmette Uriel (BCG) in Participants with High-risk Non-muscle Invasive Bladder Cancer (HR NMIBC) that is either Persistent or Recurrent Following BCG Induction or that is Naive to BCG Treatment (KEYNOTE-676)\par \par Informed consent was obtained prior to any study procedures being performed. \par \par The following was discussed during the consent process:\par The fact that the study involves research\par The study schedule and procedures involved\par The main risks of the study, and the fact that all risks may not be known at this time\par New information that may affect the subject’s willingness to continue on the study will be presented as soon as it is available\par Benefits of participating\par Alternatives to participating\par Confidentiality \par Compensation for research-related injury\par Contacts for questions about the study or their rights while on the study\par The fact that the subject’s participation is voluntary - they can refuse or withdraw \par at any time without penalty or loss of benefits.\par \par The subject was given ample time to ask questions prior to signing - all questions were answered to the subject’s satisfaction.\par \par The subject, investigator, and witness signed the informed consent document(s).  A copy of the signed consent form was given to the subject.\par \par Study screening procedures were initiated.\par \par \par

## 2023-07-10 NOTE — PHYSICAL EXAM
[General Appearance - Well Developed] : well developed [General Appearance - Well Nourished] : well nourished [Normal Appearance] : normal appearance [Well Groomed] : well groomed [General Appearance - In No Acute Distress] : no acute distress [Abdomen Soft] : soft [Abdomen Tenderness] : non-tender [Costovertebral Angle Tenderness] : no ~M costovertebral angle tenderness [Urinary Bladder Findings] : the bladder was normal on palpation [Skin Color & Pigmentation] : normal skin color and pigmentation [Heart Rate And Rhythm] : Heart rate and rhythm were normal [Edema] : no peripheral edema [] : no respiratory distress [Respiration, Rhythm And Depth] : normal respiratory rhythm and effort [Exaggerated Use Of Accessory Muscles For Inspiration] : no accessory muscle use [Normal Station and Gait] : the gait and station were normal for the patient's age [No Focal Deficits] : no focal deficits [No Palpable Adenopathy] : no palpable adenopathy

## 2023-07-10 NOTE — HISTORY OF PRESENT ILLNESS
[FreeTextEntry1] : Was referred by Dr. Purvis for evaluation management of recent diagnosis of bladder cancer\par The patient is a retired nurse.  He has a history of BPH status post cystoscopy, UroLift procedure in 2018.  Patient subsequently has had dilation procedure for urethral stricture.\par He has a history of recurrent UTIs, currently is not on any prophylactic medication.\par \par s/p Cystoscopy, incision of urethral stricture and TURBT 4/25/2023. Path: HG TCC, pT1 (nested variant), no muscle in the specimen.\par s/p Cystocopy, re-TURBT 5/30/2023. Path: no residual tumor identified, muscle present and not involved.\par \par Currently urinary function is stable.\par \par Previously given copy of MERCK [id number] consent. \par \par

## 2023-07-13 LAB — BACTERIA UR CULT: ABNORMAL

## 2023-07-26 ENCOUNTER — OUTPATIENT (OUTPATIENT)
Dept: OUTPATIENT SERVICES | Facility: HOSPITAL | Age: 73
LOS: 1 days | End: 2023-07-26
Payer: SUBSIDIZED

## 2023-07-26 ENCOUNTER — APPOINTMENT (OUTPATIENT)
Dept: CT IMAGING | Facility: IMAGING CENTER | Age: 73
End: 2023-07-26
Payer: SUBSIDIZED

## 2023-07-26 DIAGNOSIS — Z98.890 OTHER SPECIFIED POSTPROCEDURAL STATES: Chronic | ICD-10-CM

## 2023-07-26 DIAGNOSIS — C67.9 MALIGNANT NEOPLASM OF BLADDER, UNSPECIFIED: ICD-10-CM

## 2023-07-26 DIAGNOSIS — Z90.49 ACQUIRED ABSENCE OF OTHER SPECIFIED PARTS OF DIGESTIVE TRACT: Chronic | ICD-10-CM

## 2023-07-26 PROCEDURE — 74178 CT ABD&PLV WO CNTR FLWD CNTR: CPT | Mod: 26

## 2023-07-26 PROCEDURE — 74178 CT ABD&PLV WO CNTR FLWD CNTR: CPT

## 2023-07-26 PROCEDURE — 82565 ASSAY OF CREATININE: CPT

## 2023-08-07 ENCOUNTER — OUTPATIENT (OUTPATIENT)
Dept: OUTPATIENT SERVICES | Facility: HOSPITAL | Age: 73
LOS: 1 days | Discharge: HOME | End: 2023-08-07

## 2023-08-07 DIAGNOSIS — Z90.49 ACQUIRED ABSENCE OF OTHER SPECIFIED PARTS OF DIGESTIVE TRACT: Chronic | ICD-10-CM

## 2023-08-07 DIAGNOSIS — Z98.890 OTHER SPECIFIED POSTPROCEDURAL STATES: Chronic | ICD-10-CM

## 2023-08-07 DIAGNOSIS — C67.9 MALIGNANT NEOPLASM OF BLADDER, UNSPECIFIED: ICD-10-CM

## 2023-08-09 ENCOUNTER — NON-APPOINTMENT (OUTPATIENT)
Age: 73
End: 2023-08-09

## 2023-08-09 ENCOUNTER — RESULT REVIEW (OUTPATIENT)
Age: 73
End: 2023-08-09

## 2023-08-09 ENCOUNTER — APPOINTMENT (OUTPATIENT)
Dept: HEMATOLOGY ONCOLOGY | Facility: CLINIC | Age: 73
End: 2023-08-09
Payer: MEDICARE

## 2023-08-09 VITALS
WEIGHT: 227.05 LBS | RESPIRATION RATE: 16 BRPM | OXYGEN SATURATION: 96 % | SYSTOLIC BLOOD PRESSURE: 109 MMHG | DIASTOLIC BLOOD PRESSURE: 72 MMHG | TEMPERATURE: 96.6 F | HEART RATE: 65 BPM | HEIGHT: 67.44 IN | BODY MASS INDEX: 35.22 KG/M2

## 2023-08-09 LAB
ALBUMIN SERPL ELPH-MCNC: 4.7 G/DL
ALP BLD-CCNC: 74 U/L
ALT SERPL-CCNC: 23 U/L
ANION GAP SERPL CALC-SCNC: 11 MMOL/L
APPEARANCE: ABNORMAL
AST SERPL-CCNC: 23 U/L
BACTERIA: ABNORMAL /HPF
BASOPHILS # BLD AUTO: 0.05 K/UL — SIGNIFICANT CHANGE UP (ref 0–0.2)
BASOPHILS NFR BLD AUTO: 0.7 % — SIGNIFICANT CHANGE UP (ref 0–2)
BILIRUB SERPL-MCNC: 0.3 MG/DL
BILIRUBIN URINE: NEGATIVE
BLOOD URINE: ABNORMAL
BUN SERPL-MCNC: 14 MG/DL
CALCIUM SERPL-MCNC: 9.4 MG/DL
CAST: 4 /LPF
CHLORIDE SERPL-SCNC: 105 MMOL/L
CO2 SERPL-SCNC: 27 MMOL/L
COLOR: YELLOW
CREAT SERPL-MCNC: 1.2 MG/DL
EGFR: 64 ML/MIN/1.73M2
EOSINOPHIL # BLD AUTO: 0.26 K/UL — SIGNIFICANT CHANGE UP (ref 0–0.5)
EOSINOPHIL NFR BLD AUTO: 3.5 % — SIGNIFICANT CHANGE UP (ref 0–6)
EPITHELIAL CELLS: 6 /HPF
GLUCOSE QUALITATIVE U: NEGATIVE MG/DL
GLUCOSE SERPL-MCNC: 112 MG/DL
HBV CORE IGG+IGM SER QL: NONREACTIVE
HBV CORE IGM SER QL: NONREACTIVE
HBV SURFACE AB SER QL: NONREACTIVE
HBV SURFACE AG SER QL: NONREACTIVE
HCT VFR BLD CALC: 42.8 % — SIGNIFICANT CHANGE UP (ref 39–50)
HCV AB SER QL: NONREACTIVE
HCV S/CO RATIO: 0.07 S/CO
HGB BLD-MCNC: 14.1 G/DL — SIGNIFICANT CHANGE UP (ref 13–17)
HIV1+2 AB SPEC QL IA.RAPID: NONREACTIVE
IMM GRANULOCYTES NFR BLD AUTO: 0.1 % — SIGNIFICANT CHANGE UP (ref 0–0.9)
KETONES URINE: ABNORMAL MG/DL
LEUKOCYTE ESTERASE URINE: ABNORMAL
LYMPHOCYTES # BLD AUTO: 1.85 K/UL — SIGNIFICANT CHANGE UP (ref 1–3.3)
LYMPHOCYTES # BLD AUTO: 25 % — SIGNIFICANT CHANGE UP (ref 13–44)
MCHC RBC-ENTMCNC: 28.6 PG — SIGNIFICANT CHANGE UP (ref 27–34)
MCHC RBC-ENTMCNC: 32.9 G/DL — SIGNIFICANT CHANGE UP (ref 32–36)
MCV RBC AUTO: 86.8 FL — SIGNIFICANT CHANGE UP (ref 80–100)
MICROSCOPIC-UA: NORMAL
MONOCYTES # BLD AUTO: 0.6 K/UL — SIGNIFICANT CHANGE UP (ref 0–0.9)
MONOCYTES NFR BLD AUTO: 8.1 % — SIGNIFICANT CHANGE UP (ref 2–14)
NEUTROPHILS # BLD AUTO: 4.62 K/UL — SIGNIFICANT CHANGE UP (ref 1.8–7.4)
NEUTROPHILS NFR BLD AUTO: 62.6 % — SIGNIFICANT CHANGE UP (ref 43–77)
NITRITE URINE: POSITIVE
NRBC # BLD: 0 /100 WBCS — SIGNIFICANT CHANGE UP (ref 0–0)
PH URINE: 5.5
PLATELET # BLD AUTO: 181 K/UL — SIGNIFICANT CHANGE UP (ref 150–400)
POTASSIUM SERPL-SCNC: 4.8 MMOL/L
PROT SERPL-MCNC: 6.6 G/DL
PROTEIN URINE: 100 MG/DL
RBC # BLD: 4.93 M/UL — SIGNIFICANT CHANGE UP (ref 4.2–5.8)
RBC # FLD: 14.5 % — SIGNIFICANT CHANGE UP (ref 10.3–14.5)
RED BLOOD CELLS URINE: 20 /HPF
SODIUM SERPL-SCNC: 143 MMOL/L
SPECIFIC GRAVITY URINE: 1.02
UROBILINOGEN URINE: 0.2 MG/DL
WBC # BLD: 7.39 K/UL — SIGNIFICANT CHANGE UP (ref 3.8–10.5)
WBC # FLD AUTO: 7.39 K/UL — SIGNIFICANT CHANGE UP (ref 3.8–10.5)
WHITE BLOOD CELLS URINE: 530 /HPF

## 2023-08-09 PROCEDURE — 99215 OFFICE O/P EST HI 40 MIN: CPT

## 2023-08-09 RX ORDER — ATORVASTATIN CALCIUM 20 MG/1
20 TABLET, FILM COATED ORAL
Refills: 0 | Status: DISCONTINUED | COMMUNITY
End: 2023-08-09

## 2023-08-09 RX ORDER — CEFDINIR 300 MG/1
300 CAPSULE ORAL
Qty: 14 | Refills: 0 | Status: DISCONTINUED | COMMUNITY
Start: 2023-07-12 | End: 2023-08-09

## 2023-08-10 NOTE — CONSULT LETTER
[Dear  ___] : Dear  [unfilled], [Consult Letter:] : I had the pleasure of evaluating your patient, [unfilled]. [Please see my note below.] : Please see my note below. [Consult Closing:] : Thank you very much for allowing me to participate in the care of this patient.  If you have any questions, please do not hesitate to contact me. [Sincerely,] : Sincerely, [FreeTextEntry3] : Stef Stubbs MD

## 2023-08-10 NOTE — HISTORY OF PRESENT ILLNESS
[Disease: _____________________] : Disease: [unfilled] [T: ___] : T[unfilled] [N: ___] : N[unfilled] [M: ___] : M[unfilled] [AJCC Stage: ____] : AJCC Stage: [unfilled] [de-identified] : Aung Paris is a 72 years old male with history of asymptomatic RBBB, HLD, recurrent UTIs and BPH, and urethral stricture, status post cystoscopy, UroLift procedure in 2018. Patient presented with hematuria in April 2023 and saw Dr. Purvis for evaluation. S/p Cystoscopy, incision of urethral stricture and TURBT 4/25/2023. 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 S/p Cystocopy, re-TURBT 5/30/2023. Path: no residual tumor identified, muscle present and not involved. Dr. Purvis subsequently referred him to Dr. Newell. He was evaluated by Dr. Mccrary and qualified for -294 phase III trial.   7/26/23 CT urogram showed Multiple bladder diverticulum one of which is along the mid left lateral bladder wall demonstrating nodular enhancing thickening similar to prior exam.  Since last TURBT, patient without any hematuria. He reports that he has reduced flow which he thinks is secondary to his strictures. No HA, vision changes, CP, n/v/d/c. Has mild dyspnea on exertion but able tolerate climbing stairs. He reports that appetite is good.   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  [de-identified] : non muscle invasive bladder cancer

## 2023-08-10 NOTE — ASSESSMENT
[FreeTextEntry1] : Aung Paris is a 72 years old male with newly diagnosed high grade pT1 TCC in the bladder, today for evaluation of Keynote 676 trial, Cohort B, BCG naive, BCG induction and reduced maintenance plus one year keytruda vs BCG induction and full maintenance dose plus one year keytruda vs BCG induction and maintenance.   I had a lengthy discussion with the patient and his wife regarding his cancer status and further management. He has newly diagnosed high grade TCC, pT1 in the bladder. A standard of care is BCG induction and maintenance. The keynote 3457-676 phase III study provides a great opportunity for combination of BCG and keytruda vs BCG alone to see if combination does better in complete response rate and EFS. The potential AEs of keytruda are fatigue, skin rash, hypothyroidism, arthritis, rarely hypophysitis, myocarditis, pneumonitis, hepatitis, nephritis, colitis, etc. He understood our whole discussion and was willing to be on the trial and signed the consent if he ends up with keytruda arms. His many questions were answered in fulll to his satisfaction.

## 2023-08-10 NOTE — RESULTS/DATA
[FreeTextEntry1] : ECG at [facility] system Reviewed from 5/22/23 VR 71 BPM; , , QT/QTc 416/452 Normal sinus rhythm, RBBB

## 2023-08-15 ENCOUNTER — NON-APPOINTMENT (OUTPATIENT)
Age: 73
End: 2023-08-15

## 2023-08-15 RX ORDER — CEFPODOXIME PROXETIL 100 MG/1
100 TABLET, FILM COATED ORAL
Qty: 14 | Refills: 0 | Status: COMPLETED | COMMUNITY
Start: 2023-08-15 | End: 2023-08-22

## 2023-08-17 LAB
APPEARANCE: ABNORMAL
BACTERIA: ABNORMAL /HPF
BILIRUBIN URINE: NEGATIVE
BLOOD URINE: ABNORMAL
CAST: 2 /LPF
COLOR: YELLOW
EPITHELIAL CELLS: 4 /HPF
GLUCOSE QUALITATIVE U: NEGATIVE MG/DL
KETONES URINE: ABNORMAL MG/DL
LEUKOCYTE ESTERASE URINE: ABNORMAL
MICROSCOPIC-UA: NORMAL
NITRITE URINE: POSITIVE
PH URINE: 6
PROTEIN URINE: 100 MG/DL
RED BLOOD CELLS URINE: 3 /HPF
SPECIFIC GRAVITY URINE: 1.02
UROBILINOGEN URINE: 0.2 MG/DL
WHITE BLOOD CELLS URINE: 363 /HPF

## 2023-08-19 ENCOUNTER — LABORATORY RESULT (OUTPATIENT)
Age: 73
End: 2023-08-19

## 2023-08-22 ENCOUNTER — NON-APPOINTMENT (OUTPATIENT)
Age: 73
End: 2023-08-22

## 2023-08-22 LAB
APPEARANCE: CLEAR
BACTERIA UR CULT: ABNORMAL
BACTERIA UR CULT: NORMAL
BILIRUBIN URINE: NEGATIVE
BLOOD URINE: ABNORMAL
COLOR: YELLOW
GLUCOSE QUALITATIVE U: NEGATIVE MG/DL
KETONES URINE: NEGATIVE MG/DL
LEUKOCYTE ESTERASE URINE: ABNORMAL
NITRITE URINE: NEGATIVE
PH URINE: 6
PROTEIN URINE: 30 MG/DL
SPECIFIC GRAVITY URINE: 1.02
UROBILINOGEN URINE: 0.2 MG/DL

## 2023-08-23 ENCOUNTER — RESULT REVIEW (OUTPATIENT)
Age: 73
End: 2023-08-23

## 2023-08-23 ENCOUNTER — APPOINTMENT (OUTPATIENT)
Dept: UROLOGY | Facility: CLINIC | Age: 73
End: 2023-08-23
Payer: MEDICARE

## 2023-08-23 ENCOUNTER — OUTPATIENT (OUTPATIENT)
Dept: OUTPATIENT SERVICES | Facility: HOSPITAL | Age: 73
LOS: 1 days | End: 2023-08-23
Payer: MEDICARE

## 2023-08-23 ENCOUNTER — NON-APPOINTMENT (OUTPATIENT)
Age: 73
End: 2023-08-23

## 2023-08-23 ENCOUNTER — APPOINTMENT (OUTPATIENT)
Dept: INFUSION THERAPY | Facility: HOSPITAL | Age: 73
End: 2023-08-23

## 2023-08-23 ENCOUNTER — APPOINTMENT (OUTPATIENT)
Dept: HEMATOLOGY ONCOLOGY | Facility: CLINIC | Age: 73
End: 2023-08-23
Payer: MEDICARE

## 2023-08-23 VITALS — RESPIRATION RATE: 15 BRPM | DIASTOLIC BLOOD PRESSURE: 68 MMHG | SYSTOLIC BLOOD PRESSURE: 123 MMHG | HEART RATE: 56 BPM

## 2023-08-23 VITALS
DIASTOLIC BLOOD PRESSURE: 77 MMHG | WEIGHT: 226.41 LBS | SYSTOLIC BLOOD PRESSURE: 143 MMHG | BODY MASS INDEX: 35 KG/M2 | HEART RATE: 71 BPM | OXYGEN SATURATION: 98 % | RESPIRATION RATE: 16 BRPM | TEMPERATURE: 96.4 F

## 2023-08-23 VITALS
OXYGEN SATURATION: 100 % | HEART RATE: 55 BPM | SYSTOLIC BLOOD PRESSURE: 123 MMHG | TEMPERATURE: 97.5 F | RESPIRATION RATE: 17 BRPM | DIASTOLIC BLOOD PRESSURE: 67 MMHG

## 2023-08-23 DIAGNOSIS — Z00.6 ENCOUNTER FOR EXAMINATION FOR NORMAL COMPARISON AND CONTROL IN CLINICAL RESEARCH PROGRAM: ICD-10-CM

## 2023-08-23 DIAGNOSIS — R35.0 FREQUENCY OF MICTURITION: ICD-10-CM

## 2023-08-23 DIAGNOSIS — Z98.890 OTHER SPECIFIED POSTPROCEDURAL STATES: Chronic | ICD-10-CM

## 2023-08-23 DIAGNOSIS — Z51.11 ENCOUNTER FOR ANTINEOPLASTIC CHEMOTHERAPY: ICD-10-CM

## 2023-08-23 LAB
ALBUMIN SERPL ELPH-MCNC: 4.5 G/DL — SIGNIFICANT CHANGE UP (ref 3.3–5)
ALP SERPL-CCNC: 64 U/L — SIGNIFICANT CHANGE UP (ref 40–120)
ALT FLD-CCNC: 19 U/L — SIGNIFICANT CHANGE UP (ref 10–45)
ANION GAP SERPL CALC-SCNC: 9 MMOL/L — SIGNIFICANT CHANGE UP (ref 5–17)
AST SERPL-CCNC: 30 U/L — SIGNIFICANT CHANGE UP (ref 10–40)
BASOPHILS # BLD AUTO: 0.05 K/UL — SIGNIFICANT CHANGE UP (ref 0–0.2)
BASOPHILS NFR BLD AUTO: 0.7 % — SIGNIFICANT CHANGE UP (ref 0–2)
BILIRUB SERPL-MCNC: 0.3 MG/DL — SIGNIFICANT CHANGE UP (ref 0.2–1.2)
BUN SERPL-MCNC: 15 MG/DL — SIGNIFICANT CHANGE UP (ref 7–23)
CALCIUM SERPL-MCNC: 9.1 MG/DL — SIGNIFICANT CHANGE UP (ref 8.4–10.5)
CHLORIDE SERPL-SCNC: 104 MMOL/L — SIGNIFICANT CHANGE UP (ref 96–108)
CO2 SERPL-SCNC: 28 MMOL/L — SIGNIFICANT CHANGE UP (ref 22–31)
CREAT SERPL-MCNC: 1.08 MG/DL — SIGNIFICANT CHANGE UP (ref 0.5–1.3)
EGFR: 72 ML/MIN/1.73M2 — SIGNIFICANT CHANGE UP
EOSINOPHIL # BLD AUTO: 0.21 K/UL — SIGNIFICANT CHANGE UP (ref 0–0.5)
EOSINOPHIL NFR BLD AUTO: 3 % — SIGNIFICANT CHANGE UP (ref 0–6)
GLUCOSE SERPL-MCNC: 112 MG/DL — HIGH (ref 70–99)
HCT VFR BLD CALC: 40.2 % — SIGNIFICANT CHANGE UP (ref 39–50)
HGB BLD-MCNC: 13.2 G/DL — SIGNIFICANT CHANGE UP (ref 13–17)
IMM GRANULOCYTES NFR BLD AUTO: 0.4 % — SIGNIFICANT CHANGE UP (ref 0–0.9)
LYMPHOCYTES # BLD AUTO: 1.71 K/UL — SIGNIFICANT CHANGE UP (ref 1–3.3)
LYMPHOCYTES # BLD AUTO: 24.3 % — SIGNIFICANT CHANGE UP (ref 13–44)
MCHC RBC-ENTMCNC: 29 PG — SIGNIFICANT CHANGE UP (ref 27–34)
MCHC RBC-ENTMCNC: 32.8 G/DL — SIGNIFICANT CHANGE UP (ref 32–36)
MCV RBC AUTO: 88.4 FL — SIGNIFICANT CHANGE UP (ref 80–100)
MONOCYTES # BLD AUTO: 0.61 K/UL — SIGNIFICANT CHANGE UP (ref 0–0.9)
MONOCYTES NFR BLD AUTO: 8.7 % — SIGNIFICANT CHANGE UP (ref 2–14)
NEUTROPHILS # BLD AUTO: 4.43 K/UL — SIGNIFICANT CHANGE UP (ref 1.8–7.4)
NEUTROPHILS NFR BLD AUTO: 62.9 % — SIGNIFICANT CHANGE UP (ref 43–77)
NRBC # BLD: 0 /100 WBCS — SIGNIFICANT CHANGE UP (ref 0–0)
PLATELET # BLD AUTO: 156 K/UL — SIGNIFICANT CHANGE UP (ref 150–400)
POTASSIUM SERPL-MCNC: 4.8 MMOL/L — SIGNIFICANT CHANGE UP (ref 3.5–5.3)
POTASSIUM SERPL-SCNC: 4.8 MMOL/L — SIGNIFICANT CHANGE UP (ref 3.5–5.3)
PROT SERPL-MCNC: 6.6 G/DL — SIGNIFICANT CHANGE UP (ref 6–8.3)
RBC # BLD: 4.55 M/UL — SIGNIFICANT CHANGE UP (ref 4.2–5.8)
RBC # FLD: 14.1 % — SIGNIFICANT CHANGE UP (ref 10.3–14.5)
SODIUM SERPL-SCNC: 140 MMOL/L — SIGNIFICANT CHANGE UP (ref 135–145)
T4 FREE SERPL-MCNC: 1.3 NG/DL — SIGNIFICANT CHANGE UP (ref 0.9–1.8)
TSH SERPL-MCNC: 1.92 UIU/ML — SIGNIFICANT CHANGE UP (ref 0.27–4.2)
WBC # BLD: 7.04 K/UL — SIGNIFICANT CHANGE UP (ref 3.8–10.5)
WBC # FLD AUTO: 7.04 K/UL — SIGNIFICANT CHANGE UP (ref 3.8–10.5)

## 2023-08-23 PROCEDURE — 51720 TREATMENT OF BLADDER LESION: CPT

## 2023-08-23 PROCEDURE — 99214 OFFICE O/P EST MOD 30 MIN: CPT

## 2023-08-23 RX ORDER — BACILLUS CALMETTE-GUERIN 50 MG/50ML
50 POWDER, FOR SUSPENSION INTRAVESICAL
Qty: 1 | Refills: 0 | Status: COMPLETED | OUTPATIENT
Start: 2023-08-23 | End: 2023-08-23

## 2023-08-23 RX ORDER — LEVOTHYROXINE SODIUM 125 MCG
1 TABLET ORAL
Refills: 0 | DISCHARGE

## 2023-08-24 DIAGNOSIS — C67.9 MALIGNANT NEOPLASM OF BLADDER, UNSPECIFIED: ICD-10-CM

## 2023-08-25 NOTE — HISTORY OF PRESENT ILLNESS
[Disease: _____________________] : Disease: [unfilled] [T: ___] : T[unfilled] [N: ___] : N[unfilled] [M: ___] : M[unfilled] [AJCC Stage: ____] : AJCC Stage: [unfilled] [Date: ____________] : Patient's last distress assessment performed on [unfilled]. [0 - No Distress] : Distress Level: 0 [de-identified] : Aung Paris is a 72 years old male with history of asymptomatic RBBB, HLD, recurrent UTIs and BPH, and urethral stricture, status post cystoscopy, UroLift procedure in 2018. Patient presented with hematuria in April 2023 and saw Dr. Purvis for evaluation. S/p Cystoscopy, incision of urethral stricture and TURBT 4/25/2023. 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 S/p Cystocopy, re-TURBT 5/30/2023. Path: no residual tumor identified, muscle present and not involved. Dr. Purvis subsequently referred him to Dr. Newell. He was evaluated by Dr. Mccrary and qualified for -824 phase III trial.   7/26/23 CT urogram showed Multiple bladder diverticulum one of which is along the mid left lateral bladder wall demonstrating nodular enhancing thickening similar to prior exam.  Since last TURBT, patient without any hematuria. He reports that he has reduced flow which he thinks is secondary to his strictures. No HA, vision changes, CP, n/v/d/c. Has mild dyspnea on exertion but able tolerate climbing stairs. He reports that appetite is good.   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  [de-identified] : non muscle invasive bladder cancer  [de-identified] : 8/23/23: Overall, patient is feeling well, does note some fatigue, however remains active. Had recent urinary tract infection, symptoms have resolved. He denies hematuria, dysuria, urgency and frequency. No fevers. His urine is clear. Does note that he does not feel like he completely emptying his bladder, however this has been ongoing. He will be on keflex, while on study. Pt says he takes tylenol twice a day prophylactically for occ joint pain in his lower pack, shoulder and hip, it is not very severe, thinks he may even be able to do without the tylenol. Patient notes shortness of breath with exertion, however his exercise tolerance is more than one mile when walking. Patient denies fever, chills, headache, vision changes, cough, chest pain, palpitations, abdominal pain, nausea/vomiting, diarrhea, constipation, itching, rashes.

## 2023-08-25 NOTE — ASSESSMENT
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 Patient was randomized to Combo therapy Arm B-2: BCG (full maintenance) and pembrolizumab. Here today, 8/23/23 for first dose of pembrolizumab.    [facility]   - Continue pembrolizumab 400mg q 6 weeks   - Monitor blood work on immunotherapy    - monitor for iRAEs, reviewed with patient today; including but not limited to: fatigue, skin rash, joint pain, hypothyroidism, pneumonitis, hepatitis, nephritis, colitis, myocarditis, pericarditis, hypophysitis. - Continue to follow with Dr. Mccrary / STEVE   - Continue to follow with Research    Instructed to contact our office with any new/worsening symptoms. Patient educated regarding plan of care, all questions/concerns addressed to the best of my abilities and patient's apparent satisfaction. RTC 6 weeks

## 2023-08-25 NOTE — PHYSICAL EXAM
[Fully active, able to carry on all pre-disease performance without restriction] : Status 0 - Fully active, able to carry on all pre-disease performance without restriction [Normal] : affect appropriate [de-identified] : anicteric  [de-identified] : supple, FROM  [de-identified] : no edema

## 2023-08-25 NOTE — REVIEW OF SYSTEMS
[Fatigue] : fatigue [SOB on Exertion] : shortness of breath during exertion [Diarrhea: Grade 0] : Diarrhea: Grade 0 [Joint Pain] : joint pain [Fever] : no fever [Chills] : no chills [Chest Pain] : no chest pain [Palpitations] : no palpitations [Lower Ext Edema] : no lower extremity edema [Shortness Of Breath] : no shortness of breath [Cough] : no cough [Abdominal Pain] : no abdominal pain [Vomiting] : no vomiting [Constipation] : no constipation [Dysuria] : no dysuria [Muscle Pain] : no muscle pain [Skin Rash] : no skin rash [Dizziness] : no dizziness [Difficulty Walking] : no difficulty walking [Easy Bleeding] : no tendency for easy bleeding

## 2023-08-29 ENCOUNTER — APPOINTMENT (OUTPATIENT)
Dept: UROLOGY | Facility: CLINIC | Age: 73
End: 2023-08-29
Payer: MEDICARE

## 2023-08-29 ENCOUNTER — OUTPATIENT (OUTPATIENT)
Dept: OUTPATIENT SERVICES | Facility: HOSPITAL | Age: 73
LOS: 1 days | End: 2023-08-29
Payer: MEDICARE

## 2023-08-29 VITALS
TEMPERATURE: 97.8 F | RESPIRATION RATE: 16 BRPM | OXYGEN SATURATION: 98 % | HEART RATE: 59 BPM | DIASTOLIC BLOOD PRESSURE: 77 MMHG | SYSTOLIC BLOOD PRESSURE: 130 MMHG

## 2023-08-29 VITALS
TEMPERATURE: 99 F | RESPIRATION RATE: 17 BRPM | DIASTOLIC BLOOD PRESSURE: 59 MMHG | HEIGHT: 67.44 IN | BODY MASS INDEX: 35.06 KG/M2 | SYSTOLIC BLOOD PRESSURE: 120 MMHG | HEART RATE: 64 BPM | OXYGEN SATURATION: 98 % | WEIGHT: 226 LBS

## 2023-08-29 DIAGNOSIS — Z98.890 OTHER SPECIFIED POSTPROCEDURAL STATES: Chronic | ICD-10-CM

## 2023-08-29 DIAGNOSIS — C67.9 MALIGNANT NEOPLASM OF BLADDER, UNSPECIFIED: ICD-10-CM

## 2023-08-29 DIAGNOSIS — Z90.49 ACQUIRED ABSENCE OF OTHER SPECIFIED PARTS OF DIGESTIVE TRACT: Chronic | ICD-10-CM

## 2023-08-29 DIAGNOSIS — R35.0 FREQUENCY OF MICTURITION: ICD-10-CM

## 2023-08-29 PROCEDURE — 51720 TREATMENT OF BLADDER LESION: CPT

## 2023-08-29 RX ORDER — BACILLUS CALMETTE-GUERIN 50 MG/50ML
50 POWDER, FOR SUSPENSION INTRAVESICAL
Qty: 1 | Refills: 0 | Status: COMPLETED | OUTPATIENT
Start: 2023-08-29 | End: 2023-08-29

## 2023-09-05 ENCOUNTER — LABORATORY RESULT (OUTPATIENT)
Age: 73
End: 2023-09-05

## 2023-09-05 ENCOUNTER — RESULT REVIEW (OUTPATIENT)
Age: 73
End: 2023-09-05

## 2023-09-05 ENCOUNTER — APPOINTMENT (OUTPATIENT)
Dept: HEMATOLOGY ONCOLOGY | Facility: CLINIC | Age: 73
End: 2023-09-05
Payer: MEDICARE

## 2023-09-05 VITALS
HEART RATE: 62 BPM | OXYGEN SATURATION: 97 % | RESPIRATION RATE: 16 BRPM | SYSTOLIC BLOOD PRESSURE: 118 MMHG | WEIGHT: 226.41 LBS | DIASTOLIC BLOOD PRESSURE: 76 MMHG | TEMPERATURE: 97.2 F | BODY MASS INDEX: 35 KG/M2

## 2023-09-05 DIAGNOSIS — R20.2 ANESTHESIA OF SKIN: ICD-10-CM

## 2023-09-05 DIAGNOSIS — M48.00 SPINAL STENOSIS, SITE UNSPECIFIED: ICD-10-CM

## 2023-09-05 DIAGNOSIS — R20.0 ANESTHESIA OF SKIN: ICD-10-CM

## 2023-09-05 DIAGNOSIS — M54.81 OCCIPITAL NEURALGIA: ICD-10-CM

## 2023-09-05 LAB
BASOPHILS # BLD AUTO: 0.04 K/UL — SIGNIFICANT CHANGE UP (ref 0–0.2)
BASOPHILS NFR BLD AUTO: 0.6 % — SIGNIFICANT CHANGE UP (ref 0–2)
EOSINOPHIL # BLD AUTO: 0.22 K/UL — SIGNIFICANT CHANGE UP (ref 0–0.5)
EOSINOPHIL NFR BLD AUTO: 3.1 % — SIGNIFICANT CHANGE UP (ref 0–6)
HCT VFR BLD CALC: 42.4 % — SIGNIFICANT CHANGE UP (ref 39–50)
HGB BLD-MCNC: 13.8 G/DL — SIGNIFICANT CHANGE UP (ref 13–17)
IMM GRANULOCYTES NFR BLD AUTO: 0.1 % — SIGNIFICANT CHANGE UP (ref 0–0.9)
LYMPHOCYTES # BLD AUTO: 1.9 K/UL — SIGNIFICANT CHANGE UP (ref 1–3.3)
LYMPHOCYTES # BLD AUTO: 26.6 % — SIGNIFICANT CHANGE UP (ref 13–44)
MCHC RBC-ENTMCNC: 28.8 PG — SIGNIFICANT CHANGE UP (ref 27–34)
MCHC RBC-ENTMCNC: 32.5 G/DL — SIGNIFICANT CHANGE UP (ref 32–36)
MCV RBC AUTO: 88.3 FL — SIGNIFICANT CHANGE UP (ref 80–100)
MONOCYTES # BLD AUTO: 0.54 K/UL — SIGNIFICANT CHANGE UP (ref 0–0.9)
MONOCYTES NFR BLD AUTO: 7.6 % — SIGNIFICANT CHANGE UP (ref 2–14)
NEUTROPHILS # BLD AUTO: 4.42 K/UL — SIGNIFICANT CHANGE UP (ref 1.8–7.4)
NEUTROPHILS NFR BLD AUTO: 62 % — SIGNIFICANT CHANGE UP (ref 43–77)
NRBC # BLD: 0 /100 WBCS — SIGNIFICANT CHANGE UP (ref 0–0)
PLATELET # BLD AUTO: 174 K/UL — SIGNIFICANT CHANGE UP (ref 150–400)
RBC # BLD: 4.8 M/UL — SIGNIFICANT CHANGE UP (ref 4.2–5.8)
RBC # FLD: 13.6 % — SIGNIFICANT CHANGE UP (ref 10.3–14.5)
WBC # BLD: 7.13 K/UL — SIGNIFICANT CHANGE UP (ref 3.8–10.5)
WBC # FLD AUTO: 7.13 K/UL — SIGNIFICANT CHANGE UP (ref 3.8–10.5)

## 2023-09-05 PROCEDURE — 99214 OFFICE O/P EST MOD 30 MIN: CPT

## 2023-09-06 ENCOUNTER — NON-APPOINTMENT (OUTPATIENT)
Age: 73
End: 2023-09-06

## 2023-09-06 ENCOUNTER — OUTPATIENT (OUTPATIENT)
Dept: OUTPATIENT SERVICES | Facility: HOSPITAL | Age: 73
LOS: 1 days | End: 2023-09-06
Payer: MEDICARE

## 2023-09-06 ENCOUNTER — APPOINTMENT (OUTPATIENT)
Dept: UROLOGY | Facility: CLINIC | Age: 73
End: 2023-09-06
Payer: MEDICARE

## 2023-09-06 VITALS
DIASTOLIC BLOOD PRESSURE: 72 MMHG | HEART RATE: 65 BPM | TEMPERATURE: 99.1 F | SYSTOLIC BLOOD PRESSURE: 120 MMHG | RESPIRATION RATE: 18 BRPM

## 2023-09-06 VITALS — DIASTOLIC BLOOD PRESSURE: 79 MMHG | HEART RATE: 57 BPM | SYSTOLIC BLOOD PRESSURE: 133 MMHG

## 2023-09-06 DIAGNOSIS — Z90.49 ACQUIRED ABSENCE OF OTHER SPECIFIED PARTS OF DIGESTIVE TRACT: Chronic | ICD-10-CM

## 2023-09-06 DIAGNOSIS — Z98.890 OTHER SPECIFIED POSTPROCEDURAL STATES: Chronic | ICD-10-CM

## 2023-09-06 DIAGNOSIS — R35.0 FREQUENCY OF MICTURITION: ICD-10-CM

## 2023-09-06 PROBLEM — M54.81 CERVICO-OCCIPITAL NEURALGIA OF RIGHT SIDE: Status: ACTIVE | Noted: 2023-09-06

## 2023-09-06 PROBLEM — M48.00 SPINAL STENOSIS: Status: ACTIVE | Noted: 2021-09-02

## 2023-09-06 LAB
ALBUMIN SERPL ELPH-MCNC: 4.6 G/DL
ALP BLD-CCNC: 71 U/L
ALT SERPL-CCNC: 19 U/L
ANION GAP SERPL CALC-SCNC: 12 MMOL/L
AST SERPL-CCNC: 19 U/L
BILIRUB SERPL-MCNC: 0.2 MG/DL
BUN SERPL-MCNC: 19 MG/DL
CALCIUM SERPL-MCNC: 9.2 MG/DL
CHLORIDE SERPL-SCNC: 107 MMOL/L
CO2 SERPL-SCNC: 25 MMOL/L
CREAT SERPL-MCNC: 1.14 MG/DL
EGFR: 68 ML/MIN/1.73M2
GLUCOSE SERPL-MCNC: 100 MG/DL
HSV 1+2 IGG SER IA-IMP: NEGATIVE
HSV 1+2 IGG SER IA-IMP: POSITIVE
HSV1 IGG SER QL: 7.08 INDEX
HSV2 IGG SER QL: 0.05 INDEX
POTASSIUM SERPL-SCNC: 4.7 MMOL/L
PROT SERPL-MCNC: 6.6 G/DL
SODIUM SERPL-SCNC: 144 MMOL/L
VZV AB TITR SER: POSITIVE
VZV IGG SER IF-ACNC: >4000 INDEX

## 2023-09-06 PROCEDURE — 51720 TREATMENT OF BLADDER LESION: CPT

## 2023-09-06 RX ORDER — BACILLUS CALMETTE-GUERIN 50 MG/50ML
50 POWDER, FOR SUSPENSION INTRAVESICAL
Refills: 0 | Status: COMPLETED | COMMUNITY
Start: 2023-08-23

## 2023-09-06 RX ORDER — BACILLUS CALMETTE-GUERIN 50 MG/50ML
50 POWDER, FOR SUSPENSION INTRAVESICAL
Refills: 0 | Status: COMPLETED | COMMUNITY
Start: 2023-08-29

## 2023-09-06 RX ORDER — BACILLUS CALMETTE-GUERIN 50 MG/50ML
50 POWDER, FOR SUSPENSION INTRAVESICAL
Qty: 1 | Refills: 0 | Status: COMPLETED | OUTPATIENT
Start: 2023-09-06 | End: 2023-09-06

## 2023-09-06 NOTE — REVIEW OF SYSTEMS
[Fatigue] : fatigue [SOB on Exertion] : shortness of breath during exertion [Diarrhea: Grade 0] : Diarrhea: Grade 0 [Joint Pain] : joint pain [Fever] : no fever [Chills] : no chills [Eye Pain] : no eye pain [Vision Problems] : no vision problems [Chest Pain] : no chest pain [Palpitations] : no palpitations [Lower Ext Edema] : no lower extremity edema [Shortness Of Breath] : no shortness of breath [Cough] : no cough [Abdominal Pain] : no abdominal pain [Vomiting] : no vomiting [Constipation] : no constipation [Dysuria] : no dysuria [Muscle Pain] : muscle pain [Skin Rash] : no skin rash [Dizziness] : no dizziness [Fainting] : no fainting [Difficulty Walking] : no difficulty walking [Easy Bleeding] : no tendency for easy bleeding

## 2023-09-06 NOTE — ASSESSMENT
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 Patient was randomized to Combo therapy Arm B-2: BCG (full maintenance) and pembrolizumab. Here today, 8/23/23 for first dose of pembrolizumab.    Occipital Neuralgia  - continue gabapentin 300mg TID, discussed that if patient wishes to stop taking this medication it should be tapered off  - continue ibuprofen, may take 400mg q6h PRN, advised to take PPI or H2 blocker while taking ibuprofen for GI ppx  - pt has h/o cervical fusion, ordered C spine XRays today, pt will follow up with NSGY, due to see this month  - viral work up including HSV, VZV, HHV6 and COVID  - low concern that this is clinically related to immunotherapy - continue to monitor and will order further imaging and refer to Neurology as needed   [facility]   - Continue pembrolizumab 400mg q 6 weeks , next 10/4  - Monitor blood work on immunotherapy    - monitor for iRAEs, reviewed with patient today; including but not limited to: fatigue, skin rash, joint pain, hypothyroidism, pneumonitis, hepatitis, nephritis, colitis, myocarditis, pericarditis, hypophysitis. - Continue to follow with Dr. Mccrary / STEVE   - Continue to follow with Research    Instructed to contact our office with any new/worsening symptoms. Patient educated regarding plan of care, all questions/concerns addressed to the best of my abilities and patient's apparent satisfaction. RTC 10/4

## 2023-09-06 NOTE — PHYSICAL EXAM
[Fully active, able to carry on all pre-disease performance without restriction] : Status 0 - Fully active, able to carry on all pre-disease performance without restriction [Normal] : affect appropriate [de-identified] : supple, FROM  [de-identified] : anicteric  [de-identified] : no edema

## 2023-09-06 NOTE — HISTORY OF PRESENT ILLNESS
[Disease: _____________________] : Disease: [unfilled] [T: ___] : T[unfilled] [N: ___] : N[unfilled] [M: ___] : M[unfilled] [AJCC Stage: ____] : AJCC Stage: [unfilled] [Date: ____________] : Patient's last distress assessment performed on [unfilled]. [0 - No Distress] : Distress Level: 0 [de-identified] : Aung Paris is a 72 years old male with history of asymptomatic RBBB, HLD, recurrent UTIs and BPH, and urethral stricture, status post cystoscopy, UroLift procedure in 2018. Patient presented with hematuria in April 2023 and saw Dr. Purvis for evaluation. S/p Cystoscopy, incision of urethral stricture and TURBT 4/25/2023. 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 S/p Cystocopy, re-TURBT 5/30/2023. Path: no residual tumor identified, muscle present and not involved. Dr. Purvis subsequently referred him to Dr. Newell. He was evaluated by Dr. Mccrary and qualified for -736 phase III trial.   7/26/23 CT urogram showed Multiple bladder diverticulum one of which is along the mid left lateral bladder wall demonstrating nodular enhancing thickening similar to prior exam.  Since last TURBT, patient without any hematuria. He reports that he has reduced flow which he thinks is secondary to his strictures. No HA, vision changes, CP, n/v/d/c. Has mild dyspnea on exertion but able tolerate climbing stairs. He reports that appetite is good.   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   8/23/23: Overall, patient is feeling well, does note some fatigue, however remains active. Had recent urinary tract infection, symptoms have resolved. He denies hematuria, dysuria, urgency and frequency. No fevers. His urine is clear. Does note that he does not feel like he completely emptying his bladder, however this has been ongoing. He will be on keflex, while on study. Pt says he takes tylenol twice a day prophylactically for occ joint pain in his lower pack, shoulder and hip, it is not very severe, thinks he may even be able to do without the tylenol. Patient notes shortness of breath with exertion, however his exercise tolerance is more than one mile when walking. Patient denies fever, chills, headache, vision changes, cough, chest pain, palpitations, abdominal pain, nausea/vomiting, diarrhea, constipation, itching, rashes.  [de-identified] : 9/5/23: Patient seen today for urgent visit, he reports 5 days ago that he began having symptoms of shooting pain in the base of his skull, mostly on the R side, occ on the L. The pain is intermittent and lasts for diff erent intervals of time . The pain has woken him up from sleep, it does not seem to be worsened or associated with particular movements. He also reports sensitivity of his scalp, mainly on the R side as well. He denies pain over his eyes and pressure in sinuses. Denies frontal headache, sensitivity to light, visual changes, dizziness and lightheadedness. He denies congestion, cough and shortness of breath. Denies sick contacts. His throat feels "thick",  "like something is stuck". He has not had difficulty swallowing, has been eating normally. No fevers. He does not recall an episode of "trauma" that may have set this pain off. He denies new paresthesia elsewhere, he has ongoing paresthesia in three fingers in L hand. Patient has been taking ibuprofen 200mg approx every 4 hours and started taking gabapentin 300mg TID on Saturday. He has this Rx from a prior neck surgery, the last time he took gabapentin was in 2022. He thinks that this combination of medication is helping to relive his pain, but does not completely alleviate it. Patient reports he recently was vaccinated for shingles. He denies nausea/vomiting, diarrhea/constipation, dysuria.   [de-identified] : non muscle invasive bladder cancer

## 2023-09-08 LAB — VZV IGM SER IF-ACNC: <0.91 INDEX

## 2023-09-11 ENCOUNTER — APPOINTMENT (OUTPATIENT)
Dept: RADIOLOGY | Facility: CLINIC | Age: 73
End: 2023-09-11
Payer: MEDICARE

## 2023-09-11 ENCOUNTER — TRANSCRIPTION ENCOUNTER (OUTPATIENT)
Age: 73
End: 2023-09-11

## 2023-09-11 ENCOUNTER — OUTPATIENT (OUTPATIENT)
Dept: OUTPATIENT SERVICES | Facility: HOSPITAL | Age: 73
LOS: 1 days | End: 2023-09-11
Payer: MEDICARE

## 2023-09-11 DIAGNOSIS — M54.12 RADICULOPATHY, CERVICAL REGION: ICD-10-CM

## 2023-09-11 DIAGNOSIS — Z98.1 ARTHRODESIS STATUS: ICD-10-CM

## 2023-09-11 DIAGNOSIS — Z98.890 OTHER SPECIFIED POSTPROCEDURAL STATES: Chronic | ICD-10-CM

## 2023-09-11 DIAGNOSIS — C67.9 MALIGNANT NEOPLASM OF BLADDER, UNSPECIFIED: ICD-10-CM

## 2023-09-11 DIAGNOSIS — Z90.49 ACQUIRED ABSENCE OF OTHER SPECIFIED PARTS OF DIGESTIVE TRACT: Chronic | ICD-10-CM

## 2023-09-11 PROCEDURE — 72052 X-RAY EXAM NECK SPINE 6/>VWS: CPT

## 2023-09-11 PROCEDURE — 72052 X-RAY EXAM NECK SPINE 6/>VWS: CPT | Mod: 26

## 2023-09-13 ENCOUNTER — APPOINTMENT (OUTPATIENT)
Dept: UROLOGY | Facility: CLINIC | Age: 73
End: 2023-09-13
Payer: MEDICARE

## 2023-09-13 ENCOUNTER — OUTPATIENT (OUTPATIENT)
Dept: OUTPATIENT SERVICES | Facility: HOSPITAL | Age: 73
LOS: 1 days | End: 2023-09-13
Payer: MEDICARE

## 2023-09-13 VITALS
RESPIRATION RATE: 15 BRPM | SYSTOLIC BLOOD PRESSURE: 128 MMHG | HEART RATE: 56 BPM | OXYGEN SATURATION: 98 % | DIASTOLIC BLOOD PRESSURE: 86 MMHG | TEMPERATURE: 98.1 F

## 2023-09-13 VITALS — SYSTOLIC BLOOD PRESSURE: 139 MMHG | HEART RATE: 60 BPM | DIASTOLIC BLOOD PRESSURE: 81 MMHG | RESPIRATION RATE: 15 BRPM

## 2023-09-13 DIAGNOSIS — Z98.890 OTHER SPECIFIED POSTPROCEDURAL STATES: Chronic | ICD-10-CM

## 2023-09-13 DIAGNOSIS — R35.0 FREQUENCY OF MICTURITION: ICD-10-CM

## 2023-09-13 PROCEDURE — 51720 TREATMENT OF BLADDER LESION: CPT

## 2023-09-13 PROCEDURE — 81003 URINALYSIS AUTO W/O SCOPE: CPT | Mod: QW

## 2023-09-13 RX ORDER — BACILLUS CALMETTE-GUERIN 50 MG/50ML
50 POWDER, FOR SUSPENSION INTRAVESICAL
Qty: 1 | Refills: 0 | Status: COMPLETED | OUTPATIENT
Start: 2023-09-13 | End: 2023-09-13

## 2023-09-13 RX ORDER — BACILLUS CALMETTE-GUERIN 50 MG/50ML
50 POWDER, FOR SUSPENSION INTRAVESICAL
Refills: 0 | Status: COMPLETED | COMMUNITY
Start: 2023-09-06

## 2023-09-14 ENCOUNTER — APPOINTMENT (OUTPATIENT)
Dept: SPINE | Facility: CLINIC | Age: 73
End: 2023-09-14
Payer: MEDICARE

## 2023-09-14 VITALS
OXYGEN SATURATION: 94 % | HEART RATE: 60 BPM | HEIGHT: 67.44 IN | BODY MASS INDEX: 35.12 KG/M2 | WEIGHT: 226.41 LBS | DIASTOLIC BLOOD PRESSURE: 75 MMHG | SYSTOLIC BLOOD PRESSURE: 127 MMHG

## 2023-09-14 DIAGNOSIS — M54.12 RADICULOPATHY, CERVICAL REGION: ICD-10-CM

## 2023-09-14 PROCEDURE — 99213 OFFICE O/P EST LOW 20 MIN: CPT

## 2023-09-15 DIAGNOSIS — C67.9 MALIGNANT NEOPLASM OF BLADDER, UNSPECIFIED: ICD-10-CM

## 2023-09-19 ENCOUNTER — OUTPATIENT (OUTPATIENT)
Dept: OUTPATIENT SERVICES | Facility: HOSPITAL | Age: 73
LOS: 1 days | End: 2023-09-19
Payer: MEDICARE

## 2023-09-19 ENCOUNTER — APPOINTMENT (OUTPATIENT)
Dept: UROLOGY | Facility: CLINIC | Age: 73
End: 2023-09-19
Payer: MEDICARE

## 2023-09-19 VITALS
OXYGEN SATURATION: 97 % | TEMPERATURE: 97.8 F | RESPIRATION RATE: 16 BRPM | HEART RATE: 67 BPM | SYSTOLIC BLOOD PRESSURE: 113 MMHG | DIASTOLIC BLOOD PRESSURE: 66 MMHG

## 2023-09-19 VITALS — RESPIRATION RATE: 16 BRPM | DIASTOLIC BLOOD PRESSURE: 81 MMHG | HEART RATE: 57 BPM | SYSTOLIC BLOOD PRESSURE: 135 MMHG

## 2023-09-19 DIAGNOSIS — Z98.890 OTHER SPECIFIED POSTPROCEDURAL STATES: Chronic | ICD-10-CM

## 2023-09-19 DIAGNOSIS — Z90.49 ACQUIRED ABSENCE OF OTHER SPECIFIED PARTS OF DIGESTIVE TRACT: Chronic | ICD-10-CM

## 2023-09-19 DIAGNOSIS — R35.0 FREQUENCY OF MICTURITION: ICD-10-CM

## 2023-09-19 PROCEDURE — 51720 TREATMENT OF BLADDER LESION: CPT

## 2023-09-19 RX ORDER — BACILLUS CALMETTE-GUERIN 50 MG/50ML
50 POWDER, FOR SUSPENSION INTRAVESICAL
Qty: 1 | Refills: 0 | Status: COMPLETED | OUTPATIENT
Start: 2023-09-19 | End: 2023-09-19

## 2023-09-25 DIAGNOSIS — C67.9 MALIGNANT NEOPLASM OF BLADDER, UNSPECIFIED: ICD-10-CM

## 2023-09-28 ENCOUNTER — APPOINTMENT (OUTPATIENT)
Dept: UROLOGY | Facility: CLINIC | Age: 73
End: 2023-09-28
Payer: MEDICARE

## 2023-09-28 ENCOUNTER — OUTPATIENT (OUTPATIENT)
Dept: OUTPATIENT SERVICES | Facility: HOSPITAL | Age: 73
LOS: 1 days | End: 2023-09-28
Payer: MEDICARE

## 2023-09-28 VITALS
HEIGHT: 67.44 IN | SYSTOLIC BLOOD PRESSURE: 144 MMHG | WEIGHT: 226 LBS | BODY MASS INDEX: 35.06 KG/M2 | DIASTOLIC BLOOD PRESSURE: 87 MMHG | OXYGEN SATURATION: 98 % | HEART RATE: 70 BPM | RESPIRATION RATE: 18 BRPM | TEMPERATURE: 98 F

## 2023-09-28 VITALS
RESPIRATION RATE: 16 BRPM | HEART RATE: 67 BPM | SYSTOLIC BLOOD PRESSURE: 130 MMHG | DIASTOLIC BLOOD PRESSURE: 70 MMHG | OXYGEN SATURATION: 98 % | TEMPERATURE: 98.1 F

## 2023-09-28 DIAGNOSIS — R35.0 FREQUENCY OF MICTURITION: ICD-10-CM

## 2023-09-28 DIAGNOSIS — Z98.890 OTHER SPECIFIED POSTPROCEDURAL STATES: Chronic | ICD-10-CM

## 2023-09-28 DIAGNOSIS — Z90.49 ACQUIRED ABSENCE OF OTHER SPECIFIED PARTS OF DIGESTIVE TRACT: Chronic | ICD-10-CM

## 2023-09-28 PROCEDURE — 51720 TREATMENT OF BLADDER LESION: CPT

## 2023-09-28 RX ORDER — BACILLUS CALMETTE-GUERIN 50 MG/50ML
50 POWDER, FOR SUSPENSION INTRAVESICAL
Qty: 1 | Refills: 0 | Status: COMPLETED | OUTPATIENT
Start: 2023-09-28 | End: 2023-09-28

## 2023-10-02 DIAGNOSIS — C67.9 MALIGNANT NEOPLASM OF BLADDER, UNSPECIFIED: ICD-10-CM

## 2023-10-03 ENCOUNTER — RESULT REVIEW (OUTPATIENT)
Age: 73
End: 2023-10-03

## 2023-10-03 ENCOUNTER — APPOINTMENT (OUTPATIENT)
Dept: HEMATOLOGY ONCOLOGY | Facility: CLINIC | Age: 73
End: 2023-10-03

## 2023-10-03 LAB
BASOPHILS # BLD AUTO: 0.06 K/UL — SIGNIFICANT CHANGE UP (ref 0–0.2)
BASOPHILS NFR BLD AUTO: 0.9 % — SIGNIFICANT CHANGE UP (ref 0–2)
EOSINOPHIL # BLD AUTO: 0.33 K/UL — SIGNIFICANT CHANGE UP (ref 0–0.5)
EOSINOPHIL NFR BLD AUTO: 4.8 % — SIGNIFICANT CHANGE UP (ref 0–6)
HCT VFR BLD CALC: 43.3 % — SIGNIFICANT CHANGE UP (ref 39–50)
HGB BLD-MCNC: 14.1 G/DL — SIGNIFICANT CHANGE UP (ref 13–17)
IMM GRANULOCYTES NFR BLD AUTO: 0.1 % — SIGNIFICANT CHANGE UP (ref 0–0.9)
LYMPHOCYTES # BLD AUTO: 2.19 K/UL — SIGNIFICANT CHANGE UP (ref 1–3.3)
LYMPHOCYTES # BLD AUTO: 31.6 % — SIGNIFICANT CHANGE UP (ref 13–44)
MCHC RBC-ENTMCNC: 28.7 PG — SIGNIFICANT CHANGE UP (ref 27–34)
MCHC RBC-ENTMCNC: 32.6 G/DL — SIGNIFICANT CHANGE UP (ref 32–36)
MCV RBC AUTO: 88 FL — SIGNIFICANT CHANGE UP (ref 80–100)
MONOCYTES # BLD AUTO: 0.56 K/UL — SIGNIFICANT CHANGE UP (ref 0–0.9)
MONOCYTES NFR BLD AUTO: 8.1 % — SIGNIFICANT CHANGE UP (ref 2–14)
NEUTROPHILS # BLD AUTO: 3.79 K/UL — SIGNIFICANT CHANGE UP (ref 1.8–7.4)
NEUTROPHILS NFR BLD AUTO: 54.5 % — SIGNIFICANT CHANGE UP (ref 43–77)
NRBC # BLD: 0 /100 WBCS — SIGNIFICANT CHANGE UP (ref 0–0)
PLATELET # BLD AUTO: 198 K/UL — SIGNIFICANT CHANGE UP (ref 150–400)
RBC # BLD: 4.92 M/UL — SIGNIFICANT CHANGE UP (ref 4.2–5.8)
RBC # FLD: 12.8 % — SIGNIFICANT CHANGE UP (ref 10.3–14.5)
WBC # BLD: 6.94 K/UL — SIGNIFICANT CHANGE UP (ref 3.8–10.5)
WBC # FLD AUTO: 6.94 K/UL — SIGNIFICANT CHANGE UP (ref 3.8–10.5)

## 2023-10-04 ENCOUNTER — NON-APPOINTMENT (OUTPATIENT)
Age: 73
End: 2023-10-04

## 2023-10-04 ENCOUNTER — APPOINTMENT (OUTPATIENT)
Dept: HEMATOLOGY ONCOLOGY | Facility: CLINIC | Age: 73
End: 2023-10-04
Payer: MEDICARE

## 2023-10-04 ENCOUNTER — APPOINTMENT (OUTPATIENT)
Dept: INFUSION THERAPY | Facility: HOSPITAL | Age: 73
End: 2023-10-04

## 2023-10-04 VITALS
SYSTOLIC BLOOD PRESSURE: 127 MMHG | DIASTOLIC BLOOD PRESSURE: 76 MMHG | TEMPERATURE: 97.3 F | WEIGHT: 226.41 LBS | HEART RATE: 70 BPM | RESPIRATION RATE: 16 BRPM | BODY MASS INDEX: 35 KG/M2 | OXYGEN SATURATION: 97 %

## 2023-10-04 PROCEDURE — 99213 OFFICE O/P EST LOW 20 MIN: CPT

## 2023-10-13 ENCOUNTER — RX RENEWAL (OUTPATIENT)
Age: 73
End: 2023-10-13

## 2023-11-01 ENCOUNTER — NON-APPOINTMENT (OUTPATIENT)
Age: 73
End: 2023-11-01

## 2023-11-01 RX ORDER — BACILLUS CALMETTE-GUERIN 50 MG/50ML
50 POWDER, FOR SUSPENSION INTRAVESICAL
Refills: 0 | Status: COMPLETED | COMMUNITY
Start: 2023-09-19

## 2023-11-01 RX ORDER — BACILLUS CALMETTE-GUERIN 50 MG/50ML
50 POWDER, FOR SUSPENSION INTRAVESICAL
Refills: 0 | Status: COMPLETED | COMMUNITY
Start: 2023-09-28

## 2023-11-01 RX ORDER — BACILLUS CALMETTE-GUERIN 50 MG/50ML
50 POWDER, FOR SUSPENSION INTRAVESICAL
Refills: 0 | Status: COMPLETED | COMMUNITY
Start: 2023-09-14

## 2023-11-06 ENCOUNTER — OUTPATIENT (OUTPATIENT)
Dept: OUTPATIENT SERVICES | Facility: HOSPITAL | Age: 73
LOS: 1 days | Discharge: HOME | End: 2023-11-06

## 2023-11-06 DIAGNOSIS — Z98.890 OTHER SPECIFIED POSTPROCEDURAL STATES: Chronic | ICD-10-CM

## 2023-11-06 DIAGNOSIS — Z90.49 ACQUIRED ABSENCE OF OTHER SPECIFIED PARTS OF DIGESTIVE TRACT: Chronic | ICD-10-CM

## 2023-11-06 DIAGNOSIS — C67.9 MALIGNANT NEOPLASM OF BLADDER, UNSPECIFIED: ICD-10-CM

## 2023-11-09 ENCOUNTER — OUTPATIENT (OUTPATIENT)
Dept: OUTPATIENT SERVICES | Facility: HOSPITAL | Age: 73
LOS: 1 days | End: 2023-11-09
Payer: MEDICARE

## 2023-11-09 ENCOUNTER — APPOINTMENT (OUTPATIENT)
Dept: UROLOGY | Facility: CLINIC | Age: 73
End: 2023-11-09

## 2023-11-09 ENCOUNTER — APPOINTMENT (OUTPATIENT)
Dept: UROLOGY | Facility: CLINIC | Age: 73
End: 2023-11-09
Payer: MEDICARE

## 2023-11-09 VITALS — DIASTOLIC BLOOD PRESSURE: 84 MMHG | SYSTOLIC BLOOD PRESSURE: 123 MMHG | HEART RATE: 71 BPM

## 2023-11-09 DIAGNOSIS — Z90.49 ACQUIRED ABSENCE OF OTHER SPECIFIED PARTS OF DIGESTIVE TRACT: Chronic | ICD-10-CM

## 2023-11-09 DIAGNOSIS — Z98.890 OTHER SPECIFIED POSTPROCEDURAL STATES: Chronic | ICD-10-CM

## 2023-11-09 DIAGNOSIS — C67.9 MALIGNANT NEOPLASM OF BLADDER, UNSPECIFIED: ICD-10-CM

## 2023-11-09 DIAGNOSIS — N39.0 URINARY TRACT INFECTION, SITE NOT SPECIFIED: ICD-10-CM

## 2023-11-09 DIAGNOSIS — R35.0 FREQUENCY OF MICTURITION: ICD-10-CM

## 2023-11-09 LAB
APPEARANCE: ABNORMAL
BACTERIA UR CULT: ABNORMAL
BACTERIA: ABNORMAL /HPF
BILIRUBIN URINE: NEGATIVE
BLOOD URINE: ABNORMAL
CALCIUM OXALATE CRYSTALS: PRESENT
CAST: 4 /LPF
COLOR: YELLOW
EPITHELIAL CELLS: 3 /HPF
GLUCOSE QUALITATIVE U: NEGATIVE MG/DL
KETONES URINE: ABNORMAL MG/DL
LEUKOCYTE ESTERASE URINE: ABNORMAL
MICROSCOPIC-UA: NORMAL
NITRITE URINE: NEGATIVE
PH URINE: 5.5
PROTEIN URINE: 300 MG/DL
RED BLOOD CELLS URINE: 153 /HPF
REVIEW: NORMAL
SPECIFIC GRAVITY URINE: 1.03
UROBILINOGEN URINE: 0.2 MG/DL
WHITE BLOOD CELLS URINE: >998 /HPF

## 2023-11-09 PROCEDURE — 52000 CYSTOURETHROSCOPY: CPT

## 2023-11-09 RX ORDER — DIAZEPAM 5 MG/1
5 TABLET ORAL
Qty: 1 | Refills: 0 | Status: COMPLETED | COMMUNITY
Start: 2023-11-09 | End: 2023-11-11

## 2023-11-09 RX ORDER — LEVOFLOXACIN 500 MG/1
500 TABLET, FILM COATED ORAL DAILY
Qty: 10 | Refills: 0 | Status: COMPLETED | COMMUNITY
Start: 2023-11-09 | End: 2023-11-20

## 2023-11-14 LAB — BACTERIA UR CULT: NORMAL

## 2023-11-15 ENCOUNTER — NON-APPOINTMENT (OUTPATIENT)
Age: 73
End: 2023-11-15

## 2023-11-15 ENCOUNTER — APPOINTMENT (OUTPATIENT)
Dept: HEMATOLOGY ONCOLOGY | Facility: CLINIC | Age: 73
End: 2023-11-15
Payer: MEDICARE

## 2023-11-15 ENCOUNTER — APPOINTMENT (OUTPATIENT)
Dept: INFUSION THERAPY | Facility: HOSPITAL | Age: 73
End: 2023-11-15

## 2023-11-15 VITALS
RESPIRATION RATE: 16 BRPM | HEART RATE: 99 BPM | OXYGEN SATURATION: 97 % | WEIGHT: 220 LBS | DIASTOLIC BLOOD PRESSURE: 82 MMHG | TEMPERATURE: 97.7 F | HEIGHT: 67.44 IN | SYSTOLIC BLOOD PRESSURE: 126 MMHG | BODY MASS INDEX: 34.13 KG/M2

## 2023-11-15 PROCEDURE — 99214 OFFICE O/P EST MOD 30 MIN: CPT

## 2023-11-15 RX ORDER — GABAPENTIN 300 MG/1
300 CAPSULE ORAL TWICE DAILY
Refills: 0 | Status: DISCONTINUED | COMMUNITY
Start: 2023-09-06 | End: 2023-11-15

## 2023-11-16 ENCOUNTER — APPOINTMENT (OUTPATIENT)
Dept: UROLOGY | Facility: CLINIC | Age: 73
End: 2023-11-16

## 2023-11-16 LAB
APPEARANCE: ABNORMAL
BACTERIA: ABNORMAL /HPF
BILIRUBIN URINE: NEGATIVE
BLOOD URINE: ABNORMAL
COLOR: YELLOW
GLUCOSE QUALITATIVE U: NEGATIVE MG/DL
HYALINE CASTS: PRESENT
KETONES URINE: ABNORMAL MG/DL
LEUKOCYTE ESTERASE URINE: ABNORMAL
MICROSCOPIC-UA: NORMAL
NITRITE URINE: NEGATIVE
PH URINE: 5.5
PROTEIN URINE: 300 MG/DL
RED BLOOD CELLS URINE: 500 /HPF
SPECIFIC GRAVITY URINE: 1.03
UROBILINOGEN URINE: 1 MG/DL
WHITE BLOOD CELLS URINE: 819 /HPF

## 2023-11-20 ENCOUNTER — APPOINTMENT (OUTPATIENT)
Dept: NEPHROLOGY | Facility: CLINIC | Age: 73
End: 2023-11-20
Payer: MEDICARE

## 2023-11-20 ENCOUNTER — LABORATORY RESULT (OUTPATIENT)
Age: 73
End: 2023-11-20

## 2023-11-20 VITALS
DIASTOLIC BLOOD PRESSURE: 70 MMHG | OXYGEN SATURATION: 97 % | BODY MASS INDEX: 34.54 KG/M2 | WEIGHT: 222.66 LBS | HEIGHT: 67.5 IN | TEMPERATURE: 97.7 F | SYSTOLIC BLOOD PRESSURE: 106 MMHG | HEART RATE: 89 BPM

## 2023-11-20 DIAGNOSIS — Z80.1 FAMILY HISTORY OF MALIGNANT NEOPLASM OF TRACHEA, BRONCHUS AND LUNG: ICD-10-CM

## 2023-11-20 DIAGNOSIS — Z98.1 ARTHRODESIS STATUS: ICD-10-CM

## 2023-11-20 DIAGNOSIS — J43.9 EMPHYSEMA, UNSPECIFIED: ICD-10-CM

## 2023-11-20 DIAGNOSIS — Z80.0 FAMILY HISTORY OF MALIGNANT NEOPLASM OF DIGESTIVE ORGANS: ICD-10-CM

## 2023-11-20 DIAGNOSIS — Z78.9 OTHER SPECIFIED HEALTH STATUS: ICD-10-CM

## 2023-11-20 DIAGNOSIS — Z87.891 PERSONAL HISTORY OF NICOTINE DEPENDENCE: ICD-10-CM

## 2023-11-20 PROCEDURE — 99204 OFFICE O/P NEW MOD 45 MIN: CPT | Mod: GC

## 2023-11-21 ENCOUNTER — APPOINTMENT (OUTPATIENT)
Dept: UROLOGY | Facility: CLINIC | Age: 73
End: 2023-11-21

## 2023-11-21 ENCOUNTER — APPOINTMENT (OUTPATIENT)
Dept: UROLOGY | Facility: CLINIC | Age: 73
End: 2023-11-21
Payer: MEDICARE

## 2023-11-21 VITALS
SYSTOLIC BLOOD PRESSURE: 127 MMHG | BODY MASS INDEX: 34.44 KG/M2 | WEIGHT: 222 LBS | RESPIRATION RATE: 17 BRPM | HEIGHT: 67.5 IN | DIASTOLIC BLOOD PRESSURE: 66 MMHG | TEMPERATURE: 98 F | HEART RATE: 66 BPM

## 2023-11-21 DIAGNOSIS — Z87.448 PERSONAL HISTORY OF OTHER DISEASES OF URINARY SYSTEM: ICD-10-CM

## 2023-11-21 LAB
ALBUMIN SERPL ELPH-MCNC: 4.6 G/DL
ANION GAP SERPL CALC-SCNC: 11 MMOL/L
APPEARANCE: ABNORMAL
BACTERIA: NEGATIVE /HPF
BASOPHILS # BLD AUTO: 0.06 K/UL
BASOPHILS NFR BLD AUTO: 0.6 %
BILIRUBIN URINE: NEGATIVE
BLOOD URINE: ABNORMAL
BUN SERPL-MCNC: 14 MG/DL
CALCIUM SERPL-MCNC: 9.6 MG/DL
CAST: NORMAL /LPF
CHLORIDE SERPL-SCNC: 105 MMOL/L
CO2 SERPL-SCNC: 26 MMOL/L
COLOR: NORMAL
CREAT SERPL-MCNC: 1.26 MG/DL
CREAT SPEC-SCNC: 333 MG/DL
CRP SERPL-MCNC: 3 MG/L
EGFR: 60 ML/MIN/1.73M2
EOSINOPHIL # BLD AUTO: 0.42 K/UL
EOSINOPHIL NFR BLD AUTO: 3.9 %
EPITHELIAL CELLS: 2 /HPF
ERYTHROCYTE [SEDIMENTATION RATE] IN BLOOD BY WESTERGREN METHOD: 3 MM/HR
GLUCOSE QUALITATIVE U: NEGATIVE MG/DL
GLUCOSE SERPL-MCNC: 107 MG/DL
HCT VFR BLD CALC: 48.3 %
HGB BLD-MCNC: 15 G/DL
IMM GRANULOCYTES NFR BLD AUTO: 0.6 %
KETONES URINE: ABNORMAL MG/DL
LEUKOCYTE ESTERASE URINE: ABNORMAL
LYMPHOCYTES # BLD AUTO: 2.07 K/UL
LYMPHOCYTES NFR BLD AUTO: 19.4 %
MAN DIFF?: NORMAL
MCHC RBC-ENTMCNC: 28.8 PG
MCHC RBC-ENTMCNC: 31.1 GM/DL
MCV RBC AUTO: 92.7 FL
MICROALBUMIN 24H UR DL<=1MG/L-MCNC: 390.2 MG/DL
MICROALBUMIN/CREAT 24H UR-RTO: NORMAL MG/G
MICROSCOPIC-UA: NORMAL
MONOCYTES # BLD AUTO: 0.69 K/UL
MONOCYTES NFR BLD AUTO: 6.5 %
NEUTROPHILS # BLD AUTO: 7.39 K/UL
NEUTROPHILS NFR BLD AUTO: 69 %
NITRITE URINE: NEGATIVE
PH URINE: 6
PHOSPHATE SERPL-MCNC: 3.4 MG/DL
PLATELET # BLD AUTO: 212 K/UL
POTASSIUM SERPL-SCNC: 4.6 MMOL/L
PROTEIN URINE: 300 MG/DL
RBC # BLD: 5.21 M/UL
RBC # FLD: 13.1 %
RED BLOOD CELLS URINE: 92 /HPF
REVIEW: NORMAL
SODIUM SERPL-SCNC: 143 MMOL/L
SPECIFIC GRAVITY URINE: 1.03
UROBILINOGEN URINE: 1 MG/DL
WBC # FLD AUTO: 10.69 K/UL
WHITE BLOOD CELLS URINE: >998 /HPF

## 2023-11-21 PROCEDURE — 99213 OFFICE O/P EST LOW 20 MIN: CPT

## 2023-11-22 LAB — BACTERIA UR CULT: NORMAL

## 2023-11-26 PROBLEM — Z87.448 HISTORY OF URETHRAL STRICTURE: Status: ACTIVE | Noted: 2020-10-08

## 2023-11-29 ENCOUNTER — APPOINTMENT (OUTPATIENT)
Dept: UROLOGY | Facility: CLINIC | Age: 73
End: 2023-11-29
Payer: MEDICARE

## 2023-11-29 ENCOUNTER — OUTPATIENT (OUTPATIENT)
Dept: OUTPATIENT SERVICES | Facility: HOSPITAL | Age: 73
LOS: 1 days | End: 2023-11-29
Payer: MEDICARE

## 2023-11-29 VITALS
DIASTOLIC BLOOD PRESSURE: 76 MMHG | SYSTOLIC BLOOD PRESSURE: 136 MMHG | BODY MASS INDEX: 34.14 KG/M2 | TEMPERATURE: 97.7 F | WEIGHT: 221.25 LBS | HEART RATE: 80 BPM | RESPIRATION RATE: 18 BRPM

## 2023-11-29 VITALS — SYSTOLIC BLOOD PRESSURE: 126 MMHG | HEART RATE: 76 BPM | DIASTOLIC BLOOD PRESSURE: 70 MMHG

## 2023-11-29 DIAGNOSIS — R35.0 FREQUENCY OF MICTURITION: ICD-10-CM

## 2023-11-29 DIAGNOSIS — Z98.890 OTHER SPECIFIED POSTPROCEDURAL STATES: Chronic | ICD-10-CM

## 2023-11-29 PROCEDURE — 51720 TREATMENT OF BLADDER LESION: CPT

## 2023-11-29 RX ORDER — BACILLUS CALMETTE-GUERIN 50 MG/50ML
50 POWDER, FOR SUSPENSION INTRAVESICAL
Qty: 1 | Refills: 0 | Status: COMPLETED | OUTPATIENT
Start: 2023-11-29 | End: 2023-11-29

## 2023-12-01 ENCOUNTER — RESULT REVIEW (OUTPATIENT)
Age: 73
End: 2023-12-01

## 2023-12-01 ENCOUNTER — NON-APPOINTMENT (OUTPATIENT)
Age: 73
End: 2023-12-01

## 2023-12-01 ENCOUNTER — APPOINTMENT (OUTPATIENT)
Dept: INFUSION THERAPY | Facility: HOSPITAL | Age: 73
End: 2023-12-01

## 2023-12-01 ENCOUNTER — APPOINTMENT (OUTPATIENT)
Dept: HEMATOLOGY ONCOLOGY | Facility: CLINIC | Age: 73
End: 2023-12-01
Payer: MEDICARE

## 2023-12-01 VITALS
WEIGHT: 218.92 LBS | OXYGEN SATURATION: 95 % | RESPIRATION RATE: 16 BRPM | DIASTOLIC BLOOD PRESSURE: 74 MMHG | HEIGHT: 67.52 IN | HEART RATE: 75 BPM | BODY MASS INDEX: 33.57 KG/M2 | SYSTOLIC BLOOD PRESSURE: 121 MMHG | TEMPERATURE: 97.2 F

## 2023-12-01 LAB
ALBUMIN SERPL ELPH-MCNC: 4.3 G/DL — SIGNIFICANT CHANGE UP (ref 3.3–5)
ALBUMIN SERPL ELPH-MCNC: 4.3 G/DL — SIGNIFICANT CHANGE UP (ref 3.3–5)
ALP SERPL-CCNC: 72 U/L — SIGNIFICANT CHANGE UP (ref 40–120)
ALP SERPL-CCNC: 72 U/L — SIGNIFICANT CHANGE UP (ref 40–120)
ALT FLD-CCNC: 21 U/L — SIGNIFICANT CHANGE UP (ref 10–45)
ALT FLD-CCNC: 21 U/L — SIGNIFICANT CHANGE UP (ref 10–45)
ANION GAP SERPL CALC-SCNC: 10 MMOL/L — SIGNIFICANT CHANGE UP (ref 5–17)
ANION GAP SERPL CALC-SCNC: 10 MMOL/L — SIGNIFICANT CHANGE UP (ref 5–17)
APPEARANCE: CLEAR
AST SERPL-CCNC: 23 U/L — SIGNIFICANT CHANGE UP (ref 10–40)
AST SERPL-CCNC: 23 U/L — SIGNIFICANT CHANGE UP (ref 10–40)
BACTERIA: ABNORMAL /HPF
BASOPHILS # BLD AUTO: 0.04 K/UL — SIGNIFICANT CHANGE UP (ref 0–0.2)
BASOPHILS # BLD AUTO: 0.04 K/UL — SIGNIFICANT CHANGE UP (ref 0–0.2)
BASOPHILS NFR BLD AUTO: 0.5 % — SIGNIFICANT CHANGE UP (ref 0–2)
BASOPHILS NFR BLD AUTO: 0.5 % — SIGNIFICANT CHANGE UP (ref 0–2)
BILIRUB SERPL-MCNC: 0.2 MG/DL — SIGNIFICANT CHANGE UP (ref 0.2–1.2)
BILIRUB SERPL-MCNC: 0.2 MG/DL — SIGNIFICANT CHANGE UP (ref 0.2–1.2)
BILIRUBIN URINE: NEGATIVE
BLOOD URINE: ABNORMAL
BUN SERPL-MCNC: 14 MG/DL — SIGNIFICANT CHANGE UP (ref 7–23)
BUN SERPL-MCNC: 14 MG/DL — SIGNIFICANT CHANGE UP (ref 7–23)
CALCIUM SERPL-MCNC: 8.9 MG/DL — SIGNIFICANT CHANGE UP (ref 8.4–10.5)
CALCIUM SERPL-MCNC: 8.9 MG/DL — SIGNIFICANT CHANGE UP (ref 8.4–10.5)
CAST: 9 /LPF
CHLORIDE SERPL-SCNC: 107 MMOL/L — SIGNIFICANT CHANGE UP (ref 96–108)
CHLORIDE SERPL-SCNC: 107 MMOL/L — SIGNIFICANT CHANGE UP (ref 96–108)
CO2 SERPL-SCNC: 26 MMOL/L — SIGNIFICANT CHANGE UP (ref 22–31)
CO2 SERPL-SCNC: 26 MMOL/L — SIGNIFICANT CHANGE UP (ref 22–31)
COLOR: YELLOW
CREAT SERPL-MCNC: 1.1 MG/DL — SIGNIFICANT CHANGE UP (ref 0.5–1.3)
CREAT SERPL-MCNC: 1.1 MG/DL — SIGNIFICANT CHANGE UP (ref 0.5–1.3)
EGFR: 70 ML/MIN/1.73M2 — SIGNIFICANT CHANGE UP
EGFR: 70 ML/MIN/1.73M2 — SIGNIFICANT CHANGE UP
EOSINOPHIL # BLD AUTO: 0.4 K/UL — SIGNIFICANT CHANGE UP (ref 0–0.5)
EOSINOPHIL # BLD AUTO: 0.4 K/UL — SIGNIFICANT CHANGE UP (ref 0–0.5)
EOSINOPHIL NFR BLD AUTO: 5.2 % — SIGNIFICANT CHANGE UP (ref 0–6)
EOSINOPHIL NFR BLD AUTO: 5.2 % — SIGNIFICANT CHANGE UP (ref 0–6)
EPITHELIAL CELLS: 13 /HPF
GLUCOSE QUALITATIVE U: NEGATIVE MG/DL
GLUCOSE SERPL-MCNC: 97 MG/DL — SIGNIFICANT CHANGE UP (ref 70–99)
GLUCOSE SERPL-MCNC: 97 MG/DL — SIGNIFICANT CHANGE UP (ref 70–99)
HCT VFR BLD CALC: 42.4 % — SIGNIFICANT CHANGE UP (ref 39–50)
HCT VFR BLD CALC: 42.4 % — SIGNIFICANT CHANGE UP (ref 39–50)
HGB BLD-MCNC: 14.2 G/DL — SIGNIFICANT CHANGE UP (ref 13–17)
HGB BLD-MCNC: 14.2 G/DL — SIGNIFICANT CHANGE UP (ref 13–17)
HYALINE CASTS: PRESENT
IMM GRANULOCYTES NFR BLD AUTO: 0.1 % — SIGNIFICANT CHANGE UP (ref 0–0.9)
IMM GRANULOCYTES NFR BLD AUTO: 0.1 % — SIGNIFICANT CHANGE UP (ref 0–0.9)
KETONES URINE: NEGATIVE MG/DL
LEUKOCYTE ESTERASE URINE: ABNORMAL
LYMPHOCYTES # BLD AUTO: 2.01 K/UL — SIGNIFICANT CHANGE UP (ref 1–3.3)
LYMPHOCYTES # BLD AUTO: 2.01 K/UL — SIGNIFICANT CHANGE UP (ref 1–3.3)
LYMPHOCYTES # BLD AUTO: 26 % — SIGNIFICANT CHANGE UP (ref 13–44)
LYMPHOCYTES # BLD AUTO: 26 % — SIGNIFICANT CHANGE UP (ref 13–44)
MCHC RBC-ENTMCNC: 28.9 PG — SIGNIFICANT CHANGE UP (ref 27–34)
MCHC RBC-ENTMCNC: 28.9 PG — SIGNIFICANT CHANGE UP (ref 27–34)
MCHC RBC-ENTMCNC: 33.5 G/DL — SIGNIFICANT CHANGE UP (ref 32–36)
MCHC RBC-ENTMCNC: 33.5 G/DL — SIGNIFICANT CHANGE UP (ref 32–36)
MCV RBC AUTO: 86.4 FL — SIGNIFICANT CHANGE UP (ref 80–100)
MCV RBC AUTO: 86.4 FL — SIGNIFICANT CHANGE UP (ref 80–100)
MICROSCOPIC-UA: NORMAL
MONOCYTES # BLD AUTO: 0.56 K/UL — SIGNIFICANT CHANGE UP (ref 0–0.9)
MONOCYTES # BLD AUTO: 0.56 K/UL — SIGNIFICANT CHANGE UP (ref 0–0.9)
MONOCYTES NFR BLD AUTO: 7.2 % — SIGNIFICANT CHANGE UP (ref 2–14)
MONOCYTES NFR BLD AUTO: 7.2 % — SIGNIFICANT CHANGE UP (ref 2–14)
NEUTROPHILS # BLD AUTO: 4.72 K/UL — SIGNIFICANT CHANGE UP (ref 1.8–7.4)
NEUTROPHILS # BLD AUTO: 4.72 K/UL — SIGNIFICANT CHANGE UP (ref 1.8–7.4)
NEUTROPHILS NFR BLD AUTO: 61 % — SIGNIFICANT CHANGE UP (ref 43–77)
NEUTROPHILS NFR BLD AUTO: 61 % — SIGNIFICANT CHANGE UP (ref 43–77)
NITRITE URINE: NEGATIVE
NRBC # BLD: 0 /100 WBCS — SIGNIFICANT CHANGE UP (ref 0–0)
NRBC # BLD: 0 /100 WBCS — SIGNIFICANT CHANGE UP (ref 0–0)
PH URINE: 6
PLATELET # BLD AUTO: 175 K/UL — SIGNIFICANT CHANGE UP (ref 150–400)
PLATELET # BLD AUTO: 175 K/UL — SIGNIFICANT CHANGE UP (ref 150–400)
POTASSIUM SERPL-MCNC: 4.4 MMOL/L — SIGNIFICANT CHANGE UP (ref 3.5–5.3)
POTASSIUM SERPL-MCNC: 4.4 MMOL/L — SIGNIFICANT CHANGE UP (ref 3.5–5.3)
POTASSIUM SERPL-SCNC: 4.4 MMOL/L — SIGNIFICANT CHANGE UP (ref 3.5–5.3)
POTASSIUM SERPL-SCNC: 4.4 MMOL/L — SIGNIFICANT CHANGE UP (ref 3.5–5.3)
PROT SERPL-MCNC: 6.6 G/DL — SIGNIFICANT CHANGE UP (ref 6–8.3)
PROT SERPL-MCNC: 6.6 G/DL — SIGNIFICANT CHANGE UP (ref 6–8.3)
PROTEIN URINE: 300 MG/DL
RBC # BLD: 4.91 M/UL — SIGNIFICANT CHANGE UP (ref 4.2–5.8)
RBC # BLD: 4.91 M/UL — SIGNIFICANT CHANGE UP (ref 4.2–5.8)
RBC # FLD: 12.4 % — SIGNIFICANT CHANGE UP (ref 10.3–14.5)
RBC # FLD: 12.4 % — SIGNIFICANT CHANGE UP (ref 10.3–14.5)
RED BLOOD CELLS URINE: 184 /HPF
REVIEW: NORMAL
SODIUM SERPL-SCNC: 142 MMOL/L — SIGNIFICANT CHANGE UP (ref 135–145)
SODIUM SERPL-SCNC: 142 MMOL/L — SIGNIFICANT CHANGE UP (ref 135–145)
SPECIFIC GRAVITY URINE: 1.02
T4 FREE SERPL-MCNC: 1.3 NG/DL — SIGNIFICANT CHANGE UP (ref 0.9–1.8)
T4 FREE SERPL-MCNC: 1.3 NG/DL — SIGNIFICANT CHANGE UP (ref 0.9–1.8)
TSH SERPL-MCNC: 0.94 UIU/ML — SIGNIFICANT CHANGE UP (ref 0.27–4.2)
TSH SERPL-MCNC: 0.94 UIU/ML — SIGNIFICANT CHANGE UP (ref 0.27–4.2)
UROBILINOGEN URINE: 1 MG/DL
WBC # BLD: 7.74 K/UL — SIGNIFICANT CHANGE UP (ref 3.8–10.5)
WBC # BLD: 7.74 K/UL — SIGNIFICANT CHANGE UP (ref 3.8–10.5)
WBC # FLD AUTO: 7.74 K/UL — SIGNIFICANT CHANGE UP (ref 3.8–10.5)
WBC # FLD AUTO: 7.74 K/UL — SIGNIFICANT CHANGE UP (ref 3.8–10.5)
WHITE BLOOD CELLS URINE: >998 /HPF

## 2023-12-01 PROCEDURE — 99214 OFFICE O/P EST MOD 30 MIN: CPT

## 2023-12-01 RX ORDER — BACILLUS CALMETTE-GUERIN 50 MG/50ML
50 POWDER, FOR SUSPENSION INTRAVESICAL
Refills: 0 | Status: COMPLETED | COMMUNITY
Start: 2023-11-29

## 2023-12-01 RX ORDER — METHENAMINE HIPPURATE 1 G/1
1 TABLET ORAL DAILY
Qty: 90 | Refills: 3 | Status: DISCONTINUED | COMMUNITY
Start: 2023-07-10 | End: 2023-12-01

## 2023-12-04 DIAGNOSIS — Z00.6 ENCOUNTER FOR EXAMINATION FOR NORMAL COMPARISON AND CONTROL IN CLINICAL RESEARCH PROGRAM: ICD-10-CM

## 2023-12-04 DIAGNOSIS — Z51.11 ENCOUNTER FOR ANTINEOPLASTIC CHEMOTHERAPY: ICD-10-CM

## 2023-12-06 RX ORDER — TAMSULOSIN HYDROCHLORIDE 0.4 MG/1
0.4 CAPSULE ORAL
Qty: 90 | Refills: 3 | Status: ACTIVE | COMMUNITY
Start: 2023-11-01 | End: 1900-01-01

## 2023-12-07 ENCOUNTER — APPOINTMENT (OUTPATIENT)
Dept: UROLOGY | Facility: CLINIC | Age: 73
End: 2023-12-07
Payer: MEDICARE

## 2023-12-07 ENCOUNTER — OUTPATIENT (OUTPATIENT)
Dept: OUTPATIENT SERVICES | Facility: HOSPITAL | Age: 73
LOS: 1 days | End: 2023-12-07
Payer: MEDICARE

## 2023-12-07 VITALS
DIASTOLIC BLOOD PRESSURE: 75 MMHG | SYSTOLIC BLOOD PRESSURE: 133 MMHG | HEART RATE: 64 BPM | RESPIRATION RATE: 15 BRPM | OXYGEN SATURATION: 96 %

## 2023-12-07 VITALS
OXYGEN SATURATION: 97 % | RESPIRATION RATE: 17 BRPM | HEART RATE: 66 BPM | DIASTOLIC BLOOD PRESSURE: 80 MMHG | SYSTOLIC BLOOD PRESSURE: 145 MMHG | TEMPERATURE: 98.2 F

## 2023-12-07 DIAGNOSIS — Z98.890 OTHER SPECIFIED POSTPROCEDURAL STATES: Chronic | ICD-10-CM

## 2023-12-07 DIAGNOSIS — R35.0 FREQUENCY OF MICTURITION: ICD-10-CM

## 2023-12-07 DIAGNOSIS — C67.9 MALIGNANT NEOPLASM OF BLADDER, UNSPECIFIED: ICD-10-CM

## 2023-12-07 DIAGNOSIS — Z90.49 ACQUIRED ABSENCE OF OTHER SPECIFIED PARTS OF DIGESTIVE TRACT: Chronic | ICD-10-CM

## 2023-12-07 PROCEDURE — 51720 TREATMENT OF BLADDER LESION: CPT

## 2023-12-07 RX ORDER — BACILLUS CALMETTE-GUERIN 50 MG/50ML
50 POWDER, FOR SUSPENSION INTRAVESICAL
Qty: 1 | Refills: 0 | Status: COMPLETED | OUTPATIENT
Start: 2023-12-07 | End: 2023-12-07

## 2023-12-14 ENCOUNTER — OUTPATIENT (OUTPATIENT)
Dept: OUTPATIENT SERVICES | Facility: HOSPITAL | Age: 73
LOS: 1 days | End: 2023-12-14
Payer: MEDICARE

## 2023-12-14 ENCOUNTER — APPOINTMENT (OUTPATIENT)
Dept: UROLOGY | Facility: CLINIC | Age: 73
End: 2023-12-14
Payer: MEDICARE

## 2023-12-14 VITALS
SYSTOLIC BLOOD PRESSURE: 117 MMHG | DIASTOLIC BLOOD PRESSURE: 71 MMHG | RESPIRATION RATE: 16 BRPM | TEMPERATURE: 98 F | HEART RATE: 81 BPM

## 2023-12-14 VITALS — HEART RATE: 73 BPM | SYSTOLIC BLOOD PRESSURE: 109 MMHG | DIASTOLIC BLOOD PRESSURE: 63 MMHG

## 2023-12-14 DIAGNOSIS — Z98.890 OTHER SPECIFIED POSTPROCEDURAL STATES: Chronic | ICD-10-CM

## 2023-12-14 DIAGNOSIS — R35.0 FREQUENCY OF MICTURITION: ICD-10-CM

## 2023-12-14 DIAGNOSIS — Z90.49 ACQUIRED ABSENCE OF OTHER SPECIFIED PARTS OF DIGESTIVE TRACT: Chronic | ICD-10-CM

## 2023-12-14 PROCEDURE — 51720 TREATMENT OF BLADDER LESION: CPT

## 2023-12-14 RX ORDER — BACILLUS CALMETTE-GUERIN 50 MG/50ML
50 POWDER, FOR SUSPENSION INTRAVESICAL
Qty: 1 | Refills: 0 | Status: COMPLETED | OUTPATIENT
Start: 2023-12-14 | End: 2023-12-14

## 2023-12-18 DIAGNOSIS — C67.9 MALIGNANT NEOPLASM OF BLADDER, UNSPECIFIED: ICD-10-CM

## 2023-12-21 ENCOUNTER — NON-APPOINTMENT (OUTPATIENT)
Age: 73
End: 2023-12-21

## 2023-12-26 ENCOUNTER — APPOINTMENT (OUTPATIENT)
Dept: HEMATOLOGY ONCOLOGY | Facility: CLINIC | Age: 73
End: 2023-12-26

## 2023-12-26 ENCOUNTER — APPOINTMENT (OUTPATIENT)
Dept: INFUSION THERAPY | Facility: HOSPITAL | Age: 73
End: 2023-12-26

## 2024-01-03 ENCOUNTER — NON-APPOINTMENT (OUTPATIENT)
Age: 74
End: 2024-01-03

## 2024-01-04 ENCOUNTER — OUTPATIENT (OUTPATIENT)
Dept: OUTPATIENT SERVICES | Facility: HOSPITAL | Age: 74
LOS: 1 days | Discharge: HOME | End: 2024-01-04

## 2024-01-04 DIAGNOSIS — Z98.890 OTHER SPECIFIED POSTPROCEDURAL STATES: Chronic | ICD-10-CM

## 2024-01-04 DIAGNOSIS — Z90.49 ACQUIRED ABSENCE OF OTHER SPECIFIED PARTS OF DIGESTIVE TRACT: Chronic | ICD-10-CM

## 2024-01-04 DIAGNOSIS — C67.9 MALIGNANT NEOPLASM OF BLADDER, UNSPECIFIED: ICD-10-CM

## 2024-01-10 ENCOUNTER — NON-APPOINTMENT (OUTPATIENT)
Age: 74
End: 2024-01-10

## 2024-01-10 ENCOUNTER — RESULT REVIEW (OUTPATIENT)
Age: 74
End: 2024-01-10

## 2024-01-10 ENCOUNTER — APPOINTMENT (OUTPATIENT)
Dept: INFUSION THERAPY | Facility: HOSPITAL | Age: 74
End: 2024-01-10

## 2024-01-10 ENCOUNTER — APPOINTMENT (OUTPATIENT)
Dept: HEMATOLOGY ONCOLOGY | Facility: CLINIC | Age: 74
End: 2024-01-10
Payer: MEDICARE

## 2024-01-10 VITALS
HEART RATE: 81 BPM | SYSTOLIC BLOOD PRESSURE: 117 MMHG | TEMPERATURE: 97.88 F | RESPIRATION RATE: 17 BRPM | DIASTOLIC BLOOD PRESSURE: 68 MMHG | OXYGEN SATURATION: 96 %

## 2024-01-10 DIAGNOSIS — R39.15 URGENCY OF URINATION: ICD-10-CM

## 2024-01-10 DIAGNOSIS — E83.39 OTHER DISORDERS OF PHOSPHORUS METABOLISM: ICD-10-CM

## 2024-01-10 DIAGNOSIS — R80.9 PROTEINURIA, UNSPECIFIED: ICD-10-CM

## 2024-01-10 LAB
ALBUMIN SERPL ELPH-MCNC: 4.3 G/DL — SIGNIFICANT CHANGE UP (ref 3.3–5)
ALBUMIN SERPL ELPH-MCNC: 4.3 G/DL — SIGNIFICANT CHANGE UP (ref 3.3–5)
ALP SERPL-CCNC: 67 U/L — SIGNIFICANT CHANGE UP (ref 40–120)
ALP SERPL-CCNC: 67 U/L — SIGNIFICANT CHANGE UP (ref 40–120)
ALT FLD-CCNC: 15 U/L — SIGNIFICANT CHANGE UP (ref 10–45)
ALT FLD-CCNC: 15 U/L — SIGNIFICANT CHANGE UP (ref 10–45)
ANION GAP SERPL CALC-SCNC: 13 MMOL/L — SIGNIFICANT CHANGE UP (ref 5–17)
ANION GAP SERPL CALC-SCNC: 13 MMOL/L — SIGNIFICANT CHANGE UP (ref 5–17)
AST SERPL-CCNC: 29 U/L — SIGNIFICANT CHANGE UP (ref 10–40)
AST SERPL-CCNC: 29 U/L — SIGNIFICANT CHANGE UP (ref 10–40)
BASOPHILS # BLD AUTO: 0.05 K/UL — SIGNIFICANT CHANGE UP (ref 0–0.2)
BASOPHILS # BLD AUTO: 0.05 K/UL — SIGNIFICANT CHANGE UP (ref 0–0.2)
BASOPHILS NFR BLD AUTO: 0.7 % — SIGNIFICANT CHANGE UP (ref 0–2)
BASOPHILS NFR BLD AUTO: 0.7 % — SIGNIFICANT CHANGE UP (ref 0–2)
BILIRUB SERPL-MCNC: 0.3 MG/DL — SIGNIFICANT CHANGE UP (ref 0.2–1.2)
BILIRUB SERPL-MCNC: 0.3 MG/DL — SIGNIFICANT CHANGE UP (ref 0.2–1.2)
BUN SERPL-MCNC: 17 MG/DL — SIGNIFICANT CHANGE UP (ref 7–23)
BUN SERPL-MCNC: 17 MG/DL — SIGNIFICANT CHANGE UP (ref 7–23)
CALCIUM SERPL-MCNC: 9.3 MG/DL — SIGNIFICANT CHANGE UP (ref 8.4–10.5)
CALCIUM SERPL-MCNC: 9.3 MG/DL — SIGNIFICANT CHANGE UP (ref 8.4–10.5)
CHLORIDE SERPL-SCNC: 106 MMOL/L — SIGNIFICANT CHANGE UP (ref 96–108)
CHLORIDE SERPL-SCNC: 106 MMOL/L — SIGNIFICANT CHANGE UP (ref 96–108)
CO2 SERPL-SCNC: 21 MMOL/L — LOW (ref 22–31)
CO2 SERPL-SCNC: 21 MMOL/L — LOW (ref 22–31)
CREAT SERPL-MCNC: 1.12 MG/DL — SIGNIFICANT CHANGE UP (ref 0.5–1.3)
CREAT SERPL-MCNC: 1.12 MG/DL — SIGNIFICANT CHANGE UP (ref 0.5–1.3)
EGFR: 69 ML/MIN/1.73M2 — SIGNIFICANT CHANGE UP
EGFR: 69 ML/MIN/1.73M2 — SIGNIFICANT CHANGE UP
EOSINOPHIL # BLD AUTO: 0.43 K/UL — SIGNIFICANT CHANGE UP (ref 0–0.5)
EOSINOPHIL # BLD AUTO: 0.43 K/UL — SIGNIFICANT CHANGE UP (ref 0–0.5)
EOSINOPHIL NFR BLD AUTO: 5.9 % — SIGNIFICANT CHANGE UP (ref 0–6)
EOSINOPHIL NFR BLD AUTO: 5.9 % — SIGNIFICANT CHANGE UP (ref 0–6)
GLUCOSE SERPL-MCNC: 103 MG/DL — HIGH (ref 70–99)
GLUCOSE SERPL-MCNC: 103 MG/DL — HIGH (ref 70–99)
HCT VFR BLD CALC: 43 % — SIGNIFICANT CHANGE UP (ref 39–50)
HCT VFR BLD CALC: 43 % — SIGNIFICANT CHANGE UP (ref 39–50)
HGB BLD-MCNC: 14.1 G/DL — SIGNIFICANT CHANGE UP (ref 13–17)
HGB BLD-MCNC: 14.1 G/DL — SIGNIFICANT CHANGE UP (ref 13–17)
IMM GRANULOCYTES NFR BLD AUTO: 0.3 % — SIGNIFICANT CHANGE UP (ref 0–0.9)
IMM GRANULOCYTES NFR BLD AUTO: 0.3 % — SIGNIFICANT CHANGE UP (ref 0–0.9)
LYMPHOCYTES # BLD AUTO: 2.2 K/UL — SIGNIFICANT CHANGE UP (ref 1–3.3)
LYMPHOCYTES # BLD AUTO: 2.2 K/UL — SIGNIFICANT CHANGE UP (ref 1–3.3)
LYMPHOCYTES # BLD AUTO: 30.1 % — SIGNIFICANT CHANGE UP (ref 13–44)
LYMPHOCYTES # BLD AUTO: 30.1 % — SIGNIFICANT CHANGE UP (ref 13–44)
MCHC RBC-ENTMCNC: 28 PG — SIGNIFICANT CHANGE UP (ref 27–34)
MCHC RBC-ENTMCNC: 28 PG — SIGNIFICANT CHANGE UP (ref 27–34)
MCHC RBC-ENTMCNC: 32.8 G/DL — SIGNIFICANT CHANGE UP (ref 32–36)
MCHC RBC-ENTMCNC: 32.8 G/DL — SIGNIFICANT CHANGE UP (ref 32–36)
MCV RBC AUTO: 85.3 FL — SIGNIFICANT CHANGE UP (ref 80–100)
MCV RBC AUTO: 85.3 FL — SIGNIFICANT CHANGE UP (ref 80–100)
MONOCYTES # BLD AUTO: 0.66 K/UL — SIGNIFICANT CHANGE UP (ref 0–0.9)
MONOCYTES # BLD AUTO: 0.66 K/UL — SIGNIFICANT CHANGE UP (ref 0–0.9)
MONOCYTES NFR BLD AUTO: 9 % — SIGNIFICANT CHANGE UP (ref 2–14)
MONOCYTES NFR BLD AUTO: 9 % — SIGNIFICANT CHANGE UP (ref 2–14)
NEUTROPHILS # BLD AUTO: 3.94 K/UL — SIGNIFICANT CHANGE UP (ref 1.8–7.4)
NEUTROPHILS # BLD AUTO: 3.94 K/UL — SIGNIFICANT CHANGE UP (ref 1.8–7.4)
NEUTROPHILS NFR BLD AUTO: 54 % — SIGNIFICANT CHANGE UP (ref 43–77)
NEUTROPHILS NFR BLD AUTO: 54 % — SIGNIFICANT CHANGE UP (ref 43–77)
NRBC # BLD: 0 /100 WBCS — SIGNIFICANT CHANGE UP (ref 0–0)
NRBC # BLD: 0 /100 WBCS — SIGNIFICANT CHANGE UP (ref 0–0)
PLATELET # BLD AUTO: 180 K/UL — SIGNIFICANT CHANGE UP (ref 150–400)
PLATELET # BLD AUTO: 180 K/UL — SIGNIFICANT CHANGE UP (ref 150–400)
POTASSIUM SERPL-MCNC: 4.3 MMOL/L — SIGNIFICANT CHANGE UP (ref 3.5–5.3)
POTASSIUM SERPL-MCNC: 4.3 MMOL/L — SIGNIFICANT CHANGE UP (ref 3.5–5.3)
POTASSIUM SERPL-SCNC: 4.3 MMOL/L — SIGNIFICANT CHANGE UP (ref 3.5–5.3)
POTASSIUM SERPL-SCNC: 4.3 MMOL/L — SIGNIFICANT CHANGE UP (ref 3.5–5.3)
PROT SERPL-MCNC: 6.7 G/DL — SIGNIFICANT CHANGE UP (ref 6–8.3)
PROT SERPL-MCNC: 6.7 G/DL — SIGNIFICANT CHANGE UP (ref 6–8.3)
RBC # BLD: 5.04 M/UL — SIGNIFICANT CHANGE UP (ref 4.2–5.8)
RBC # BLD: 5.04 M/UL — SIGNIFICANT CHANGE UP (ref 4.2–5.8)
RBC # FLD: 13 % — SIGNIFICANT CHANGE UP (ref 10.3–14.5)
RBC # FLD: 13 % — SIGNIFICANT CHANGE UP (ref 10.3–14.5)
SODIUM SERPL-SCNC: 140 MMOL/L — SIGNIFICANT CHANGE UP (ref 135–145)
SODIUM SERPL-SCNC: 140 MMOL/L — SIGNIFICANT CHANGE UP (ref 135–145)
T4 FREE SERPL-MCNC: 1.3 NG/DL — SIGNIFICANT CHANGE UP (ref 0.9–1.8)
T4 FREE SERPL-MCNC: 1.3 NG/DL — SIGNIFICANT CHANGE UP (ref 0.9–1.8)
TSH SERPL-MCNC: 4.53 UIU/ML — HIGH (ref 0.27–4.2)
TSH SERPL-MCNC: 4.53 UIU/ML — HIGH (ref 0.27–4.2)
WBC # BLD: 7.3 K/UL — SIGNIFICANT CHANGE UP (ref 3.8–10.5)
WBC # BLD: 7.3 K/UL — SIGNIFICANT CHANGE UP (ref 3.8–10.5)
WBC # FLD AUTO: 7.3 K/UL — SIGNIFICANT CHANGE UP (ref 3.8–10.5)
WBC # FLD AUTO: 7.3 K/UL — SIGNIFICANT CHANGE UP (ref 3.8–10.5)

## 2024-01-10 PROCEDURE — 99214 OFFICE O/P EST MOD 30 MIN: CPT

## 2024-01-10 RX ORDER — BACILLUS CALMETTE-GUERIN 50 MG/50ML
50 POWDER, FOR SUSPENSION INTRAVESICAL
Refills: 0 | Status: COMPLETED | COMMUNITY
Start: 2023-12-07

## 2024-01-10 RX ORDER — BACILLUS CALMETTE-GUERIN 50 MG/50ML
50 POWDER, FOR SUSPENSION INTRAVESICAL
Refills: 0 | Status: COMPLETED | COMMUNITY
Start: 2023-12-14

## 2024-01-10 NOTE — PHYSICAL EXAM
[Fully active, able to carry on all pre-disease performance without restriction] : Status 0 - Fully active, able to carry on all pre-disease performance without restriction [Normal] : affect appropriate [de-identified] : anicteric  [de-identified] : supple, FROM  [de-identified] : no edema

## 2024-01-10 NOTE — REVIEW OF SYSTEMS
[Fatigue] : fatigue [SOB on Exertion] : shortness of breath during exertion [Diarrhea: Grade 0] : Diarrhea: Grade 0 [Joint Pain] : joint pain [Fever] : no fever [Chills] : no chills [Eye Pain] : no eye pain [Vision Problems] : no vision problems [Chest Pain] : no chest pain [Palpitations] : no palpitations [Lower Ext Edema] : no lower extremity edema [Shortness Of Breath] : no shortness of breath [Cough] : no cough [Abdominal Pain] : no abdominal pain [Vomiting] : no vomiting [Constipation] : no constipation [Dysuria] : no dysuria [Muscle Pain] : no muscle pain [Skin Rash] : no skin rash [Dizziness] : no dizziness [Difficulty Walking] : no difficulty walking [Easy Bleeding] : no tendency for easy bleeding

## 2024-01-10 NOTE — ASSESSMENT
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 Patient was randomized to Combo therapy Arm B-2: BCG (full maintenance) and pembrolizumab. Started pembrlizumab on 8/23/23  [facility] - On pembrolizumab 400mg q 6 weeks, cycle 4 held 11/1/5/23 in the setting of proteinuria, resumed 12/1/23, proceed with treatment today    - Continue to follow with Dr. Mccrary / [facility], most recent installation 12/14/23  - cystoscopy last on 11/9/23  - reviewed labs from 1/9/24   Proteinuria - UA 11/1 and 11/14, 11/21, 11/28 with protein 300mg/dL - Cr 12/1 = 1.10 - pt evaluated by Nephrology per Dr. Espinosa's note "increasing protein on the UA has only been noted in the context of microscopic hematuria/Leukocyturia. Also with complain of passage of blood clots in the urine. This may very well be a confounding factor. It is also very less likely to develop ICPI-related glomerulopathy so early after ICPI exposure and in the absence of ELADIO". "Inflammatory markers in normal range. Urine albumin /creatinine is in normal range. His urinary findings are unlikely to be ICI associated nephritis/glomerulonephritis. This is all likely urological."   Hypophosphatemia  - 1/9/24 Phos = 2.2, encouraged pt to eat phosphate rich foods, will repeat in 3 weeks   AEs: - Fatigue - grade 1 - UTI/dysuria - grade 1 - Hypophosphatemia - grade 1   Instructed to contact our office with any new/worsening symptoms. Patient educated regarding plan of care, all questions/concerns addressed to the best of my abilities and patient's apparent satisfaction.

## 2024-01-11 ENCOUNTER — TRANSCRIPTION ENCOUNTER (OUTPATIENT)
Age: 74
End: 2024-01-11

## 2024-01-11 DIAGNOSIS — Z51.11 ENCOUNTER FOR ANTINEOPLASTIC CHEMOTHERAPY: ICD-10-CM

## 2024-01-11 DIAGNOSIS — Z00.6 ENCOUNTER FOR EXAMINATION FOR NORMAL COMPARISON AND CONTROL IN CLINICAL RESEARCH PROGRAM: ICD-10-CM

## 2024-01-12 ENCOUNTER — TRANSCRIPTION ENCOUNTER (OUTPATIENT)
Age: 74
End: 2024-01-12

## 2024-01-26 ENCOUNTER — TRANSCRIPTION ENCOUNTER (OUTPATIENT)
Age: 74
End: 2024-01-26

## 2024-02-06 ENCOUNTER — APPOINTMENT (OUTPATIENT)
Dept: UROLOGY | Facility: CLINIC | Age: 74
End: 2024-02-06

## 2024-02-19 NOTE — ED ADULT NURSE NOTE - CAS DISCH ACCOMP BY
Medicare Wellness Visit  Plan for Preventive Care    A good way for you to stay healthy is to use preventive care.  Medicare covers many services that can help you stay healthy.* The goal of these services is to find any health problems as quickly as possible. Finding problems early can help make them easier to treat.  Your personal plan below lists the services you may need and when they are due.      Health Maintenance Summary       Shingles Vaccine (1 of 2)  Overdue - never done    Hepatitis B Vaccine (For Physician/APC Discussion) (1 of 3 - Risk 3-dose series)  Overdue - never done    Colorectal Cancer Risk - Colonoscopy (Every 5 Years)  Overdue since 2/1/2022    COVID-19 Vaccine (6 - 2023-24 season)  Overdue since 9/1/2023    Medicare Advantage- Medicare Wellness Visit (Yearly - January to December)  Due since 1/1/2024    Pneumococcal Vaccine 65+ (1 of 2 - PCV)  Postponed until 3/29/2024    Influenza Vaccine (1)  Postponed until 6/30/2024    DM/CKD Microalbumin (Yearly)  Next due on 12/28/2024    DM/CKD GFR (Yearly)  Next due on 2/12/2025    Depression Screening (Yearly)  Next due on 2/14/2025    Breast Cancer Screening (Every 2 Years)  Next due on 11/28/2025    DTaP/Tdap/Td Vaccine (3 - Td or Tdap)  Next due on 10/29/2029    Osteoporosis Screening   Completed    Hepatitis C Screening   Completed    Meningococcal Vaccine   Aged Out    HPV Vaccine   Aged Out             Preventive Care for Women and Men    Heart Screenings (Cardiovascular):  Blood tests are used to check your cholesterol, lipid and triglyceride levels. High levels can increase your risk for heart disease and stroke. High levels can be treated with medications, diet and exercise. Lowering your levels can help keep your heart and blood vessels healthy.  Your provider will order these tests if they are needed.    An ultrasound is done to see if you have an abdominal aortic aneurysm (AAA).  This is an enlargement of one of the main blood vessels  that delivers blood to the body.   In the [place], 9,000 deaths are caused by AAA.  You may not even know you have this problem and as many as 1 in 3 people will have a serious problem if it is not treated.  Early diagnosis allows for more effective treatment and cure.  If you have a family history of AAA or are a male age 65-75 who has smoked, you are at higher risk of an AAA.  Your provider can order this test, if needed.    Colorectal Screening:  There are many tests that are used to check for cancer of your colon and rectum. You and your provider should discuss what test is best for you and when to have it done.  Options include:  Screening Colonoscopy: exam of the entire colon, seen through a flexible lighted tube.  Flexible Sigmoidoscopy: exam of the last third (sigmoid portion) of the colon and rectum, seen through a flexible lighted tube.  Cologuard DNA stool test: a sample of your stool is used to screen for cancer and unseen blood in your stool.  Fecal Occult Blood Test: a sample of your stool is studied to find any unseen blood    Flu Shot:  An immunization that helps to prevent influenza (the flu). You should get this every year. The best time to get the shot is in the fall.    Pneumococcal Shot:  Vaccines help prevent pneumococcal disease, which is any type of illness caused by Streptococcus pneumoniae bacteria. There are two kinds of pneumococcal vaccines available in the [place]:   Pneumococcal conjugate vaccines (PCV20 or [id number]®)  Pneumococcal polysaccharide vaccine (PPSV23 or [id number]®)  For those who have never received any pneumococcal conjugate vaccine, CDC recommends PVC20 for adults 65 years or older and adults 19 through 64 years old with certain medical conditions or risk factors.   For those who have previously received PCV13, this should be followed by a dose of PPSV23.     Hepatitis B Shot:  An immunization that helps to protect people from getting Hepatitis B. Hepatitis  B is a virus that spreads through contact with infected blood or body fluids. Many people with the virus do not have symptoms.  The virus can lead to serious problems, such as liver disease. Some people are at higher risk than others. Your doctor will tell you if you need this shot.     Diabetes Screening:  A test to measure sugar (glucose) in your blood is called a fasting blood sugar. Fasting means you cannot have food or drink for at least 8 hours before the test. This test can detect diabetes long before you may notice symptoms.    Glaucoma Screening:  Glaucoma screening is performed by your eye doctor. The test measures the fluid pressure inside your eyes to determine if you have glaucoma.     Hepatitis C Screening:  A blood test to see if you have the hepatitis C virus.  Hepatitis C attacks the liver and is a major cause of chronic liver disease.  Medicare will cover a single screening for all adults born between 1945 & 1965, or high risk patients (people who have injected illegal drugs or people who have had blood transfusions).  High risk patients who continue to inject illegal drugs can be screened for Hepatitis C every year.    Smoking and Tobacco-Use Cessation Counseling:  Tobacco is the single greatest cause of disease and early death in our country today. Medication and counseling together can increase a person’s chance of quitting for good.   Medicare covers two quitting attempts per year, with four counseling sessions per attempt (eight sessions in a 12 month period)    Preventive Screening tests for Women    Screening Mammograms and Breast Exams:  An x-ray of your breasts to check for breast cancer before you or your doctor may be able to feel it.  If breast cancer is found early it can usually be treated with success.    Pelvic Exams and Pap Tests:  An exam to check for cervical and vaginal cancer. A Pap test is a lab test in which cells are taken from your cervix and sent to the lab to look for  signs of cervical cancer. If cancer of the cervix is found early, chances for a cure are good. Testing can generally end at age 65, or if a woman has a hysterectomy for a benign condition. Your provider may recommend more frequent testing if certain abnormal results are found.    Bone Mass Measurements:  A painless x-ray of your bone density to see if you are at risk for a broken bone. Bone density refers to the thickness of bones or how tightly the bone tissue is packed.    Preventive Screening tests for Men    Prostate Screening:  Should you have a prostate cancer test (PSA)?  It is up to you to decide if you want a prostate cancer test. Talk to your clinician to find out if the test is right for you.  Things for you to consider and talk about should include:  Benefits and harms of the test  Your family history  How your race/ethnicity may influence the test  If the test may impact other medical conditions you have  Your values on screenings and treatments    *Medicare pays for many preventive services to keep you healthy. For some of these services, you might have to pay a deductible, coinsurance, and / or copayment.  The amounts vary depending on the type of services you need and the kind of Medicare health plan you have.    For further details on screenings offered by Medicare please visit: https://www.medicare.gov/coverage/preventive-screening-services      Spouse/Self

## 2024-02-20 ENCOUNTER — NON-APPOINTMENT (OUTPATIENT)
Age: 74
End: 2024-02-20

## 2024-02-20 ENCOUNTER — APPOINTMENT (OUTPATIENT)
Dept: HEMATOLOGY ONCOLOGY | Facility: CLINIC | Age: 74
End: 2024-02-20

## 2024-02-21 ENCOUNTER — APPOINTMENT (OUTPATIENT)
Dept: HEMATOLOGY ONCOLOGY | Facility: CLINIC | Age: 74
End: 2024-02-21
Payer: MEDICARE

## 2024-02-21 ENCOUNTER — APPOINTMENT (OUTPATIENT)
Dept: INFUSION THERAPY | Facility: HOSPITAL | Age: 74
End: 2024-02-21

## 2024-02-21 ENCOUNTER — NON-APPOINTMENT (OUTPATIENT)
Age: 74
End: 2024-02-21

## 2024-02-21 VITALS
OXYGEN SATURATION: 99 % | DIASTOLIC BLOOD PRESSURE: 63 MMHG | HEART RATE: 82 BPM | SYSTOLIC BLOOD PRESSURE: 128 MMHG | BODY MASS INDEX: 33.79 KG/M2 | TEMPERATURE: 97 F | WEIGHT: 219.1 LBS | RESPIRATION RATE: 18 BRPM

## 2024-02-21 DIAGNOSIS — Z29.89 ENCOUNTER. FOR OTHER SPECIFIED PROPHYLACTIC MEASURES: ICD-10-CM

## 2024-02-21 DIAGNOSIS — Z00.6 ENCOUNTER FOR EXAMINATION FOR NORMAL COMPARISON AND CONTROL IN CLINICAL RESEARCH PROGRAM: ICD-10-CM

## 2024-02-21 PROCEDURE — 99214 OFFICE O/P EST MOD 30 MIN: CPT

## 2024-02-22 RX ORDER — LEVOTHYROXINE SODIUM 0.09 MG/1
88 TABLET ORAL
Refills: 0 | Status: ACTIVE | COMMUNITY

## 2024-02-23 ENCOUNTER — OUTPATIENT (OUTPATIENT)
Dept: OUTPATIENT SERVICES | Facility: HOSPITAL | Age: 74
LOS: 1 days | End: 2024-02-23

## 2024-02-23 ENCOUNTER — NON-APPOINTMENT (OUTPATIENT)
Age: 74
End: 2024-02-23

## 2024-02-23 VITALS
OXYGEN SATURATION: 96 % | WEIGHT: 218.92 LBS | HEART RATE: 72 BPM | SYSTOLIC BLOOD PRESSURE: 118 MMHG | RESPIRATION RATE: 15 BRPM | DIASTOLIC BLOOD PRESSURE: 72 MMHG | HEIGHT: 68.5 IN | TEMPERATURE: 98 F

## 2024-02-23 DIAGNOSIS — Z90.49 ACQUIRED ABSENCE OF OTHER SPECIFIED PARTS OF DIGESTIVE TRACT: Chronic | ICD-10-CM

## 2024-02-23 DIAGNOSIS — C67.9 MALIGNANT NEOPLASM OF BLADDER, UNSPECIFIED: ICD-10-CM

## 2024-02-23 DIAGNOSIS — Z98.890 OTHER SPECIFIED POSTPROCEDURAL STATES: Chronic | ICD-10-CM

## 2024-02-23 DIAGNOSIS — Z98.49 CATARACT EXTRACTION STATUS, UNSPECIFIED EYE: Chronic | ICD-10-CM

## 2024-02-23 PROBLEM — Z29.89 IMMUNOTHERAPY: Status: ACTIVE | Noted: 2023-08-23

## 2024-02-23 PROBLEM — Z00.6 CLINICAL TRIAL PARTICIPANT: Status: ACTIVE | Noted: 2023-08-23

## 2024-02-23 NOTE — REVIEW OF SYSTEMS
[Fatigue] : fatigue [SOB on Exertion] : shortness of breath during exertion [Diarrhea: Grade 0] : Diarrhea: Grade 0 [Dysuria] : no dysuria [Joint Pain] : joint pain [Fever] : no fever [Chills] : no chills [Eye Pain] : no eye pain [Vision Problems] : no vision problems [Chest Pain] : no chest pain [Palpitations] : no palpitations [Lower Ext Edema] : no lower extremity edema [Shortness Of Breath] : no shortness of breath [Abdominal Pain] : no abdominal pain [Cough] : no cough [Vomiting] : no vomiting [Constipation] : no constipation [Muscle Pain] : no muscle pain [Skin Rash] : no skin rash [Dizziness] : no dizziness [Difficulty Walking] : no difficulty walking [Easy Bleeding] : no tendency for easy bleeding [FreeTextEntry8] : urinary sx as above

## 2024-02-23 NOTE — H&P PST ADULT - HISTORY OF PRESENT ILLNESS
73 year old male, presents to PST with pre  op diagnosis of bladder cancer, for preop evaluation prior to scheduled  for cystoscopy, possible uretheral dilation,possible bladder biopsy with Dr Mccrary. Patient with history of bladder cancer - s/p BCG, currently on Keytruda infusions.

## 2024-02-23 NOTE — PHYSICAL EXAM
[Fully active, able to carry on all pre-disease performance without restriction] : Status 0 - Fully active, able to carry on all pre-disease performance without restriction [Normal] : affect appropriate [de-identified] : supple, FROM  [de-identified] : anicteric  [de-identified] : no edema

## 2024-02-23 NOTE — H&P PST ADULT - PROBLEM SELECTOR PLAN 1
Patient is tentatively scheduled  for cystoscopy, possible uretheral dilation, possible bladder biopsy with Dr Mccrary- 02/28/24.    Pre-op instructions provided. Pt given verbal and written instructions with teach back on pepcid. Pt verbalized understanding with return demonstration.    CBC, CMP results in chart   Patient reports of recent urine culture from PCP office - 02/15/24,(  positive for enterobacter) started on ciprofloxacin on 02/21/24.- for 7 days    Patient instructed to take levothyroxine with a sip of water on the morning of procedure. Patient is tentatively scheduled  for cystoscopy, possible uretheral dilation, possible bladder biopsy with Dr Mccrary- 02/28/24.    Pre-op instructions provided. Pt given verbal and written instructions with teach back on pepcid. Pt verbalized understanding with return demonstration.    CBC, CMP results in chart   Patient reports of recent urine culture from PCP office - 02/15/24,(  positive for enterobacter) started on ciprofloxacin on 02/21/24.- for 7 days    Patient instructed to take levothyroxine with a sip of water on the morning of procedure.  Instructed to hold sildenafil 3 days prior to surgery

## 2024-02-23 NOTE — H&P PST ADULT - NS PRO FEM  PAP SMEARS 3YRS
not applicable (Male) Zyclara Counseling:  I discussed with the patient the risks of imiquimod including but not limited to erythema, scaling, itching, weeping, crusting, and pain.  Patient understands that the inflammatory response to imiquimod is variable from person to person and was educated regarded proper titration schedule.  If flu-like symptoms develop, patient knows to discontinue the medication and contact us.

## 2024-02-23 NOTE — ASSESSMENT
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 Patient was randomized to Combo therapy Arm B-2: BCG (full maintenance) and pembrolizumab. Started pembrolizumab on 8/23/23.  Last BCG installation was on 12/14/23, following this pt developed intolerable urinary urgency and dysuria; BCG discontinued.    [facility] - On pembrolizumab 400mg q 6 weeks, cycle 4 held 11/1/5/23 in the setting of proteinuria, resumed 12/1/23, proceed with treatment today 2/21  - Continue to follow with Dr. Mccrary - last BCG installation was on 12/14/23, following this pt developed intolerable urinary urgency and dysuria; BCG discontinued   - cystoscopy due 2/28  - reviewed labs from 2/20   UTI  - pt reports Ucx +Enterobacter colace at PCP, started on Cipro x 7 days, holding keflex - 2/20 UA trace ketones, large blood, moderate LE, positive nitrite, WBC >998, , many bacteria  - f/u with Urology   Proteinuria - UA 11/1 and 11/14, 11/21, 11/28 with protein 300mg/dL; Cr 12/1 = 1.10 - pt evaluated by Nephrology per Dr. Espinosa's note "increasing protein on the UA has only been noted in the context of microscopic hematuria/Leukocyturia. Also with complain of passage of blood clots in the urine. This may very well be a confounding factor. It is also very less likely to develop ICPI-related glomerulopathy so early after ICPI exposure and in the absence of ELADIO". "Inflammatory markers in normal range. Urine albumin /creatinine is in normal range. His urinary findings are unlikely to be ICI associated nephritis/glomerulonephritis. This is all likely urological."  - UA 2/20/24 urine protein = 100, improved from prior   Hypophosphatemia, improved   - Phos 2/20 = 3.0  Hypothyroid  - 2/20 TSH 6.53, T4 1.4  - PCP inc levothyroxine to 88mcg a few days ago; continue to follow with PCP   - Fatigue - grade 1 - UTI/dysuria - grade 2-3 - Hypophosphatemia - grade 1   Instructed to contact our office with any new/worsening symptoms. Patient educated regarding plan of care, all questions/concerns addressed to the best of my abilities and patient's apparent satisfaction.

## 2024-02-23 NOTE — H&P PST ADULT - NSICDXPASTMEDICALHX_GEN_ALL_CORE_FT
PAST MEDICAL HISTORY:  Bladder tumor     BPH (benign prostatic hyperplasia) s/p UroLift    Cervical osteophyte     Cervical radiculopathy     Cervical spinal stenosis     Enterococcus UTI 8/26/2021 treated with ABx--resolved, symptom free now    Epiploic appendagitis ER visit 8/26/2021    History of gross hematuria     [place] (hard of hearing)     Hypothyroid     RBBB

## 2024-02-23 NOTE — H&P PST ADULT - HEIGHT IN FEET
Fwd to Dr. Sharma for review. Please advise.     Cannot refill adderall 10mg NOT on protocol.     Last refill was 2/28/23 for 60 x 0.      Last office visit was 2/1/23 with Dr. Sharma    and next is n/a      Has canceled/no showed 0 times since last seen.   
5

## 2024-02-23 NOTE — H&P PST ADULT - NSICDXPASTSURGICALHX_GEN_ALL_CORE_FT
PAST SURGICAL HISTORY:  H/O cervical discectomy     H/O colonoscopy     H/O transurethral resection of bladder tumor (TURBT)     History of appendectomy lap    S/P cataract surgery     S/P colonoscopic polypectomy     S/P endoscopy     S/P urological surgery

## 2024-02-23 NOTE — H&P PST ADULT - PSYCHIATRIC
Received refill request from [facility] for losartan (COZAAR) 50 MG tablet  Last refill: 8/14/18 #90 R-0  Last office visit: 10/10/2018        Per  protocol medication refilled for a 3 month supply and E-prescribed to pharmacy.       details… normal/normal affect/alert and oriented x3/normal behavior

## 2024-02-23 NOTE — HISTORY OF PRESENT ILLNESS
[Disease: _____________________] : Disease: [unfilled] [T: ___] : T[unfilled] [N: ___] : N[unfilled] [M: ___] : M[unfilled] [AJCC Stage: ____] : AJCC Stage: [unfilled] [de-identified] : Aung Paris is a 72 years old male with history of asymptomatic RBBB, HLD, recurrent UTIs and BPH, and urethral stricture, status post cystoscopy, UroLift procedure in 2018. Patient presented with hematuria in April 2023 and saw Dr. Purvis for evaluation. S/p Cystoscopy, incision of urethral stricture and TURBT 4/25/2023. 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 S/p Cystocopy, re-TURBT 5/30/2023. Path: no residual tumor identified, muscle present and not involved. Dr. Purvis subsequently referred him to Dr. Newell. He was evaluated by Dr. Mccrary and qualified for -292 phase III trial.   7/26/23 CT urogram showed Multiple bladder diverticulum one of which is along the mid left lateral bladder wall demonstrating nodular enhancing thickening similar to prior exam.  Since last TURBT, patient without any hematuria. He reports that he has reduced flow which he thinks is secondary to his strictures. No HA, vision changes, CP, n/v/d/c. Has mild dyspnea on exertion but able tolerate climbing stairs. He reports that appetite is good.   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   8/23/23: Overall, patient is feeling well, does note some fatigue, however remains active. Had recent urinary tract infection, symptoms have resolved. He denies hematuria, dysuria, urgency and frequency. No fevers. His urine is clear. Does note that he does not feel like he completely emptying his bladder, however this has been ongoing. He will be on keflex, while on study. Pt says he takes tylenol twice a day prophylactically for occ joint pain in his lower pack, shoulder and hip, it is not very severe, thinks he may even be able to do without the tylenol. Patient notes shortness of breath with exertion, however his exercise tolerance is more than one mile when walking. Patient denies fever, chills, headache, vision changes, cough, chest pain, palpitations, abdominal pain, nausea/vomiting, diarrhea, constipation, itching, rashes.   9/5/23: Patient seen today for urgent visit, he reports 5 days ago that he began having symptoms of shooting pain in the base of his skull, mostly on the R side, occ on the L. The pain is intermittent and lasts for diff erent intervals of time . The pain has woken him up from sleep, it does not seem to be worsened or associated with particular movements. He also reports sensitivity of his scalp, mainly on the R side as well. He denies pain over his eyes and pressure in sinuses. Denies frontal headache, sensitivity to light, visual changes, dizziness and lightheadedness. He denies congestion, cough and shortness of breath. Denies sick contacts. His throat feels "thick",  "like something is stuck". He has not had difficulty swallowing, has been eating normally. No fevers. He does not recall an episode of "trauma" that may have set this pain off. He denies new paresthesia elsewhere, he has ongoing paresthesia in three fingers in L hand. Patient has been taking ibuprofen 200mg approx every 4 hours and started taking gabapentin 300mg TID on Saturday. He has this Rx from a prior neck surgery, the last time he took gabapentin was in 2022. He thinks that this combination of medication is helping to relive his pain, but does not completely alleviate it. Patient reports he recently was vaccinated for shingles. He denies nausea/vomiting, diarrhea/constipation, dysuria.    10/4/23 right side of skull pain was resolved. He has difficulty urination with history of urinary tract stricture. He had weekly self cath after BCG treatment for total 6 weeks. He denies other symptoms.   11/15/23: Due to urinary symptoms, patient was taken off ppx keflex and methenamine and started on levaquin 11/9 x 10 days. He was experiencing urgency, frequency (even as often as every 5 - 10 minutes) with little production of urine, and extreme discomfort and end urination. Henever felt like adequately emptied and this has progressed since last BCG treatment. Now urgency and frequency are better, was going 6 times at night and  now just 1-2. Now "just a hint of discomfort". Feels about 80% better. He suprapubic discomfort.  No fevers, no shortness of breath or coughing, no itching or rash. Has an area of dry skin that was itchy on R elbow two weeks ago, it is not erythematous. No constipation or diarrhea. Reports chronic joint pain, taking tylenol twice a day. Pt denies fever and chills, no headaches or visual changes. No chest pain or palpitations. L hand 3rd, 4th and 5th finger with  paresthesia from prior surgery. No swelling.  Energy is a bit low, was not able to do much when his urinary urgency and frequency were at worst, now he is feeling improved. Reports okay appetite, has lost 6lbs but attributes this to eating a bit less / healthier   12/1/23: Treatment held on 11/15/23, due to grade II proteinuria, seen by Nephrology and thought unlikely to be immune mediated nephritis. Patient resumed BCG this week on Wednesday. Per Dr. Mccrary pt is continued on keflex ppx and was started on tamsulosin. He reports still having urinary urgency and frequency, however nothing like his symptoms a few weeks ago. He denies visible blood in urine, does report some occasional sloughing tissue in urine. Notes no "pain" with urination but sometimes a mild "discomfort".  Chronic joint pain is stable. Patient denies fever, chills, cough, shortness of breath, chest pain, palpitations, abdominal pain, nausea/vomiting, diarrhea, constipation, itching, rashes, muscle pain, changes in sensation.  1/10/24: Overall patient has been feeling well. He denies extreme fatigue, says "it is nothing more than being old". Appetite is okay, not what it used to be. He takes tylenol for chronic joint pain, specifically on R side when laying on that side for an extended period of time - this pain is not new or changing and pre dates treatment. He says that it took him about two weeks for urinary symptoms to come back to baseline after having last BCG treatment. He experienced extreme urgency and frequency, which dissipated after the first few days and has continued to improve each day, now pretty much back to baseline. He denies burning with urination and blood in urine. Feels like he sees less bubbles in urine than prior. Patient denies fever cough, shortness of breath, chest pain, palpitations, abdominal pain, nausea/vomiting, diarrhea, constipation, rashes, joint pain. [de-identified] : non muscle invasive bladder cancer  [de-identified] :  2/21/24: Since last visit, pt has decided to stop BCG treatments due to intolerable urinary urgency and dysuria. After stopping BCG installations he had improvement in urinary symptoms. He notes that in the last few weeks he has had somewhat increased urinary frequency, says instead of going every 2-3 hours is going every 1.5-2 hours. Nocturia 3-4. No discomfort with urination. No visible blood or debris. No hesitancy or incontinence, does feel as though he may be retaining. He went to get medical clearance for his upcoming cystoscopy and was told he had a urinary tract infection with Enterobacter colace, his PCP put him on 7 days of cipro and he is holding keflex.   Pt recently went away to [place], he had COVID while down there, symptoms were mild and he has fully recovered. He says his appetite and energy are "normal for an old man". He has shortness of breath with exertion, not changed from prior. No shortness of breath at rest. He has no rashes,  says his back is some

## 2024-02-23 NOTE — H&P PST ADULT - GENITOURINARY COMMENTS
pre op diagnosis- bladder cancer patient reports of bladder cancer,- multiple cystoscopies in 2023, recent diagnosis of UTI, urine culture from PCP on 02/15/24, came back positve enterobacter- started on cipro for 7 days- 2/21/24

## 2024-02-27 ENCOUNTER — TRANSCRIPTION ENCOUNTER (OUTPATIENT)
Age: 74
End: 2024-02-27

## 2024-02-28 ENCOUNTER — OUTPATIENT (OUTPATIENT)
Dept: OUTPATIENT SERVICES | Facility: HOSPITAL | Age: 74
LOS: 1 days | Discharge: HOME | End: 2024-02-28
Payer: MEDICARE

## 2024-02-28 ENCOUNTER — APPOINTMENT (OUTPATIENT)
Dept: UROLOGY | Facility: AMBULATORY SURGERY CENTER | Age: 74
End: 2024-02-28

## 2024-02-28 ENCOUNTER — RESULT REVIEW (OUTPATIENT)
Age: 74
End: 2024-02-28

## 2024-02-28 ENCOUNTER — TRANSCRIPTION ENCOUNTER (OUTPATIENT)
Age: 74
End: 2024-02-28

## 2024-02-28 VITALS
HEART RATE: 58 BPM | DIASTOLIC BLOOD PRESSURE: 65 MMHG | SYSTOLIC BLOOD PRESSURE: 110 MMHG | RESPIRATION RATE: 15 BRPM | TEMPERATURE: 97 F | OXYGEN SATURATION: 99 %

## 2024-02-28 VITALS
HEIGHT: 68.5 IN | SYSTOLIC BLOOD PRESSURE: 110 MMHG | TEMPERATURE: 97 F | RESPIRATION RATE: 20 BRPM | HEART RATE: 66 BPM | DIASTOLIC BLOOD PRESSURE: 64 MMHG | OXYGEN SATURATION: 96 % | WEIGHT: 218.92 LBS

## 2024-02-28 DIAGNOSIS — Z98.890 OTHER SPECIFIED POSTPROCEDURAL STATES: Chronic | ICD-10-CM

## 2024-02-28 DIAGNOSIS — Z90.49 ACQUIRED ABSENCE OF OTHER SPECIFIED PARTS OF DIGESTIVE TRACT: Chronic | ICD-10-CM

## 2024-02-28 DIAGNOSIS — Z98.49 CATARACT EXTRACTION STATUS, UNSPECIFIED EYE: Chronic | ICD-10-CM

## 2024-02-28 DIAGNOSIS — C67.9 MALIGNANT NEOPLASM OF BLADDER, UNSPECIFIED: ICD-10-CM

## 2024-02-28 PROCEDURE — 52234 CYSTOSCOPY AND TREATMENT: CPT

## 2024-02-28 PROCEDURE — 88307 TISSUE EXAM BY PATHOLOGIST: CPT | Mod: 26

## 2024-02-28 DEVICE — GUIDEWIRE SENSOR DUAL-FLEX NITINOL STRAIGHT .038" X 150CM: Type: IMPLANTABLE DEVICE | Status: FUNCTIONAL

## 2024-02-28 RX ORDER — CEPHALEXIN 500 MG
1 CAPSULE ORAL
Refills: 0 | DISCHARGE

## 2024-02-28 RX ORDER — CIPROFLOXACIN LACTATE 400MG/40ML
1 VIAL (ML) INTRAVENOUS
Refills: 0 | DISCHARGE

## 2024-02-28 RX ORDER — CIPROFLOXACIN LACTATE 400MG/40ML
1 VIAL (ML) INTRAVENOUS
Qty: 6 | Refills: 0
Start: 2024-02-28 | End: 2024-03-01

## 2024-02-28 NOTE — BRIEF OPERATIVE NOTE - NSICDXBRIEFPROCEDURE_GEN_ALL_CORE_FT
PROCEDURES:  Bladder biopsy 28-Feb-2024 09:26:25  Godwin Chu  Cystoscopy with urethral dilation 28-Feb-2024 09:26:34  Godwin Chu

## 2024-02-28 NOTE — ASU DISCHARGE PLAN (ADULT/PEDIATRIC) - ASU DC SPECIAL INSTRUCTIONSFT
CATHETER: You are sent home with a esteban catheter. If you still have a catheter, the nurses will review instructions and care before you go home. You may remove your catheter yourself on saturday 3/2 or come to the office on monday 3/5 to have your catheter removed.    GENERAL: It is common to have blood in your urine after your procedure. It may be pink or even red; inform your doctor if you have a significant amount of clot in the urine or if you are unable to void at all or if your catheter stops draining. The urine may clear and then become bloody again especially as you are more physically active. It is not uncommon to have some burning when you urinate, this will gradually improve. With a catheter in place, it is not uncommon to have occasional leakage or urine or blood around the catheter. Please call your urologist if this is excessive and/or the urine is not draining through the catheter into the bag.  BATHING: You may shower or bathe. If going home with esteban, shower only until catheter is removed.  DIET: You may resume your regular diet and regular medication regimen.  PAIN: You may take Tylenol (acetaminophen) 650-975mg and/or Motrin (ibuprofen) 400-600mg, both available over the counter, for pain every 6 hours as needed. Do not exceed 4000mg of Tylenol (acetaminophen) daily. You may alternate these medications such that you take one or the other every 3 hours for around the clock pain coverage.  ANTIBIOTICS: You have been given a prescription for an antibiotic, please take this medication as instructed and be sure to complete the entire course.  STOOL SOFTENERS: Do not allow yourself to become constipated as straining may cause bleeding. Take stool softeners or a laxative (ex. Miralax, Colace, Senokot, ExLax, etc), available over the counter, if needed.  ACTIVITY: No heavy lifting or strenuous exercise until you are evaluated at your post-operative appointment. Otherwise, you may return to your usual level of physical activity.  FOLLOW-UP: If you did not already schedule your post-operative appointment, please call your urologist to schedule a follow-up appointment. Dr. Mccrary will call you with the pathology results and when to follow-up  CALL YOUR UROLOGIST IF: You have any bleeding that does not stop, inability to void >8 hours, fever over 100.4 F, chills, persistent nausea/vomiting, changes in your incision concerning for infection, or if your pain is not controlled on your discharge pain medications.

## 2024-03-01 LAB
CULTURE RESULTS: NO GROWTH — SIGNIFICANT CHANGE UP
SPECIMEN SOURCE: SIGNIFICANT CHANGE UP

## 2024-03-04 LAB — SURGICAL PATHOLOGY STUDY: SIGNIFICANT CHANGE UP

## 2024-03-06 RX ORDER — CEPHALEXIN 250 MG/1
250 TABLET ORAL
Qty: 90 | Refills: 3 | Status: ACTIVE | COMMUNITY
Start: 2023-08-22 | End: 1900-01-01

## 2024-03-11 ENCOUNTER — NON-APPOINTMENT (OUTPATIENT)
Age: 74
End: 2024-03-11

## 2024-03-14 ENCOUNTER — OUTPATIENT (OUTPATIENT)
Dept: OUTPATIENT SERVICES | Facility: HOSPITAL | Age: 74
LOS: 1 days | End: 2024-03-14
Payer: MEDICARE

## 2024-03-14 ENCOUNTER — APPOINTMENT (OUTPATIENT)
Dept: CT IMAGING | Facility: CLINIC | Age: 74
End: 2024-03-14
Payer: MEDICARE

## 2024-03-14 DIAGNOSIS — Z98.890 OTHER SPECIFIED POSTPROCEDURAL STATES: Chronic | ICD-10-CM

## 2024-03-14 DIAGNOSIS — C67.9 MALIGNANT NEOPLASM OF BLADDER, UNSPECIFIED: ICD-10-CM

## 2024-03-14 DIAGNOSIS — Z98.49 CATARACT EXTRACTION STATUS, UNSPECIFIED EYE: Chronic | ICD-10-CM

## 2024-03-14 DIAGNOSIS — Z90.49 ACQUIRED ABSENCE OF OTHER SPECIFIED PARTS OF DIGESTIVE TRACT: Chronic | ICD-10-CM

## 2024-03-14 LAB
APPEARANCE: ABNORMAL
BACTERIA UR CULT: NORMAL
BACTERIA: NEGATIVE /HPF
BILIRUBIN URINE: NEGATIVE
BLOOD URINE: ABNORMAL
CAST: 0 /LPF
COLOR: YELLOW
EPITHELIAL CELLS: 2 /HPF
GLUCOSE QUALITATIVE U: NEGATIVE MG/DL
KETONES URINE: NEGATIVE MG/DL
LEUKOCYTE ESTERASE URINE: ABNORMAL
MICROSCOPIC-UA: NORMAL
NITRITE URINE: NEGATIVE
PH URINE: 5.5
PROTEIN URINE: 100 MG/DL
RED BLOOD CELLS URINE: 171 /HPF
REVIEW: NORMAL
SPECIFIC GRAVITY URINE: 1.02
UROBILINOGEN URINE: 0.2 MG/DL
WHITE BLOOD CELLS URINE: 70 /HPF

## 2024-03-14 PROCEDURE — 74178 CT ABD&PLV WO CNTR FLWD CNTR: CPT | Mod: 26,MH

## 2024-03-14 PROCEDURE — 74178 CT ABD&PLV WO CNTR FLWD CNTR: CPT

## 2024-03-15 ENCOUNTER — NON-APPOINTMENT (OUTPATIENT)
Age: 74
End: 2024-03-15

## 2024-03-18 ENCOUNTER — NON-APPOINTMENT (OUTPATIENT)
Age: 74
End: 2024-03-18

## 2024-03-26 ENCOUNTER — OUTPATIENT (OUTPATIENT)
Dept: OUTPATIENT SERVICES | Facility: HOSPITAL | Age: 74
LOS: 1 days | Discharge: HOME | End: 2024-03-26

## 2024-03-26 DIAGNOSIS — Z98.890 OTHER SPECIFIED POSTPROCEDURAL STATES: Chronic | ICD-10-CM

## 2024-03-26 DIAGNOSIS — Z98.49 CATARACT EXTRACTION STATUS, UNSPECIFIED EYE: Chronic | ICD-10-CM

## 2024-03-26 DIAGNOSIS — C67.9 MALIGNANT NEOPLASM OF BLADDER, UNSPECIFIED: ICD-10-CM

## 2024-03-26 DIAGNOSIS — Z90.49 ACQUIRED ABSENCE OF OTHER SPECIFIED PARTS OF DIGESTIVE TRACT: Chronic | ICD-10-CM

## 2024-04-03 ENCOUNTER — APPOINTMENT (OUTPATIENT)
Dept: HEMATOLOGY ONCOLOGY | Facility: CLINIC | Age: 74
End: 2024-04-03

## 2024-04-04 ENCOUNTER — APPOINTMENT (OUTPATIENT)
Dept: HEMATOLOGY ONCOLOGY | Facility: CLINIC | Age: 74
End: 2024-04-04

## 2024-04-05 ENCOUNTER — APPOINTMENT (OUTPATIENT)
Dept: INFUSION THERAPY | Facility: HOSPITAL | Age: 74
End: 2024-04-05

## 2024-04-05 ENCOUNTER — APPOINTMENT (OUTPATIENT)
Dept: HEMATOLOGY ONCOLOGY | Facility: CLINIC | Age: 74
End: 2024-04-05

## 2024-04-09 ENCOUNTER — APPOINTMENT (OUTPATIENT)
Age: 74
End: 2024-04-09

## 2024-04-09 ENCOUNTER — APPOINTMENT (OUTPATIENT)
Dept: UROLOGY | Facility: CLINIC | Age: 74
End: 2024-04-09
Payer: MEDICARE

## 2024-04-09 VITALS — SYSTOLIC BLOOD PRESSURE: 133 MMHG | HEART RATE: 86 BPM | DIASTOLIC BLOOD PRESSURE: 77 MMHG

## 2024-04-09 DIAGNOSIS — N40.1 BENIGN PROSTATIC HYPERPLASIA WITH LOWER URINARY TRACT SYMPMS: ICD-10-CM

## 2024-04-09 DIAGNOSIS — N35.912 UNSPECIFIED BULBOUS URETHRAL STRICTURE, MALE: ICD-10-CM

## 2024-04-09 DIAGNOSIS — N13.8 BENIGN PROSTATIC HYPERPLASIA WITH LOWER URINARY TRACT SYMPMS: ICD-10-CM

## 2024-04-09 DIAGNOSIS — N35.919 UNSPECIFIED URETHRAL STRICTURE, MALE, UNSPECIFIED SITE: ICD-10-CM

## 2024-04-09 PROCEDURE — G2211 COMPLEX E/M VISIT ADD ON: CPT

## 2024-04-09 PROCEDURE — 99214 OFFICE O/P EST MOD 30 MIN: CPT

## 2024-04-09 NOTE — ASSESSMENT
[FreeTextEntry1] : Patient is a 72 yo M who presents for LUTS and urethral stricture. He has a h/o recurrent HG bladder cancer.  Most recent dilation/bladder hx in 2/28/24 He is starting Gemcitabine BLI x6 wks starting tomr.  I had a discussion with the pt about his condition and possible treatment options for his urethral stricture.  We discussed repeat endoscopic management such as internal urethrotomy, dilation with/without intermittent self dilation and possibly with Optilume, and urethroplasty.  Discussed the typical reported success rates with each treatment option.  Discussed given number of endoscopic procedures would recommend urethroplasty as a more definitive option.  Although Optilume is a potential good option. D/w pt that Optilume is a newer device/technology of drug impregnated balloon dilation, and there is uncertain durability beyond a few years with smaller numbers of patients.  Also d/w pt that while overall safety profile seems good, there may be uncertain risks including penile pain.  Also d/w pt that his clinical scenario is not exactly matching to the original clinical trial.  Reviewed postop instructions and abstinence recommendations.  Also d/w pt unclear outcomes if urethroplasty is required after Optilume.  Discussed that urethroplasty has the longest durability and highest success rates of 80-90%.  Discussed that pending work up and stricture characteristics would perform either an anastomotic urethroplasty or if the stricture was found to be more extensive would perform a buccal mucosa substitution grafting procedure.  Discussed risks/benefits/alternatives of urethroplasty surgery.  Risks including but not limited to bleeding, infection, penile curvature, erectile dysfunction, recurrence of stricture, poor wound healing discussed.  Discussed typical postoperative course and need for catheter for 2-4 weeks. Discussed with pt dietary restrictions if buccal mucosa harvested.  Pt wishes to proceed with urethroplasty surgery at this time, first will evaluate with cysto, RUG.  He will require longitudinal care for his chronic and severe urologic conditions.

## 2024-04-09 NOTE — HISTORY OF PRESENT ILLNESS
[FreeTextEntry1] : Patient is a 73-year-old male with bladder cancer who presents for urethral stricture.   HPI History of urolift in May 2017,  status post dilation in Nov 2018 [facility]. Subsequently he noticed weaker slower stream. History of bladder cancer, status post TURBT in 4/2023 and 2/28/24. Was on clinical trial w BCG and pembr but could not tolerate BCG and also recurred. He will start BLI chemotherapy/gemcitabine instillations tomorrow.  History of frequent UTI in the past several years. He tried methenamine/Vit C with improvement in symptoms, not able to tolerate further as maintenance due to dysuria/frequency/nocturia.   After bladder resection, He subsequently noticed urinary frequency, urgency, cystoscopy with dilation x2 by Dr. Mccrary within a few weeks.  Initially right after dilations, FOS, frequency and nocturia were better, but only lasted for a few weeks/month.  Currently he reports urinary frequency every 1-2 hours, intermittent stream, hesitancy, nocturia x 3-5, strain to empty, takes longer to empty, incomplete empty sensation.  He denied any gross hematuria, dysuria.  Pt currently on Cephalexin 250mg for UTI prophylaxis.

## 2024-04-09 NOTE — PHYSICAL EXAM
[General Appearance - Well Developed] : well developed [General Appearance - Well Nourished] : well nourished [Normal Appearance] : normal appearance [Well Groomed] : well groomed [General Appearance - In No Acute Distress] : no acute distress [Urinary Bladder Findings] : the bladder was normal on palpation [Oriented To Time, Place, And Person] : oriented to person, place, and time [Affect] : the affect was normal

## 2024-04-10 ENCOUNTER — NON-APPOINTMENT (OUTPATIENT)
Age: 74
End: 2024-04-10

## 2024-04-10 ENCOUNTER — APPOINTMENT (OUTPATIENT)
Dept: UROLOGY | Facility: CLINIC | Age: 74
End: 2024-04-10
Payer: MEDICARE

## 2024-04-10 ENCOUNTER — OUTPATIENT (OUTPATIENT)
Dept: OUTPATIENT SERVICES | Facility: HOSPITAL | Age: 74
LOS: 1 days | End: 2024-04-10
Payer: MEDICARE

## 2024-04-10 VITALS
RESPIRATION RATE: 16 BRPM | OXYGEN SATURATION: 94 % | DIASTOLIC BLOOD PRESSURE: 76 MMHG | HEART RATE: 72 BPM | TEMPERATURE: 98.3 F | BODY MASS INDEX: 33.47 KG/M2 | WEIGHT: 217 LBS | SYSTOLIC BLOOD PRESSURE: 121 MMHG

## 2024-04-10 VITALS — HEART RATE: 70 BPM | DIASTOLIC BLOOD PRESSURE: 76 MMHG | SYSTOLIC BLOOD PRESSURE: 128 MMHG

## 2024-04-10 DIAGNOSIS — Z98.890 OTHER SPECIFIED POSTPROCEDURAL STATES: Chronic | ICD-10-CM

## 2024-04-10 DIAGNOSIS — Z98.49 CATARACT EXTRACTION STATUS, UNSPECIFIED EYE: Chronic | ICD-10-CM

## 2024-04-10 DIAGNOSIS — Z90.49 ACQUIRED ABSENCE OF OTHER SPECIFIED PARTS OF DIGESTIVE TRACT: Chronic | ICD-10-CM

## 2024-04-10 DIAGNOSIS — R35.0 FREQUENCY OF MICTURITION: ICD-10-CM

## 2024-04-10 PROCEDURE — 51720 TREATMENT OF BLADDER LESION: CPT

## 2024-04-10 RX ORDER — GEMCITABINE 100 MG/ML
2 INJECTION, SOLUTION INTRAVENOUS
Refills: 0 | Status: COMPLETED | OUTPATIENT
Start: 2024-04-10

## 2024-04-10 RX ORDER — GEMCITABINE 2 G/50ML
2 INJECTION, POWDER, LYOPHILIZED, FOR SOLUTION INTRAVENOUS
Qty: 1 | Refills: 0 | Status: COMPLETED | OUTPATIENT
Start: 2024-04-10 | End: 2024-04-10

## 2024-04-10 RX ORDER — GEMCITABINE 38 MG/ML
2 INJECTION, SOLUTION INTRAVENOUS ONCE
Refills: 0 | Status: DISCONTINUED | OUTPATIENT
Start: 2024-04-10 | End: 2024-04-25

## 2024-04-10 RX ADMIN — GEMCITABINE HYDROCHLORIDE 0 GM/20ML: 1 INJECTION, POWDER, LYOPHILIZED, FOR SOLUTION INTRAVENOUS at 00:00

## 2024-04-10 NOTE — HISTORY OF PRESENT ILLNESS
[FreeTextEntry1] : Bladder cancer history: s/p Cystoscopy, incision of urethral stricture and TURBT 4/25/2023. 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 s/p Cystoscopy, re-TURBT 5/30/2023. Path: no residual tumor identified, muscle present and not involved.  Enrolled into [facility] [id number], cohort B: BCG + Pembrolizumab. Induction BCG x 6 8/23/2023 thru 9/28/2023 Cystoscopy 11/9/2023: no recurrence 1st maintenance BCG cycle 11/29/203 thru 12/14/2023. Significant adverse effects from BCG so opted to discontinue further maintenance BCG on 2/14/2024. Continued to receive IV pembrolizumab q3 weeks.  s/p TURBT 2/28/2024, Path: HG TCC, pTa (three tumor sites).  Here for end of study visit. Bladder cancer recurrence now will start 6 week induction of course of intravesical gemcitabine.    CT a/p urogram 3/14/2024: Bladder wall thickening likely secondary to recent procedure.  No upper tract abnormalities, no adenopathy.

## 2024-04-10 NOTE — ASSESSMENT
[FreeTextEntry1] : [facility] PS 0 CT images reviewed. First treatment of intravesical gemcitabine administered today.  Was seen by Dr. Krishna for his urethral stricture disease. Scheduled for urethroplasty in July 2024.

## 2024-04-11 ENCOUNTER — NON-APPOINTMENT (OUTPATIENT)
Age: 74
End: 2024-04-11

## 2024-04-11 DIAGNOSIS — C67.9 MALIGNANT NEOPLASM OF BLADDER, UNSPECIFIED: ICD-10-CM

## 2024-04-17 LAB — BACTERIA UR CULT: ABNORMAL

## 2024-04-17 RX ORDER — LEVOFLOXACIN 500 MG/1
500 TABLET, FILM COATED ORAL DAILY
Qty: 7 | Refills: 0 | Status: COMPLETED | COMMUNITY
Start: 2024-04-17 | End: 2024-04-24

## 2024-04-18 ENCOUNTER — OUTPATIENT (OUTPATIENT)
Dept: OUTPATIENT SERVICES | Facility: HOSPITAL | Age: 74
LOS: 1 days | End: 2024-04-18
Payer: MEDICARE

## 2024-04-18 ENCOUNTER — APPOINTMENT (OUTPATIENT)
Dept: UROLOGY | Facility: CLINIC | Age: 74
End: 2024-04-18
Payer: MEDICARE

## 2024-04-18 VITALS
SYSTOLIC BLOOD PRESSURE: 116 MMHG | HEART RATE: 84 BPM | OXYGEN SATURATION: 98 % | RESPIRATION RATE: 16 BRPM | DIASTOLIC BLOOD PRESSURE: 75 MMHG

## 2024-04-18 DIAGNOSIS — Z98.890 OTHER SPECIFIED POSTPROCEDURAL STATES: Chronic | ICD-10-CM

## 2024-04-18 DIAGNOSIS — R35.0 FREQUENCY OF MICTURITION: ICD-10-CM

## 2024-04-18 DIAGNOSIS — N39.0 URINARY TRACT INFECTION, SITE NOT SPECIFIED: ICD-10-CM

## 2024-04-18 DIAGNOSIS — Z90.49 ACQUIRED ABSENCE OF OTHER SPECIFIED PARTS OF DIGESTIVE TRACT: Chronic | ICD-10-CM

## 2024-04-18 DIAGNOSIS — Z98.49 CATARACT EXTRACTION STATUS, UNSPECIFIED EYE: Chronic | ICD-10-CM

## 2024-04-18 PROCEDURE — G2211 COMPLEX E/M VISIT ADD ON: CPT

## 2024-04-18 PROCEDURE — 99212 OFFICE O/P EST SF 10 MIN: CPT

## 2024-04-18 RX ORDER — GEMCITABINE 38 MG/ML
2 INJECTION, SOLUTION INTRAVENOUS ONCE
Refills: 0 | Status: DISCONTINUED | OUTPATIENT
Start: 2024-04-18 | End: 2024-05-02

## 2024-04-19 NOTE — HISTORY OF PRESENT ILLNESS
Patient and responsible adult given discharge instructions with no questions regarding instructions. Adilia score 19. Pain level 4/10.  Discharged from unit via ambulation. Patient discharged to [facility] with friend..    [Disease: _____________________] : Disease: [unfilled] [T: ___] : T[unfilled] [N: ___] : N[unfilled] [M: ___] : M[unfilled] [AJCC Stage: ____] : AJCC Stage: [unfilled] [de-identified] : Aung Paris is a 72 years old male with history of asymptomatic RBBB, HLD, recurrent UTIs and BPH, and urethral stricture, status post cystoscopy, UroLift procedure in 2018. Patient presented with hematuria in April 2023 and saw Dr. Purvis for evaluation. S/p Cystoscopy, incision of urethral stricture and TURBT 4/25/2023. 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 S/p Cystocopy, re-TURBT 5/30/2023. Path: no residual tumor identified, muscle present and not involved. Dr. Purvis subsequently referred him to Dr. Newell. He was evaluated by Dr. Mccrary and qualified for -019 phase III trial.   7/26/23 CT urogram showed Multiple bladder diverticulum one of which is along the mid left lateral bladder wall demonstrating nodular enhancing thickening similar to prior exam.  Since last TURBT, patient without any hematuria. He reports that he has reduced flow which he thinks is secondary to his strictures. No HA, vision changes, CP, n/v/d/c. Has mild dyspnea on exertion but able tolerate climbing stairs. He reports that appetite is good.   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   8/23/23: Overall, patient is feeling well, does note some fatigue, however remains active. Had recent urinary tract infection, symptoms have resolved. He denies hematuria, dysuria, urgency and frequency. No fevers. His urine is clear. Does note that he does not feel like he completely emptying his bladder, however this has been ongoing. He will be on keflex, while on study. Pt says he takes tylenol twice a day prophylactically for occ joint pain in his lower pack, shoulder and hip, it is not very severe, thinks he may even be able to do without the tylenol. Patient notes shortness of breath with exertion, however his exercise tolerance is more than one mile when walking. Patient denies fever, chills, headache, vision changes, cough, chest pain, palpitations, abdominal pain, nausea/vomiting, diarrhea, constipation, itching, rashes.   9/5/23: Patient seen today for urgent visit, he reports 5 days ago that he began having symptoms of shooting pain in the base of his skull, mostly on the R side, occ on the L. The pain is intermittent and lasts for diff erent intervals of time . The pain has woken him up from sleep, it does not seem to be worsened or associated with particular movements. He also reports sensitivity of his scalp, mainly on the R side as well. He denies pain over his eyes and pressure in sinuses. Denies frontal headache, sensitivity to light, visual changes, dizziness and lightheadedness. He denies congestion, cough and shortness of breath. Denies sick contacts. His throat feels "thick",  "like something is stuck". He has not had difficulty swallowing, has been eating normally. No fevers. He does not recall an episode of "trauma" that may have set this pain off. He denies new paresthesia elsewhere, he has ongoing paresthesia in three fingers in L hand. Patient has been taking ibuprofen 200mg approx every 4 hours and started taking gabapentin 300mg TID on Saturday. He has this Rx from a prior neck surgery, the last time he took gabapentin was in 2022. He thinks that this combination of medication is helping to relive his pain, but does not completely alleviate it. Patient reports he recently was vaccinated for shingles. He denies nausea/vomiting, diarrhea/constipation, dysuria.    10/4/23 right side of skull pain was resolved. He has difficulty urination with history of urinary tract stricture. He had weekly self cath after BCG treatment for total 6 weeks. He denies other symptoms.   11/15/23: Due to urinary symptoms, patient was taken off ppx keflex and methenamine and started on levaquin 11/9 x 10 days. He was experiencing urgency, frequency (even as often as every 5 - 10 minutes) with little production of urine, and extreme discomfort and end urination. Henever felt like adequately emptied and this has progressed since last BCG treatment. Now urgency and frequency are better, was going 6 times at night and  now just 1-2. Now "just a hint of discomfort". Feels about 80% better. He suprapubic discomfort.  No fevers, no shortness of breath or coughing, no itching or rash. Has an area of dry skin that was itchy on R elbow two weeks ago, it is not erythematous. No constipation or diarrhea. Reports chronic joint pain, taking tylenol twice a day. Pt denies fever and chills, no headaches or visual changes. No chest pain or palpitations. L hand 3rd, 4th and 5th finger with  paresthesia from prior surgery. No swelling.  Energy is a bit low, was not able to do much when his urinary urgency and frequency were at worst, now he is feeling improved. Reports okay appetite, has lost 6lbs but attributes this to eating a bit less / healthier   12/1/23: Treatment held on 11/15/23, due to grade II proteinuria, seen by Nephrology and thought unlikely to be immune mediated nephritis. Patient resumed BCG this week on Wednesday. Per Dr. Mccrary pt is continued on keflex ppx and was started on tamsulosin. He reports still having urinary urgency and frequency, however nothing like his symptoms a few weeks ago. He denies visible blood in urine, does report some occasional sloughing tissue in urine. Notes no "pain" with urination but sometimes a mild "discomfort".  Chronic joint pain is stable. Patient denies fever, chills, cough, shortness of breath, chest pain, palpitations, abdominal pain, nausea/vomiting, diarrhea, constipation, itching, rashes, muscle pain, changes in sensation. [de-identified] : non muscle invasive bladder cancer  [de-identified] : 1/10/24: Overall patient has been feeling well. He denies extreme fatigue, says "it is nothing more than being old". Appetite is okay, not what it used to be. He takes tylenol for chronic joint pain, specifically on R side when laying on that side for an extended period of time - this pain is not new or changing and pre dates treatment. He says that it took him about two weeks for urinary symptoms to come back to baseline after having last BCG treatment. He experienced extreme urgency and frequency, which dissipated after the first few days and has continued to improve each day, now pretty much back to baseline. He denies burning with urination and blood in urine. Feels like he sees less bubbles in urine than prior. Patient denies fever cough, shortness of breath, chest pain, palpitations, abdominal pain, nausea/vomiting, diarrhea, constipation, rashes, joint pain.

## 2024-04-24 ENCOUNTER — APPOINTMENT (OUTPATIENT)
Dept: UROLOGY | Facility: CLINIC | Age: 74
End: 2024-04-24

## 2024-04-24 ENCOUNTER — APPOINTMENT (OUTPATIENT)
Dept: UROLOGY | Facility: CLINIC | Age: 74
End: 2024-04-24
Payer: MEDICARE

## 2024-04-24 ENCOUNTER — OUTPATIENT (OUTPATIENT)
Dept: OUTPATIENT SERVICES | Facility: HOSPITAL | Age: 74
LOS: 1 days | End: 2024-04-24
Payer: MEDICARE

## 2024-04-24 VITALS — SYSTOLIC BLOOD PRESSURE: 122 MMHG | HEART RATE: 83 BPM | OXYGEN SATURATION: 96 % | DIASTOLIC BLOOD PRESSURE: 68 MMHG

## 2024-04-24 VITALS — SYSTOLIC BLOOD PRESSURE: 120 MMHG | DIASTOLIC BLOOD PRESSURE: 75 MMHG

## 2024-04-24 DIAGNOSIS — Z98.890 OTHER SPECIFIED POSTPROCEDURAL STATES: Chronic | ICD-10-CM

## 2024-04-24 DIAGNOSIS — Z98.49 CATARACT EXTRACTION STATUS, UNSPECIFIED EYE: Chronic | ICD-10-CM

## 2024-04-24 DIAGNOSIS — R35.0 FREQUENCY OF MICTURITION: ICD-10-CM

## 2024-04-24 DIAGNOSIS — Z90.49 ACQUIRED ABSENCE OF OTHER SPECIFIED PARTS OF DIGESTIVE TRACT: Chronic | ICD-10-CM

## 2024-04-24 PROCEDURE — 51720 TREATMENT OF BLADDER LESION: CPT

## 2024-04-24 RX ORDER — HYOSCYAMINE SULFATE 0.12 MG/1
0.12 TABLET, ORALLY DISINTEGRATING ORAL
Refills: 0 | Status: COMPLETED | OUTPATIENT
Start: 2024-04-24

## 2024-04-24 RX ORDER — GEMCITABINE 100 MG/ML
2 INJECTION, SOLUTION INTRAVENOUS
Refills: 0 | Status: COMPLETED | OUTPATIENT
Start: 2024-04-24

## 2024-04-24 RX ORDER — GEMCITABINE 38 MG/ML
2 INJECTION, SOLUTION INTRAVENOUS ONCE
Refills: 0 | Status: DISCONTINUED | OUTPATIENT
Start: 2024-04-24 | End: 2024-05-02

## 2024-04-24 RX ORDER — GEMCITABINE 2 G/50ML
2 INJECTION, POWDER, LYOPHILIZED, FOR SOLUTION INTRAVENOUS
Qty: 1 | Refills: 5 | Status: COMPLETED | OUTPATIENT
Start: 2024-04-24 | End: 2024-04-24

## 2024-04-24 RX ADMIN — HYOSCYAMINE SULFATE 1 MG: 0.12 TABLET ORAL at 00:00

## 2024-04-24 RX ADMIN — GEMCITABINE HYDROCHLORIDE 0 GM/20ML: 1 INJECTION, POWDER, LYOPHILIZED, FOR SOLUTION INTRAVENOUS at 00:00

## 2024-04-25 DIAGNOSIS — C67.9 MALIGNANT NEOPLASM OF BLADDER, UNSPECIFIED: ICD-10-CM

## 2024-04-25 PROBLEM — H91.90 UNSPECIFIED HEARING LOSS, UNSPECIFIED EAR: Chronic | Status: ACTIVE | Noted: 2023-04-20

## 2024-04-26 ENCOUNTER — EMERGENCY (EMERGENCY)
Facility: HOSPITAL | Age: 74
LOS: 1 days | Discharge: HOME | End: 2024-04-26
Attending: STUDENT IN AN ORGANIZED HEALTH CARE EDUCATION/TRAINING PROGRAM
Payer: MEDICARE

## 2024-04-26 VITALS
DIASTOLIC BLOOD PRESSURE: 67 MMHG | HEIGHT: 68.5 IN | RESPIRATION RATE: 20 BRPM | OXYGEN SATURATION: 98 % | WEIGHT: 186.07 LBS | TEMPERATURE: 98 F | SYSTOLIC BLOOD PRESSURE: 125 MMHG | HEART RATE: 75 BPM

## 2024-04-26 VITALS
DIASTOLIC BLOOD PRESSURE: 79 MMHG | SYSTOLIC BLOOD PRESSURE: 146 MMHG | HEART RATE: 68 BPM | OXYGEN SATURATION: 98 % | RESPIRATION RATE: 18 BRPM

## 2024-04-26 DIAGNOSIS — Z90.49 ACQUIRED ABSENCE OF OTHER SPECIFIED PARTS OF DIGESTIVE TRACT: Chronic | ICD-10-CM

## 2024-04-26 DIAGNOSIS — Z98.890 OTHER SPECIFIED POSTPROCEDURAL STATES: Chronic | ICD-10-CM

## 2024-04-26 DIAGNOSIS — Z98.49 CATARACT EXTRACTION STATUS, UNSPECIFIED EYE: Chronic | ICD-10-CM

## 2024-04-26 LAB
ALBUMIN SERPL ELPH-MCNC: 3.9 G/DL — SIGNIFICANT CHANGE UP (ref 3.3–5.2)
ALP SERPL-CCNC: 81 U/L — SIGNIFICANT CHANGE UP (ref 40–120)
ALT FLD-CCNC: 32 U/L — SIGNIFICANT CHANGE UP
ANION GAP SERPL CALC-SCNC: 11 MMOL/L — SIGNIFICANT CHANGE UP (ref 5–17)
AST SERPL-CCNC: 34 U/L — SIGNIFICANT CHANGE UP
BASOPHILS # BLD AUTO: 0.04 K/UL — SIGNIFICANT CHANGE UP (ref 0–0.2)
BASOPHILS NFR BLD AUTO: 0.4 % — SIGNIFICANT CHANGE UP (ref 0–2)
BILIRUB SERPL-MCNC: 0.3 MG/DL — LOW (ref 0.4–2)
BUN SERPL-MCNC: 13 MG/DL — SIGNIFICANT CHANGE UP (ref 8–20)
CALCIUM SERPL-MCNC: 8.6 MG/DL — SIGNIFICANT CHANGE UP (ref 8.4–10.5)
CHLORIDE SERPL-SCNC: 104 MMOL/L — SIGNIFICANT CHANGE UP (ref 96–108)
CO2 SERPL-SCNC: 24 MMOL/L — SIGNIFICANT CHANGE UP (ref 22–29)
CREAT SERPL-MCNC: 1.08 MG/DL — SIGNIFICANT CHANGE UP (ref 0.5–1.3)
EGFR: 72 ML/MIN/1.73M2 — SIGNIFICANT CHANGE UP
EOSINOPHIL # BLD AUTO: 0.26 K/UL — SIGNIFICANT CHANGE UP (ref 0–0.5)
EOSINOPHIL NFR BLD AUTO: 2.9 % — SIGNIFICANT CHANGE UP (ref 0–6)
GLUCOSE SERPL-MCNC: 102 MG/DL — HIGH (ref 70–99)
HCT VFR BLD CALC: 41.3 % — SIGNIFICANT CHANGE UP (ref 39–50)
HGB BLD-MCNC: 13.6 G/DL — SIGNIFICANT CHANGE UP (ref 13–17)
IMM GRANULOCYTES NFR BLD AUTO: 0.2 % — SIGNIFICANT CHANGE UP (ref 0–0.9)
LIDOCAIN IGE QN: 32 U/L — SIGNIFICANT CHANGE UP (ref 22–51)
LYMPHOCYTES # BLD AUTO: 1.26 K/UL — SIGNIFICANT CHANGE UP (ref 1–3.3)
LYMPHOCYTES # BLD AUTO: 13.9 % — SIGNIFICANT CHANGE UP (ref 13–44)
MCHC RBC-ENTMCNC: 28.7 PG — SIGNIFICANT CHANGE UP (ref 27–34)
MCHC RBC-ENTMCNC: 32.9 GM/DL — SIGNIFICANT CHANGE UP (ref 32–36)
MCV RBC AUTO: 87.1 FL — SIGNIFICANT CHANGE UP (ref 80–100)
MONOCYTES # BLD AUTO: 0.48 K/UL — SIGNIFICANT CHANGE UP (ref 0–0.9)
MONOCYTES NFR BLD AUTO: 5.3 % — SIGNIFICANT CHANGE UP (ref 2–14)
NEUTROPHILS # BLD AUTO: 6.98 K/UL — SIGNIFICANT CHANGE UP (ref 1.8–7.4)
NEUTROPHILS NFR BLD AUTO: 77.3 % — HIGH (ref 43–77)
PLATELET # BLD AUTO: 178 K/UL — SIGNIFICANT CHANGE UP (ref 150–400)
POTASSIUM SERPL-MCNC: 4.1 MMOL/L — SIGNIFICANT CHANGE UP (ref 3.5–5.3)
POTASSIUM SERPL-SCNC: 4.1 MMOL/L — SIGNIFICANT CHANGE UP (ref 3.5–5.3)
PROT SERPL-MCNC: 6 G/DL — LOW (ref 6.6–8.7)
RBC # BLD: 4.74 M/UL — SIGNIFICANT CHANGE UP (ref 4.2–5.8)
RBC # FLD: 13.9 % — SIGNIFICANT CHANGE UP (ref 10.3–14.5)
SODIUM SERPL-SCNC: 139 MMOL/L — SIGNIFICANT CHANGE UP (ref 135–145)
WBC # BLD: 9.04 K/UL — SIGNIFICANT CHANGE UP (ref 3.8–10.5)
WBC # FLD AUTO: 9.04 K/UL — SIGNIFICANT CHANGE UP (ref 3.8–10.5)

## 2024-04-26 PROCEDURE — 85025 COMPLETE CBC W/AUTO DIFF WBC: CPT

## 2024-04-26 PROCEDURE — 74177 CT ABD & PELVIS W/CONTRAST: CPT | Mod: MC

## 2024-04-26 PROCEDURE — 36415 COLL VENOUS BLD VENIPUNCTURE: CPT

## 2024-04-26 PROCEDURE — 80053 COMPREHEN METABOLIC PANEL: CPT

## 2024-04-26 PROCEDURE — 99285 EMERGENCY DEPT VISIT HI MDM: CPT

## 2024-04-26 PROCEDURE — 74177 CT ABD & PELVIS W/CONTRAST: CPT | Mod: 26,MC

## 2024-04-26 PROCEDURE — 99284 EMERGENCY DEPT VISIT MOD MDM: CPT | Mod: 25

## 2024-04-26 PROCEDURE — 83690 ASSAY OF LIPASE: CPT

## 2024-04-26 PROCEDURE — 96374 THER/PROPH/DIAG INJ IV PUSH: CPT | Mod: XU

## 2024-04-26 PROCEDURE — 76705 ECHO EXAM OF ABDOMEN: CPT

## 2024-04-26 PROCEDURE — 76705 ECHO EXAM OF ABDOMEN: CPT | Mod: 26

## 2024-04-26 RX ORDER — ACETAMINOPHEN 500 MG
1000 TABLET ORAL ONCE
Refills: 0 | Status: COMPLETED | OUTPATIENT
Start: 2024-04-26 | End: 2024-04-26

## 2024-04-26 RX ADMIN — Medication 400 MILLIGRAM(S): at 08:34

## 2024-04-26 RX ADMIN — Medication 1000 MILLIGRAM(S): at 08:58

## 2024-04-26 RX ADMIN — Medication 1000 MILLIGRAM(S): at 08:49

## 2024-04-26 NOTE — ED ADULT NURSE REASSESSMENT NOTE - NS ED NURSE REASSESS COMMENT FT1
Pt laying in bed, resting/watching TV. Pt A&Ox4 in NAD @ this time. RR even & unlabored. Pt denies any pain, complaints, or needs @ this time. Pt awaiting US. Wife @ bedside. Safety maintained.
Pt returned to ED from US. Pt laying in bed, talking w/ wife @ bedside. Pt A&Ox4 in NAD @ this time. RR even & unlabored. Pt denies any pain, complaints, or needs @ this time. Pt awaiting US results and dispo. Safety maintained.

## 2024-04-26 NOTE — ED PROVIDER NOTE - PATIENT PORTAL LINK FT
You can access the FollowMyHealth Patient Portal offered by [facility] by registering at the following website: [email or web address]. By joining [employer]’s FollowMyHealth portal, you will also be able to view your health information using other applications (apps) compatible with our system.

## 2024-04-26 NOTE — ED PROVIDER NOTE - OBJECTIVE STATEMENT
73 yom pmh bladder Ca on treatment follows with Dr. Stinson here with resolved mid abdominal pain. Reports woken up this morning around 6 am with severe abd pain x 15 mins. Resolved and then occurred again which prompting him to come to ED. reports now pain is significantly improved but still feels mildy bloated and pressure. Denies fever, n/v, cp, sob, dysuria, diarrhea.

## 2024-04-26 NOTE — ED PROVIDER NOTE - NSICDXPASTMEDICALHX_GEN_ALL_CORE_FT
PAST MEDICAL HISTORY:  Bladder tumor     BPH (benign prostatic hyperplasia) s/p UroLift    Cervical osteophyte     Cervical radiculopathy     Cervical spinal stenosis     Enterococcus UTI 8/26/2021 treated with ABx--resolved, symptom free now    Epiploic appendagitis ER visit 8/26/2021    History of gross hematuria     [place] (hard of hearing)     Hypothyroid     RBBB

## 2024-04-26 NOTE — ED PROVIDER NOTE - CLINICAL SUMMARY MEDICAL DECISION MAKING FREE TEXT BOX
73 yom pmh bladder Ca on treatment follows with Dr. Stinson here with resolved mid abdominal pain. Reports woken up this morning around 6 am with severe abd pain x 15 mins. Resolved and then occurred again which prompting him to come to ED. reports now pain is significantly improved but still feels mildly bloated and pressure. Denies fever, n/v, cp, sob, dysuria, diarrhea.   Ap - mild pressure still but nontoxic appearing, abd soft, no distension. will check labs. CT to eval and reassess 73 yom pmh bladder Ca on treatment follows with Dr. Stinson here with resolved mid abdominal pain. Reports woken up this morning around 6 am with severe abd pain x 15 mins. Resolved and then occurred again which prompting him to come to ED. reports now pain is significantly improved but still feels mildly bloated and pressure. Denies fever, n/v, cp, sob, dysuria, diarrhea.   Ap - mild pressure still but nontoxic appearing, abd soft, no distension. will check labs. CT to eval and reassess  Progress feeling well, no gallstones or pericholecystic fluid on sono. nonspecific wall thickening. follow up with pmd and oncologist. patient agreeable to dc plan

## 2024-04-26 NOTE — ED ADULT NURSE NOTE - OBJECTIVE STATEMENT
Pt to ED c/o 2 episodes of sudden onset of non-radiating epigastric pain that awoke pt this am around 0600 and has since subsided. Pt denies cp, sob, n/v/d, diaphoresis, dizziness/lightheadedness, fever, ua s/s. Pt states he finished an abx yesterday for UTI. Pt has hx of bladder CA, last chemo instillation was Wednesday. Pt A&Ox4 in NAD @ this time. RR even & unlabored.

## 2024-04-26 NOTE — ED PROVIDER NOTE - NSFOLLOWUPINSTRUCTIONS_ED_ALL_ED_FT
Follow up with your oncologist.     Abdominal Pain    Many things can cause abdominal pain. Many times, abdominal pain is not caused by a disease and will improve without treatment. Your health care provider will do a physical exam to determine if there is a dangerous cause of your pain; blood tests and imaging may help determine the cause of your pain. However, in many cases, no cause may be found and you may need further testing as an outpatient. Monitor your abdominal pain for any changes.     SEEK IMMEDIATE MEDICAL CARE IF YOU HAVE ANY OF THE FOLLOWING SYMPTOMS: worsening abdominal pain, uncontrollable vomiting, profuse diarrhea, inability to have bowel movements or pass gas, black or bloody stools, fever accompanying chest pain or back pain, or fainting. These symptoms may represent a serious problem that is an emergency. Do not wait to see if the symptoms will go away. Get medical help right away. Call 911 and do not drive yourself to the hospital.

## 2024-04-26 NOTE — ED ADULT NURSE NOTE - ALCOHOL PRE SCREEN (AUDIT - C)
Pre-Visit Planning     Future Appointments   Date Time Provider Department Center   8/12/2020  7:10 AM Wes Braga PA-C [facility]     Arrival Time for this Appointment:  7:10 AM   Appointment Notes for this encounter:   PHQ, ASUNCION AP OK -Ph:[phone] Discuss Right shoulder soreness    Questionnaires Reviewed/Assigned  No additional questionnaires are needed      Patient preferred phone number: [phone]    Unable to reach. Left voicemail. Advised patient to call clinic back at [phone].   Statement Selected

## 2024-04-26 NOTE — ED ADULT NURSE NOTE - NSICDXPASTMEDICALHX_GEN_ALL_CORE_FT
PAST MEDICAL HISTORY:  Bladder tumor     BPH (benign prostatic hyperplasia) s/p UroLift    Cervical osteophyte     Cervical radiculopathy     Cervical spinal stenosis     Enterococcus UTI 8/26/2021 treated with ABx--resolved, symptom free now    Epiploic appendagitis ER visit 8/26/2021    History of gross hematuria     [place] (hard of hearing)     Hypothyroid     RBBB

## 2024-04-29 ENCOUNTER — NON-APPOINTMENT (OUTPATIENT)
Age: 74
End: 2024-04-29

## 2024-05-01 ENCOUNTER — OUTPATIENT (OUTPATIENT)
Dept: OUTPATIENT SERVICES | Facility: HOSPITAL | Age: 74
LOS: 1 days | End: 2024-05-01
Payer: MEDICARE

## 2024-05-01 ENCOUNTER — APPOINTMENT (OUTPATIENT)
Dept: UROLOGY | Facility: CLINIC | Age: 74
End: 2024-05-01
Payer: MEDICARE

## 2024-05-01 VITALS — DIASTOLIC BLOOD PRESSURE: 80 MMHG | SYSTOLIC BLOOD PRESSURE: 120 MMHG | HEART RATE: 86 BPM

## 2024-05-01 DIAGNOSIS — Z98.890 OTHER SPECIFIED POSTPROCEDURAL STATES: Chronic | ICD-10-CM

## 2024-05-01 DIAGNOSIS — R35.0 FREQUENCY OF MICTURITION: ICD-10-CM

## 2024-05-01 PROCEDURE — 51720 TREATMENT OF BLADDER LESION: CPT

## 2024-05-01 RX ORDER — GEMCITABINE 100 MG/ML
2 INJECTION, SOLUTION INTRAVENOUS
Refills: 0 | Status: COMPLETED | OUTPATIENT
Start: 2024-05-01

## 2024-05-01 RX ORDER — HYOSCYAMINE SULFATE 0.12 MG/1
0.12 TABLET, ORALLY DISINTEGRATING ORAL
Refills: 0 | Status: COMPLETED | OUTPATIENT
Start: 2024-05-01

## 2024-05-01 RX ORDER — GEMCITABINE 38 MG/ML
2 INJECTION, SOLUTION INTRAVENOUS ONCE
Refills: 0 | Status: DISCONTINUED | OUTPATIENT
Start: 2024-05-01 | End: 2024-05-16

## 2024-05-01 RX ORDER — HYOSCYAMINE SULFATE 0.12 MG/1
0.12 TABLET, ORALLY DISINTEGRATING ORAL
Qty: 1 | Refills: 0 | Status: COMPLETED | OUTPATIENT
Start: 2024-05-01 | End: 2024-05-01

## 2024-05-01 RX ORDER — GEMCITABINE 2 G/50ML
2 INJECTION, POWDER, LYOPHILIZED, FOR SOLUTION INTRAVENOUS
Qty: 1 | Refills: 5 | Status: COMPLETED | OUTPATIENT
Start: 2024-05-01 | End: 2024-05-01

## 2024-05-01 RX ADMIN — GEMCITABINE HYDROCHLORIDE 0 GM/20ML: 1 INJECTION, POWDER, LYOPHILIZED, FOR SOLUTION INTRAVENOUS at 00:00

## 2024-05-01 RX ADMIN — HYOSCYAMINE SULFATE 1 MG: 0.12 TABLET SUBLINGUAL at 00:00

## 2024-05-03 DIAGNOSIS — C67.9 MALIGNANT NEOPLASM OF BLADDER, UNSPECIFIED: ICD-10-CM

## 2024-05-07 NOTE — ASU DISCHARGE PLAN (ADULT/PEDIATRIC) - SIGNS AND SYMPTOMS OF INFECTION: FEVER, REDNESS, SWELLING, FOUL SMELLING DISCHARGE
Continue omeprazole before breakfast     Take sips of water between bites    Chew food well    Complete EGD     Follow up after   
Statement Selected

## 2024-05-09 ENCOUNTER — APPOINTMENT (OUTPATIENT)
Dept: UROLOGY | Facility: CLINIC | Age: 74
End: 2024-05-09
Payer: MEDICARE

## 2024-05-09 ENCOUNTER — OUTPATIENT (OUTPATIENT)
Dept: OUTPATIENT SERVICES | Facility: HOSPITAL | Age: 74
LOS: 1 days | End: 2024-05-09
Payer: MEDICARE

## 2024-05-09 VITALS — SYSTOLIC BLOOD PRESSURE: 111 MMHG | DIASTOLIC BLOOD PRESSURE: 65 MMHG | HEART RATE: 87 BPM

## 2024-05-09 DIAGNOSIS — Z98.890 OTHER SPECIFIED POSTPROCEDURAL STATES: Chronic | ICD-10-CM

## 2024-05-09 DIAGNOSIS — R35.0 FREQUENCY OF MICTURITION: ICD-10-CM

## 2024-05-09 DIAGNOSIS — Z98.49 CATARACT EXTRACTION STATUS, UNSPECIFIED EYE: Chronic | ICD-10-CM

## 2024-05-09 PROCEDURE — 51720 TREATMENT OF BLADDER LESION: CPT

## 2024-05-09 RX ORDER — GEMCITABINE 38 MG/ML
2 INJECTION, SOLUTION INTRAVENOUS ONCE
Refills: 0 | Status: DISCONTINUED | OUTPATIENT
Start: 2024-05-09 | End: 2024-05-23

## 2024-05-09 RX ORDER — GEMCITABINE 100 MG/ML
2 INJECTION, SOLUTION INTRAVENOUS
Refills: 0 | Status: COMPLETED | OUTPATIENT
Start: 2024-05-09

## 2024-05-09 RX ORDER — HYOSCYAMINE SULFATE 0.12 MG/1
0.12 TABLET, ORALLY DISINTEGRATING ORAL
Refills: 0 | Status: COMPLETED | OUTPATIENT
Start: 2024-05-09

## 2024-05-09 RX ORDER — GEMCITABINE 2 G/50ML
2 INJECTION, POWDER, LYOPHILIZED, FOR SOLUTION INTRAVENOUS
Qty: 1 | Refills: 5 | Status: COMPLETED | OUTPATIENT
Start: 2024-05-09 | End: 2024-05-09

## 2024-05-09 RX ORDER — HYOSCYAMINE SULFATE 0.12 MG/1
0.12 TABLET, ORALLY DISINTEGRATING ORAL
Qty: 1 | Refills: 0 | Status: COMPLETED | OUTPATIENT
Start: 2023-12-14 | End: 2024-05-09

## 2024-05-09 RX ADMIN — GEMCITABINE HYDROCHLORIDE 0 GM/20ML: 1 INJECTION, POWDER, LYOPHILIZED, FOR SOLUTION INTRAVENOUS at 00:00

## 2024-05-09 RX ADMIN — HYOSCYAMINE SULFATE 1 MG: 0.12 TABLET SUBLINGUAL at 00:00

## 2024-05-15 ENCOUNTER — APPOINTMENT (OUTPATIENT)
Dept: INFUSION THERAPY | Facility: HOSPITAL | Age: 74
End: 2024-05-15

## 2024-05-15 ENCOUNTER — APPOINTMENT (OUTPATIENT)
Dept: HEMATOLOGY ONCOLOGY | Facility: CLINIC | Age: 74
End: 2024-05-15

## 2024-05-16 ENCOUNTER — OUTPATIENT (OUTPATIENT)
Dept: OUTPATIENT SERVICES | Facility: HOSPITAL | Age: 74
LOS: 1 days | End: 2024-05-16
Payer: MEDICARE

## 2024-05-16 ENCOUNTER — APPOINTMENT (OUTPATIENT)
Dept: UROLOGY | Facility: CLINIC | Age: 74
End: 2024-05-16
Payer: MEDICARE

## 2024-05-16 VITALS — DIASTOLIC BLOOD PRESSURE: 70 MMHG | SYSTOLIC BLOOD PRESSURE: 106 MMHG | HEART RATE: 70 BPM

## 2024-05-16 DIAGNOSIS — Z98.890 OTHER SPECIFIED POSTPROCEDURAL STATES: Chronic | ICD-10-CM

## 2024-05-16 DIAGNOSIS — C67.9 MALIGNANT NEOPLASM OF BLADDER, UNSPECIFIED: ICD-10-CM

## 2024-05-16 DIAGNOSIS — Z98.49 CATARACT EXTRACTION STATUS, UNSPECIFIED EYE: Chronic | ICD-10-CM

## 2024-05-16 DIAGNOSIS — R35.0 FREQUENCY OF MICTURITION: ICD-10-CM

## 2024-05-16 DIAGNOSIS — Z90.49 ACQUIRED ABSENCE OF OTHER SPECIFIED PARTS OF DIGESTIVE TRACT: Chronic | ICD-10-CM

## 2024-05-16 PROCEDURE — 51720 TREATMENT OF BLADDER LESION: CPT

## 2024-05-16 RX ORDER — HYOSCYAMINE SULFATE 0.12 MG/1
0.12 TABLET, ORALLY DISINTEGRATING ORAL
Refills: 0 | Status: COMPLETED | OUTPATIENT
Start: 2024-05-16

## 2024-05-16 RX ORDER — GEMCITABINE 2 G/50ML
2 INJECTION, POWDER, LYOPHILIZED, FOR SOLUTION INTRAVENOUS
Qty: 1 | Refills: 0 | Status: DISCONTINUED | OUTPATIENT
Start: 2024-04-18 | End: 2024-05-16

## 2024-05-16 RX ORDER — GEMCITABINE 38 MG/ML
1 INJECTION, SOLUTION INTRAVENOUS ONCE
Refills: 0 | Status: DISCONTINUED | OUTPATIENT
Start: 2024-05-16 | End: 2024-05-30

## 2024-05-16 RX ORDER — GEMCITABINE HYDROCHLORIDE 1 G/1
1 INJECTION, POWDER, LYOPHILIZED, FOR SOLUTION INTRAVENOUS
Qty: 1 | Refills: 0 | Status: COMPLETED | OUTPATIENT
Start: 2024-05-16 | End: 2024-05-16

## 2024-05-16 RX ORDER — GEMCITABINE 100 MG/ML
1 INJECTION, SOLUTION INTRAVENOUS
Refills: 0 | Status: COMPLETED | OUTPATIENT
Start: 2024-05-16

## 2024-05-16 RX ORDER — GEMCITABINE 2 G/50ML
2 INJECTION, POWDER, LYOPHILIZED, FOR SOLUTION INTRAVENOUS
Qty: 1 | Refills: 0 | Status: DISCONTINUED | OUTPATIENT
Start: 2024-05-16 | End: 2024-05-16

## 2024-05-16 RX ADMIN — GEMCITABINE HYDROCHLORIDE 0 GM/10ML: 1 INJECTION, POWDER, LYOPHILIZED, FOR SOLUTION INTRAVENOUS at 00:00

## 2024-05-16 RX ADMIN — HYOSCYAMINE SULFATE 1 MG: 0.12 TABLET SUBLINGUAL at 00:00

## 2024-05-21 DIAGNOSIS — C67.9 MALIGNANT NEOPLASM OF BLADDER, UNSPECIFIED: ICD-10-CM

## 2024-05-23 ENCOUNTER — APPOINTMENT (OUTPATIENT)
Dept: UROLOGY | Facility: CLINIC | Age: 74
End: 2024-05-23
Payer: MEDICARE

## 2024-05-23 ENCOUNTER — OUTPATIENT (OUTPATIENT)
Dept: OUTPATIENT SERVICES | Facility: HOSPITAL | Age: 74
LOS: 1 days | End: 2024-05-23
Payer: MEDICARE

## 2024-05-23 VITALS — SYSTOLIC BLOOD PRESSURE: 119 MMHG | DIASTOLIC BLOOD PRESSURE: 78 MMHG | HEART RATE: 87 BPM

## 2024-05-23 DIAGNOSIS — Z98.890 OTHER SPECIFIED POSTPROCEDURAL STATES: Chronic | ICD-10-CM

## 2024-05-23 DIAGNOSIS — Z98.49 CATARACT EXTRACTION STATUS, UNSPECIFIED EYE: Chronic | ICD-10-CM

## 2024-05-23 DIAGNOSIS — Z90.49 ACQUIRED ABSENCE OF OTHER SPECIFIED PARTS OF DIGESTIVE TRACT: Chronic | ICD-10-CM

## 2024-05-23 DIAGNOSIS — R35.0 FREQUENCY OF MICTURITION: ICD-10-CM

## 2024-05-23 PROCEDURE — 51720 TREATMENT OF BLADDER LESION: CPT

## 2024-05-23 RX ORDER — GEMCITABINE 2 G/50ML
2 INJECTION, POWDER, LYOPHILIZED, FOR SOLUTION INTRAVENOUS
Qty: 1 | Refills: 0 | Status: COMPLETED | OUTPATIENT
Start: 2024-05-23 | End: 2024-05-23

## 2024-05-23 RX ORDER — HYOSCYAMINE SULFATE 0.12 MG/1
0.12 TABLET, ORALLY DISINTEGRATING ORAL
Qty: 1 | Refills: 0 | Status: COMPLETED | OUTPATIENT
Start: 2024-05-23 | End: 2024-05-23

## 2024-05-23 RX ORDER — GEMCITABINE HYDROCHLORIDE 1 G/1
1 INJECTION, POWDER, LYOPHILIZED, FOR SOLUTION INTRAVENOUS
Qty: 1 | Refills: 0 | Status: COMPLETED | OUTPATIENT
Start: 2024-05-23 | End: 2024-05-23

## 2024-05-23 RX ORDER — HYOSCYAMINE SULFATE 0.12 MG/1
0.12 TABLET, ORALLY DISINTEGRATING ORAL
Refills: 0 | Status: COMPLETED | OUTPATIENT
Start: 2024-05-23

## 2024-05-23 RX ORDER — GEMCITABINE 100 MG/ML
1 INJECTION, SOLUTION INTRAVENOUS
Refills: 0 | Status: COMPLETED | OUTPATIENT
Start: 2024-05-23

## 2024-05-23 RX ORDER — GEMCITABINE 38 MG/ML
1 INJECTION, SOLUTION INTRAVENOUS ONCE
Refills: 0 | Status: DISCONTINUED | OUTPATIENT
Start: 2024-05-23 | End: 2024-06-06

## 2024-05-23 RX ADMIN — HYOSCYAMINE SULFATE 1 MG: 0.12 TABLET SUBLINGUAL at 00:00

## 2024-05-23 RX ADMIN — GEMCITABINE HYDROCHLORIDE 0 GM/10ML: 1 INJECTION, POWDER, LYOPHILIZED, FOR SOLUTION INTRAVENOUS at 00:00

## 2024-05-25 PROBLEM — C67.9 BLADDER CANCER: Status: ACTIVE | Noted: 2023-05-11

## 2024-05-28 DIAGNOSIS — C67.9 MALIGNANT NEOPLASM OF BLADDER, UNSPECIFIED: ICD-10-CM

## 2024-06-26 ENCOUNTER — OUTPATIENT (OUTPATIENT)
Dept: OUTPATIENT SERVICES | Facility: HOSPITAL | Age: 74
LOS: 1 days | End: 2024-06-26
Payer: MEDICARE

## 2024-06-26 VITALS
SYSTOLIC BLOOD PRESSURE: 119 MMHG | HEART RATE: 67 BPM | HEIGHT: 68 IN | WEIGHT: 210.98 LBS | DIASTOLIC BLOOD PRESSURE: 76 MMHG | OXYGEN SATURATION: 97 % | TEMPERATURE: 98 F

## 2024-06-26 DIAGNOSIS — N35.912 UNSPECIFIED BULBOUS URETHRAL STRICTURE, MALE: ICD-10-CM

## 2024-06-26 DIAGNOSIS — Z98.49 CATARACT EXTRACTION STATUS, UNSPECIFIED EYE: Chronic | ICD-10-CM

## 2024-06-26 DIAGNOSIS — Z98.890 OTHER SPECIFIED POSTPROCEDURAL STATES: Chronic | ICD-10-CM

## 2024-06-26 DIAGNOSIS — Z98.1 ARTHRODESIS STATUS: Chronic | ICD-10-CM

## 2024-06-26 DIAGNOSIS — Z01.818 ENCOUNTER FOR OTHER PREPROCEDURAL EXAMINATION: ICD-10-CM

## 2024-06-26 DIAGNOSIS — C67.9 MALIGNANT NEOPLASM OF BLADDER, UNSPECIFIED: ICD-10-CM

## 2024-06-26 DIAGNOSIS — Z90.49 ACQUIRED ABSENCE OF OTHER SPECIFIED PARTS OF DIGESTIVE TRACT: Chronic | ICD-10-CM

## 2024-06-26 LAB
ANION GAP SERPL CALC-SCNC: 12 MMOL/L — SIGNIFICANT CHANGE UP (ref 5–17)
BUN SERPL-MCNC: 14 MG/DL — SIGNIFICANT CHANGE UP (ref 7–23)
CALCIUM SERPL-MCNC: 9.2 MG/DL — SIGNIFICANT CHANGE UP (ref 8.4–10.5)
CHLORIDE SERPL-SCNC: 104 MMOL/L — SIGNIFICANT CHANGE UP (ref 96–108)
CO2 SERPL-SCNC: 24 MMOL/L — SIGNIFICANT CHANGE UP (ref 22–31)
CREAT SERPL-MCNC: 1.23 MG/DL — SIGNIFICANT CHANGE UP (ref 0.5–1.3)
EGFR: 62 ML/MIN/1.73M2 — SIGNIFICANT CHANGE UP
GLUCOSE SERPL-MCNC: 104 MG/DL — HIGH (ref 70–99)
HCT VFR BLD CALC: 42.3 % — SIGNIFICANT CHANGE UP (ref 39–50)
HCV AB S/CO SERPL IA: 0.07 S/CO — SIGNIFICANT CHANGE UP (ref 0–0.99)
HCV AB SERPL-IMP: SIGNIFICANT CHANGE UP
HGB BLD-MCNC: 13.4 G/DL — SIGNIFICANT CHANGE UP (ref 13–17)
MCHC RBC-ENTMCNC: 28.5 PG — SIGNIFICANT CHANGE UP (ref 27–34)
MCHC RBC-ENTMCNC: 31.7 GM/DL — LOW (ref 32–36)
MCV RBC AUTO: 89.8 FL — SIGNIFICANT CHANGE UP (ref 80–100)
NRBC # BLD: 0 /100 WBCS — SIGNIFICANT CHANGE UP (ref 0–0)
PLATELET # BLD AUTO: 199 K/UL — SIGNIFICANT CHANGE UP (ref 150–400)
POTASSIUM SERPL-MCNC: 4.3 MMOL/L — SIGNIFICANT CHANGE UP (ref 3.5–5.3)
POTASSIUM SERPL-SCNC: 4.3 MMOL/L — SIGNIFICANT CHANGE UP (ref 3.5–5.3)
RBC # BLD: 4.71 M/UL — SIGNIFICANT CHANGE UP (ref 4.2–5.8)
RBC # FLD: 15.2 % — HIGH (ref 10.3–14.5)
SODIUM SERPL-SCNC: 140 MMOL/L — SIGNIFICANT CHANGE UP (ref 135–145)
WBC # BLD: 7.04 K/UL — SIGNIFICANT CHANGE UP (ref 3.8–10.5)
WBC # FLD AUTO: 7.04 K/UL — SIGNIFICANT CHANGE UP (ref 3.8–10.5)

## 2024-06-26 PROCEDURE — G0463: CPT

## 2024-06-26 PROCEDURE — 80048 BASIC METABOLIC PNL TOTAL CA: CPT

## 2024-06-26 PROCEDURE — 87186 SC STD MICRODIL/AGAR DIL: CPT

## 2024-06-26 PROCEDURE — 86803 HEPATITIS C AB TEST: CPT

## 2024-06-26 PROCEDURE — 87077 CULTURE AEROBIC IDENTIFY: CPT

## 2024-06-26 PROCEDURE — 85027 COMPLETE CBC AUTOMATED: CPT

## 2024-06-26 PROCEDURE — 87086 URINE CULTURE/COLONY COUNT: CPT

## 2024-06-26 RX ORDER — CHOLECALCIFEROL (VITAMIN D3) 125 MCG
1 CAPSULE ORAL
Refills: 0 | DISCHARGE

## 2024-06-26 RX ORDER — SODIUM CHLORIDE 0.9 % (FLUSH) 0.9 %
3 SYRINGE (ML) INJECTION EVERY 8 HOURS
Refills: 0 | Status: DISCONTINUED | OUTPATIENT
Start: 2024-07-17 | End: 2024-07-31

## 2024-06-26 RX ORDER — LEVOTHYROXINE SODIUM 125 MCG
1 TABLET ORAL
Refills: 0 | DISCHARGE

## 2024-06-26 RX ORDER — ASPIRIN/CALCIUM CARB/MAGNESIUM 324 MG
1 TABLET ORAL
Refills: 0 | DISCHARGE

## 2024-06-26 RX ORDER — DEXTROSE MONOHYDRATE AND SODIUM CHLORIDE 5; .3 G/100ML; G/100ML
1000 INJECTION, SOLUTION INTRAVENOUS
Refills: 0 | Status: DISCONTINUED | OUTPATIENT
Start: 2024-07-17 | End: 2024-07-31

## 2024-06-26 RX ORDER — ROSUVASTATIN CALCIUM 5 MG/1
1 TABLET ORAL
Refills: 0 | DISCHARGE

## 2024-06-27 PROBLEM — H91.90 UNSPECIFIED HEARING LOSS, UNSPECIFIED EAR: Chronic | Status: INACTIVE | Noted: 2023-04-20 | Resolved: 2024-06-26

## 2024-06-29 LAB
-  AMOXICILLIN/CLAVULANIC ACID: SIGNIFICANT CHANGE UP
-  AMPICILLIN/SULBACTAM: SIGNIFICANT CHANGE UP
-  AMPICILLIN: SIGNIFICANT CHANGE UP
-  AZTREONAM: SIGNIFICANT CHANGE UP
-  CEFAZOLIN: SIGNIFICANT CHANGE UP
-  CEFEPIME: SIGNIFICANT CHANGE UP
-  CEFOXITIN: SIGNIFICANT CHANGE UP
-  CEFTRIAXONE: SIGNIFICANT CHANGE UP
-  CIPROFLOXACIN: SIGNIFICANT CHANGE UP
-  ERTAPENEM: SIGNIFICANT CHANGE UP
-  GENTAMICIN: SIGNIFICANT CHANGE UP
-  IMIPENEM: SIGNIFICANT CHANGE UP
-  LEVOFLOXACIN: SIGNIFICANT CHANGE UP
-  MEROPENEM: SIGNIFICANT CHANGE UP
-  NITROFURANTOIN: SIGNIFICANT CHANGE UP
-  PIPERACILLIN/TAZOBACTAM: SIGNIFICANT CHANGE UP
-  TOBRAMYCIN: SIGNIFICANT CHANGE UP
-  TRIMETHOPRIM/SULFAMETHOXAZOLE: SIGNIFICANT CHANGE UP
CULTURE RESULTS: ABNORMAL
METHOD TYPE: SIGNIFICANT CHANGE UP
ORGANISM # SPEC MICROSCOPIC CNT: ABNORMAL
ORGANISM # SPEC MICROSCOPIC CNT: ABNORMAL
SPECIMEN SOURCE: SIGNIFICANT CHANGE UP

## 2024-07-01 ENCOUNTER — NON-APPOINTMENT (OUTPATIENT)
Age: 74
End: 2024-07-01

## 2024-07-02 ENCOUNTER — APPOINTMENT (OUTPATIENT)
Dept: UROLOGY | Facility: CLINIC | Age: 74
End: 2024-07-02

## 2024-07-02 PROBLEM — H91.90 UNSPECIFIED HEARING LOSS, UNSPECIFIED EAR: Chronic | Status: ACTIVE | Noted: 2024-06-26

## 2024-07-02 RX ORDER — CIPROFLOXACIN HYDROCHLORIDE 500 MG/1
500 TABLET, FILM COATED ORAL TWICE DAILY
Qty: 10 | Refills: 0 | Status: ACTIVE | COMMUNITY
Start: 2024-07-02 | End: 1900-01-01

## 2024-07-03 ENCOUNTER — NON-APPOINTMENT (OUTPATIENT)
Age: 74
End: 2024-07-03

## 2024-07-12 ENCOUNTER — OUTPATIENT (OUTPATIENT)
Dept: OUTPATIENT SERVICES | Facility: HOSPITAL | Age: 74
LOS: 1 days | End: 2024-07-12
Payer: MEDICARE

## 2024-07-12 ENCOUNTER — APPOINTMENT (OUTPATIENT)
Dept: UROLOGY | Facility: CLINIC | Age: 74
End: 2024-07-12
Payer: MEDICARE

## 2024-07-12 VITALS
HEART RATE: 66 BPM | TEMPERATURE: 97.7 F | DIASTOLIC BLOOD PRESSURE: 64 MMHG | SYSTOLIC BLOOD PRESSURE: 104 MMHG | RESPIRATION RATE: 17 BRPM | OXYGEN SATURATION: 97 %

## 2024-07-12 DIAGNOSIS — Z90.49 ACQUIRED ABSENCE OF OTHER SPECIFIED PARTS OF DIGESTIVE TRACT: Chronic | ICD-10-CM

## 2024-07-12 DIAGNOSIS — R35.0 FREQUENCY OF MICTURITION: ICD-10-CM

## 2024-07-12 DIAGNOSIS — Z98.1 ARTHRODESIS STATUS: Chronic | ICD-10-CM

## 2024-07-12 DIAGNOSIS — Z98.890 OTHER SPECIFIED POSTPROCEDURAL STATES: Chronic | ICD-10-CM

## 2024-07-12 DIAGNOSIS — N35.912 UNSPECIFIED BULBOUS URETHRAL STRICTURE, MALE: ICD-10-CM

## 2024-07-12 DIAGNOSIS — Z98.49 CATARACT EXTRACTION STATUS, UNSPECIFIED EYE: Chronic | ICD-10-CM

## 2024-07-12 PROCEDURE — 51610 INJECTION FOR BLADDER X-RAY: CPT

## 2024-07-12 PROCEDURE — 52000 CYSTOURETHROSCOPY: CPT

## 2024-07-15 ENCOUNTER — APPOINTMENT (OUTPATIENT)
Dept: OTOLARYNGOLOGY | Facility: CLINIC | Age: 74
End: 2024-07-15
Payer: MEDICARE

## 2024-07-15 ENCOUNTER — APPOINTMENT (OUTPATIENT)
Dept: PHARMACY | Facility: CLINIC | Age: 74
End: 2024-07-15
Payer: SELF-PAY

## 2024-07-15 ENCOUNTER — APPOINTMENT (OUTPATIENT)
Age: 74
End: 2024-07-15

## 2024-07-15 VITALS
DIASTOLIC BLOOD PRESSURE: 70 MMHG | SYSTOLIC BLOOD PRESSURE: 110 MMHG | HEIGHT: 68 IN | BODY MASS INDEX: 32.13 KG/M2 | WEIGHT: 212 LBS | HEART RATE: 68 BPM

## 2024-07-15 DIAGNOSIS — H93.8X3 OTHER SPECIFIED DISORDERS OF EAR, BILATERAL: ICD-10-CM

## 2024-07-15 DIAGNOSIS — H60.60 UNSPECIFIED CHRONIC OTITIS EXTERNA, UNSPECIFIED EAR: ICD-10-CM

## 2024-07-15 DIAGNOSIS — H61.20 IMPACTED CERUMEN, UNSPECIFIED EAR: ICD-10-CM

## 2024-07-15 DIAGNOSIS — H90.5 UNSPECIFIED SENSORINEURAL HEARING LOSS: ICD-10-CM

## 2024-07-15 DIAGNOSIS — N35.912 UNSPECIFIED BULBOUS URETHRAL STRICTURE, MALE: ICD-10-CM

## 2024-07-15 LAB
APPEARANCE: CLEAR
BACTERIA UR CULT: NORMAL
BACTERIA: NEGATIVE /HPF
BILIRUBIN URINE: NEGATIVE
BLOOD URINE: NEGATIVE
CAST: 2 /LPF
EPITHELIAL CELLS: 3 /HPF
GLUCOSE QUALITATIVE U: NEGATIVE MG/DL
KETONES URINE: NEGATIVE MG/DL
LEUKOCYTE ESTERASE URINE: NEGATIVE
MICROSCOPIC-UA: NORMAL
NITRITE URINE: NEGATIVE
PH URINE: 5.5
SPECIFIC GRAVITY URINE: >1.03
UROBILINOGEN URINE: 0.2 MG/DL
WHITE BLOOD CELLS URINE: 9 /HPF

## 2024-07-15 PROCEDURE — G0268 REMOVAL OF IMPACTED WAX MD: CPT

## 2024-07-15 PROCEDURE — V5267C: CUSTOM

## 2024-07-15 PROCEDURE — 99213 OFFICE O/P EST LOW 20 MIN: CPT | Mod: 25

## 2024-07-15 PROCEDURE — V5267D: CUSTOM

## 2024-07-16 ENCOUNTER — TRANSCRIPTION ENCOUNTER (OUTPATIENT)
Age: 74
End: 2024-07-16

## 2024-07-17 ENCOUNTER — RESULT REVIEW (OUTPATIENT)
Age: 74
End: 2024-07-17

## 2024-07-17 ENCOUNTER — APPOINTMENT (OUTPATIENT)
Dept: UROLOGY | Facility: HOSPITAL | Age: 74
End: 2024-07-17

## 2024-07-17 ENCOUNTER — TRANSCRIPTION ENCOUNTER (OUTPATIENT)
Age: 74
End: 2024-07-17

## 2024-07-17 ENCOUNTER — OUTPATIENT (OUTPATIENT)
Dept: OUTPATIENT SERVICES | Facility: HOSPITAL | Age: 74
LOS: 1 days | End: 2024-07-17
Payer: MEDICARE

## 2024-07-17 VITALS
SYSTOLIC BLOOD PRESSURE: 119 MMHG | OXYGEN SATURATION: 119 % | DIASTOLIC BLOOD PRESSURE: 65 MMHG | RESPIRATION RATE: 15 BRPM | HEIGHT: 68 IN | WEIGHT: 210.98 LBS | HEART RATE: 54 BPM | TEMPERATURE: 97 F

## 2024-07-17 VITALS
RESPIRATION RATE: 18 BRPM | SYSTOLIC BLOOD PRESSURE: 91 MMHG | HEART RATE: 82 BPM | OXYGEN SATURATION: 99 % | DIASTOLIC BLOOD PRESSURE: 51 MMHG

## 2024-07-17 DIAGNOSIS — Z98.49 CATARACT EXTRACTION STATUS, UNSPECIFIED EYE: Chronic | ICD-10-CM

## 2024-07-17 DIAGNOSIS — Z98.890 OTHER SPECIFIED POSTPROCEDURAL STATES: Chronic | ICD-10-CM

## 2024-07-17 DIAGNOSIS — N35.912 UNSPECIFIED BULBOUS URETHRAL STRICTURE, MALE: ICD-10-CM

## 2024-07-17 DIAGNOSIS — Z90.49 ACQUIRED ABSENCE OF OTHER SPECIFIED PARTS OF DIGESTIVE TRACT: Chronic | ICD-10-CM

## 2024-07-17 DIAGNOSIS — Z98.1 ARTHRODESIS STATUS: Chronic | ICD-10-CM

## 2024-07-17 PROCEDURE — 52224 CYSTOSCOPY AND TREATMENT: CPT

## 2024-07-17 PROCEDURE — C1769: CPT

## 2024-07-17 PROCEDURE — C1758: CPT

## 2024-07-17 PROCEDURE — 88305 TISSUE EXAM BY PATHOLOGIST: CPT | Mod: 26

## 2024-07-17 PROCEDURE — C9399: CPT

## 2024-07-17 PROCEDURE — 88305 TISSUE EXAM BY PATHOLOGIST: CPT

## 2024-07-17 PROCEDURE — 53410 RECONSTRUCTION OF URETHRA: CPT

## 2024-07-17 PROCEDURE — 52234 CYSTOSCOPY AND TREATMENT: CPT

## 2024-07-17 DEVICE — GUIDEWIRE SENSOR DUAL-FLEX NITINOL STRAIGHT .035" X 150CM: Type: IMPLANTABLE DEVICE | Status: FUNCTIONAL

## 2024-07-17 DEVICE — URETERAL CATH OPEN END 5FR 70CM: Type: IMPLANTABLE DEVICE | Status: FUNCTIONAL

## 2024-07-17 RX ORDER — ASPIRIN 325 MG/1
0 TABLET, FILM COATED ORAL
Refills: 0 | DISCHARGE

## 2024-07-17 RX ORDER — HYDROMORPHONE HCL 0.2 MG/ML
0.5 INJECTION, SOLUTION INTRAVENOUS ONCE
Refills: 0 | Status: DISCONTINUED | OUTPATIENT
Start: 2024-07-17 | End: 2024-07-17

## 2024-07-17 RX ORDER — OXYCODONE HYDROCHLORIDE 100 MG/5ML
1 SOLUTION ORAL
Qty: 12 | Refills: 0
Start: 2024-07-17 | End: 2024-07-19

## 2024-07-17 RX ORDER — ONDANSETRON HYDROCHLORIDE 2 MG/ML
4 INJECTION INTRAMUSCULAR; INTRAVENOUS ONCE
Refills: 0 | Status: DISCONTINUED | OUTPATIENT
Start: 2024-07-17 | End: 2024-07-31

## 2024-07-17 RX ORDER — CIPROFLOXACIN HCL 500 MG
1 TABLET ORAL
Qty: 10 | Refills: 0
Start: 2024-07-17 | End: 2024-07-21

## 2024-07-17 RX ORDER — OXYCODONE HYDROCHLORIDE 100 MG/5ML
10 SOLUTION ORAL ONCE
Refills: 0 | Status: DISCONTINUED | OUTPATIENT
Start: 2024-07-17 | End: 2024-07-17

## 2024-07-17 RX ORDER — SULFAMETHOXAZOLE AND TRIMETHOPRIM 800; 160 MG/1; MG/1
1 TABLET ORAL
Qty: 6 | Refills: 0
Start: 2024-07-17 | End: 2024-07-19

## 2024-07-17 RX ORDER — OXYCODONE HYDROCHLORIDE 100 MG/5ML
5 SOLUTION ORAL ONCE
Refills: 0 | Status: DISCONTINUED | OUTPATIENT
Start: 2024-07-17 | End: 2024-07-17

## 2024-07-17 RX ORDER — CEFAZOLIN 10 G/1
2000 INJECTION, POWDER, FOR SOLUTION INTRAVENOUS ONCE
Refills: 0 | Status: COMPLETED | OUTPATIENT
Start: 2024-07-17 | End: 2024-07-17

## 2024-07-17 RX ORDER — LIDOCAINE HYDROCHLORIDE 20 MG/ML
0.2 INJECTION, SOLUTION EPIDURAL; INFILTRATION; INTRACAUDAL; PERINEURAL ONCE
Refills: 0 | Status: COMPLETED | OUTPATIENT
Start: 2024-07-17 | End: 2024-07-17

## 2024-07-17 RX ORDER — OXYCODONE 5 MG/1
5 TABLET ORAL
Qty: 12 | Refills: 0 | Status: ACTIVE | COMMUNITY
Start: 2024-07-17

## 2024-07-17 RX ADMIN — DEXTROSE MONOHYDRATE AND SODIUM CHLORIDE 100 MILLILITER(S): 5; .3 INJECTION, SOLUTION INTRAVENOUS at 06:36

## 2024-07-17 NOTE — BRIEF OPERATIVE NOTE - OPERATION/FINDINGS
Cystoscopy with non transecting anastomotic urethroplasty and bladder biopsy with fulguration. 18Fr [place] tip esteban catheter in place

## 2024-07-17 NOTE — ASU DISCHARGE PLAN (ADULT/PEDIATRIC) - NURSING INSTRUCTIONS
You can take Tylenol (Acetaminophen) every 6 hours and Motrin (Ibuprofen) every 6 hours, as needed. You may alternate these medications such that you take one or the other every 3 hours for around the clock pain coverage. Do not exceed 4000mg of Tylenol (Acetaminophen) daily. The next time you can take Tylenol is at 3:45PM and the next time you can take Ibuprofen is at 5:30PM, if needed for pain and no contraindications.

## 2024-07-17 NOTE — ASU PATIENT PROFILE, ADULT - MEDICATIONS BROUGHT TO HOSPITAL, PROFILE
IT SEEMS LIKE SHE HAS FIBROMYALGIA SYMPTOMS AS WELL AS THE BLACK MOLD IN THE HOME (WHICH THEY ARE WORKING ON)
no

## 2024-07-17 NOTE — ASU PATIENT PROFILE, ADULT - NSICDXPASTMEDICALHX_GEN_ALL_CORE_FT
PAST MEDICAL HISTORY:  Bladder tumor     BPH (benign prostatic hyperplasia) s/p UroLift    Cervical osteophyte     Cervical radiculopathy     Cervical spinal stenosis     Enterococcus UTI 8/26/2021 treated with ABx--resolved, symptom free now    Epiploic appendagitis ER visit 8/26/2021    History of gross hematuria     [place] (hard of hearing)     Hypothyroid     RBBB

## 2024-07-17 NOTE — ASU DISCHARGE PLAN (ADULT/PEDIATRIC) - ASU DC SPECIAL INSTRUCTIONSFT
URETHRAL STRICTURE – URETHROPLASTY    Description of Surgery  Urethroplasty is a surgery performed to correct a urethral stricture. A urethral stricture refers to a blockage or narrowing of the urethra, the urine “tube” or part of the urinary tract that releases urine from the bladder and semen during ejaculation.  During surgery, an incision is typically made in the perineum just below the scrotum and the blocked area is removed and repaired.  Sometimes an incision will be made in the penis if the stricture is in the penile urethra.  Following repair a urinary catheter is left in place for assist in healing.    Postoperative Care  •	After surgery, you may be discharged home the same day or stay in the hospital overnight.  There will be a catheter left in place which will be connected to a drainage bag (either a leg bag or a larger overnight bag).  You will be taught how to change the bag and empty it when it is full of urine.  The catheter will remain in place for roughly 2-3 weeks and will be removed at your follow up.  You may notice some blood or red/pink tinge to the urine with the catheter in place, this is normal during healing.  •	Immediately following surgery, the perineal incision is closed with absorbable sutures underneath the skin followed by biological, waterproof glue on the skin.  The sutures will slowly dissolve over time and do not need to be removed.   The bluish colored glue will also slowly peel away on its own over a period of a few weeks.  In some instances, additional dressings may be placed such as gauze and a clear plastic sheet.  If a gauze dressing is placed, this may be removed after 3 days.  •	You may experience mild perineal/scrotal itching.  This is normal and is the result of tissue healing.  You can also expect bruising and swelling of the perineum and scrotum following surgery, this can last for days to weeks.  Ice packs can be applied for 24 hours after surgery to reduce pain and swelling if necessary.  The amount of swelling is variable.  Scrotal support can also help reduce swelling and is best applied continuously during the initial few days after surgery.  •	Pain in this area will vary.  You will be given pain medication to go home with.  Generally, most patients do not require strong pain medication for more than a couple of weeks.  Oftentimes, patients are able to transition themselves to over-the-counter pain medication alone, such as Extra-strength Tylenol or Ibuprofen at that time.     Diet  •	There are no specific dietary restrictions unless buccal mucosa was harvested.  You may resume your usual diet immediately after surgery.  You should avoid alcohol while taking pain medications.  Do drink plenty of fluids to keep yourself well hydrated and your urine clear.  •	If buccal mucosa was harvested from the mouth, then you should stick to liquids initially after surgery, such as soups, broths, shakes and drinks.  Most patients are able to transition to soft or pureed foods after 3-5 days.  Generally speaking, by one week you may resume a fairly normal diet, excepting for foods that may be difficult to chew or have sharp edges.  Immediately after surgery, it is important to chew gum/exercise the mouth so that the buccal mucosa heals well and does not constrict the mouth.    Bathing  •	Avoid bathing, showering, or soaking for two days after surgery.  After 2 days, it is ok to shower but take care to avoid scrubbing or rubbing the incision.  It is best to allow soapy water to run over the surgical incision and to pat it dry.  •	If you must bathe prior to the second day, it is best to sponge bathe and avoid wetting the incision.  Soaking in a tub should be avoided for one week.    Discharge Medications  •	You will be discharged home with a prescription for pain medication and an antibiotic for 5 days (ciprofloxacin)  •	In addition, prior to/around your catheter removal, you may be given further antibiotics (bactrim)    Activities after surgery  •	You may resume normal activities such as walking, lifting (no more than 10 pounds) and walking up stairs.  •	You should refrain from high impact exercise (running, jumping, aerobics) and heavy lifting for 3-4 weeks following surgery, depending on your doctor’s instructions.  •	Do not drive a car while you are taking narcotic pain medication. Before you drive, you need to know if your abdomen and leg muscles can react well. Have someone with you until you feel you have healed and you can drive safely.  •	In general use your common sense and do what you feel up to doing. If you feel tired, take it easy. If you feel more energetic resume normal activities.    When to call your doctor  •	If you experience a temperature above 101°F or shaking chills.  •	Difficulty urinating or feeling like you cannot adequately empty your bladder once your catheter has been removed.  Some mild irritation or urinary changes is normal during the healing process, however significant pain with urination or inability to urine is unusual.  •	Swelling, redness or discharge of the incision sites.  •	Pain that is not controlled by pain medications.  •	Persistent nausea, vomiting or diarrhea.    Postoperative appointment  •	In general, your first post-operative appointment will take place approximately 2 weeks after surgery.  Your catheter may or may not be removed at this time.

## 2024-07-17 NOTE — BRIEF OPERATIVE NOTE - NSICDXBRIEFPROCEDURE_GEN_ALL_CORE_FT
PROCEDURES:  Cystoscopy with anastomotic urethroplasty 17-Jul-2024 11:46:15  Drake Argueta  Bladder biopsy 17-Jul-2024 11:46:52  Drake Argueta

## 2024-07-17 NOTE — ASU PATIENT PROFILE, ADULT - NSICDXPASTSURGICALHX_GEN_ALL_CORE_FT
PAST SURGICAL HISTORY:  H/O colonoscopy     H/O transurethral resection of bladder tumor (TURBT)     History of appendectomy lap    History of fusion of cervical spine     S/P cataract surgery     S/P colonoscopic polypectomy     S/P endoscopy     S/P urological surgery

## 2024-07-17 NOTE — ASU PATIENT PROFILE, ADULT - TEACHING/LEARNING FACTORS IMPACT ABILITY TO LEARN
patient was critically ill... Patient was critically ill with a high probability of imminent or life threatening deterioration. none

## 2024-07-17 NOTE — PRE-ANESTHESIA EVALUATION ADULT - BSA (M2)
Abdomen , soft, nontender, nondistended , no guarding or rigidity , no masses palpable , Liver and Spleen , no hepatomegaly present , no hepatosplenomegaly , liver nontender 2.09

## 2024-07-23 LAB — SURGICAL PATHOLOGY STUDY: SIGNIFICANT CHANGE UP

## 2024-07-29 ENCOUNTER — APPOINTMENT (OUTPATIENT)
Age: 74
End: 2024-07-29

## 2024-07-29 ENCOUNTER — NON-APPOINTMENT (OUTPATIENT)
Age: 74
End: 2024-07-29

## 2024-08-02 ENCOUNTER — APPOINTMENT (OUTPATIENT)
Dept: UROLOGY | Facility: CLINIC | Age: 74
End: 2024-08-02

## 2024-08-02 ENCOUNTER — OUTPATIENT (OUTPATIENT)
Dept: OUTPATIENT SERVICES | Facility: HOSPITAL | Age: 74
LOS: 1 days | End: 2024-08-02
Payer: MEDICARE

## 2024-08-02 ENCOUNTER — NON-APPOINTMENT (OUTPATIENT)
Age: 74
End: 2024-08-02

## 2024-08-02 VITALS — OXYGEN SATURATION: 98 % | DIASTOLIC BLOOD PRESSURE: 71 MMHG | SYSTOLIC BLOOD PRESSURE: 126 MMHG | HEART RATE: 79 BPM

## 2024-08-02 DIAGNOSIS — Z98.890 OTHER SPECIFIED POSTPROCEDURAL STATES: Chronic | ICD-10-CM

## 2024-08-02 DIAGNOSIS — Z98.49 CATARACT EXTRACTION STATUS, UNSPECIFIED EYE: Chronic | ICD-10-CM

## 2024-08-02 DIAGNOSIS — N35.912 UNSPECIFIED BULBOUS URETHRAL STRICTURE, MALE: ICD-10-CM

## 2024-08-02 DIAGNOSIS — Z98.1 ARTHRODESIS STATUS: Chronic | ICD-10-CM

## 2024-08-02 DIAGNOSIS — Z90.49 ACQUIRED ABSENCE OF OTHER SPECIFIED PARTS OF DIGESTIVE TRACT: Chronic | ICD-10-CM

## 2024-08-02 DIAGNOSIS — R35.0 FREQUENCY OF MICTURITION: ICD-10-CM

## 2024-08-02 PROCEDURE — 51610 INJECTION FOR BLADDER X-RAY: CPT | Mod: 58

## 2024-08-02 PROCEDURE — 51610 INJECTION FOR BLADDER X-RAY: CPT

## 2024-08-02 PROCEDURE — 74450 X-RAY URETHRA/BLADDER: CPT

## 2024-08-03 ENCOUNTER — EMERGENCY (EMERGENCY)
Facility: HOSPITAL | Age: 74
LOS: 1 days | Discharge: HOME | End: 2024-08-03
Attending: EMERGENCY MEDICINE
Payer: MEDICARE

## 2024-08-03 VITALS
SYSTOLIC BLOOD PRESSURE: 112 MMHG | HEART RATE: 82 BPM | TEMPERATURE: 98 F | DIASTOLIC BLOOD PRESSURE: 72 MMHG | RESPIRATION RATE: 18 BRPM | OXYGEN SATURATION: 99 %

## 2024-08-03 VITALS
TEMPERATURE: 98 F | RESPIRATION RATE: 20 BRPM | SYSTOLIC BLOOD PRESSURE: 110 MMHG | DIASTOLIC BLOOD PRESSURE: 72 MMHG | WEIGHT: 179.9 LBS | HEART RATE: 87 BPM | HEIGHT: 68 IN | OXYGEN SATURATION: 98 %

## 2024-08-03 DIAGNOSIS — Z98.890 OTHER SPECIFIED POSTPROCEDURAL STATES: Chronic | ICD-10-CM

## 2024-08-03 DIAGNOSIS — Z90.49 ACQUIRED ABSENCE OF OTHER SPECIFIED PARTS OF DIGESTIVE TRACT: Chronic | ICD-10-CM

## 2024-08-03 DIAGNOSIS — Z98.1 ARTHRODESIS STATUS: Chronic | ICD-10-CM

## 2024-08-03 DIAGNOSIS — Z98.49 CATARACT EXTRACTION STATUS, UNSPECIFIED EYE: Chronic | ICD-10-CM

## 2024-08-03 LAB
ALBUMIN SERPL ELPH-MCNC: 4 G/DL — SIGNIFICANT CHANGE UP (ref 3.3–5.2)
ALP SERPL-CCNC: 164 U/L — HIGH (ref 40–120)
ALT FLD-CCNC: 188 U/L — HIGH
ANION GAP SERPL CALC-SCNC: 13 MMOL/L — SIGNIFICANT CHANGE UP (ref 5–17)
AST SERPL-CCNC: 311 U/L — HIGH
BILIRUB SERPL-MCNC: 1.3 MG/DL — SIGNIFICANT CHANGE UP (ref 0.4–2)
BUN SERPL-MCNC: 17.8 MG/DL — SIGNIFICANT CHANGE UP (ref 8–20)
CALCIUM SERPL-MCNC: 9 MG/DL — SIGNIFICANT CHANGE UP (ref 8.4–10.5)
CHLORIDE SERPL-SCNC: 100 MMOL/L — SIGNIFICANT CHANGE UP (ref 96–108)
CO2 SERPL-SCNC: 23 MMOL/L — SIGNIFICANT CHANGE UP (ref 22–29)
CREAT SERPL-MCNC: 1.47 MG/DL — HIGH (ref 0.5–1.3)
EGFR: 50 ML/MIN/1.73M2 — LOW
EGFR: 50 ML/MIN/1.73M2 — LOW
GLUCOSE SERPL-MCNC: 108 MG/DL — HIGH (ref 70–99)
HCT VFR BLD CALC: 41.6 % — SIGNIFICANT CHANGE UP (ref 39–50)
HGB BLD-MCNC: 13.7 G/DL — SIGNIFICANT CHANGE UP (ref 13–17)
MCHC RBC-ENTMCNC: 28.9 PG — SIGNIFICANT CHANGE UP (ref 27–34)
MCHC RBC-ENTMCNC: 32.9 GM/DL — SIGNIFICANT CHANGE UP (ref 32–36)
MCV RBC AUTO: 87.8 FL — SIGNIFICANT CHANGE UP (ref 80–100)
PLATELET # BLD AUTO: 197 K/UL — SIGNIFICANT CHANGE UP (ref 150–400)
POTASSIUM SERPL-MCNC: 4.4 MMOL/L — SIGNIFICANT CHANGE UP (ref 3.5–5.3)
POTASSIUM SERPL-SCNC: 4.4 MMOL/L — SIGNIFICANT CHANGE UP (ref 3.5–5.3)
PROT SERPL-MCNC: 6.7 G/DL — SIGNIFICANT CHANGE UP (ref 6.6–8.7)
RBC # BLD: 4.74 M/UL — SIGNIFICANT CHANGE UP (ref 4.2–5.8)
RBC # FLD: 13.1 % — SIGNIFICANT CHANGE UP (ref 10.3–14.5)
SODIUM SERPL-SCNC: 136 MMOL/L — SIGNIFICANT CHANGE UP (ref 135–145)
WBC # BLD: 10.64 K/UL — HIGH (ref 3.8–10.5)
WBC # FLD AUTO: 10.64 K/UL — HIGH (ref 3.8–10.5)

## 2024-08-03 PROCEDURE — 96365 THER/PROPH/DIAG IV INF INIT: CPT

## 2024-08-03 PROCEDURE — 80053 COMPREHEN METABOLIC PANEL: CPT

## 2024-08-03 PROCEDURE — 85027 COMPLETE CBC AUTOMATED: CPT

## 2024-08-03 PROCEDURE — 36415 COLL VENOUS BLD VENIPUNCTURE: CPT

## 2024-08-03 PROCEDURE — 99284 EMERGENCY DEPT VISIT MOD MDM: CPT | Mod: GC

## 2024-08-03 PROCEDURE — 96375 TX/PRO/DX INJ NEW DRUG ADDON: CPT

## 2024-08-03 PROCEDURE — 99284 EMERGENCY DEPT VISIT MOD MDM: CPT | Mod: 25

## 2024-08-03 RX ORDER — ALCLOMETASONE DIPROPIONATE 0.5 MG/G
1 CREAM TOPICAL
Qty: 1 | Refills: 0
Start: 2024-08-03 | End: 2024-08-16

## 2024-08-03 RX ORDER — DEXAMETHASONE 0.5 MG/1
6 TABLET ORAL ONCE
Refills: 0 | Status: COMPLETED | OUTPATIENT
Start: 2024-08-03 | End: 2024-08-03

## 2024-08-03 RX ADMIN — Medication 20 MILLIGRAM(S): at 21:46

## 2024-08-03 RX ADMIN — DEXAMETHASONE 6 MILLIGRAM(S): 0.5 TABLET ORAL at 20:45

## 2024-08-03 RX ADMIN — Medication 100 MILLIGRAM(S): at 21:16

## 2024-08-05 ENCOUNTER — NON-APPOINTMENT (OUTPATIENT)
Age: 74
End: 2024-08-05

## 2024-08-05 DIAGNOSIS — N35.912 UNSPECIFIED BULBOUS URETHRAL STRICTURE, MALE: ICD-10-CM

## 2024-08-06 ENCOUNTER — APPOINTMENT (OUTPATIENT)
Dept: DERMATOLOGY | Facility: CLINIC | Age: 74
End: 2024-08-06

## 2024-08-06 PROBLEM — L85.3 XEROSIS CUTIS: Status: ACTIVE | Noted: 2024-08-06

## 2024-08-06 PROBLEM — L27.1 FIXED DRUG ERUPTION: Status: ACTIVE | Noted: 2024-08-06

## 2024-08-06 PROCEDURE — 99204 OFFICE O/P NEW MOD 45 MIN: CPT

## 2024-08-06 NOTE — HISTORY OF PRESENT ILLNESS
[FreeTextEntry1] : NPV rash [de-identified] : Mr. KATJA MARINELLI is a 73 year-old male who presents for:  1) Rash, back, groin - Had urethroplasty on 7/17 - took 3 doses of Bactrim on August 1st/2nd for ppx for follow up on the 2nd during which he had his Hawk removed - rash started on hand and groin after second dose - switched Bactrim to Cipro - took Cipro for 2 days - Uro told him to use Benadryl and bacitracin - Some lesions blistered and worsened later - Mouth also affected - sore and painful associated with trouble swallowing - went to ED on 8/3 for abdominal pain and rash and got a dose of IV decadron and was prescribed fluocinonide solution for the mouth and alclometasone for body - Lesions are not itchy or painful  - Fever yesterday of 100.9  - Yesterday started to have redness at tip of penis  - Denies dysuria, difficulty breathing or eye irritation   PMH: hx of bladder cancer on trial of Keytruda and s/p 1 dose of gemcitabine last year  Derm Hx: none Social Hx: retired, lives with wife

## 2024-08-06 NOTE — PHYSICAL EXAM
[Alert] : alert [Oriented x 3] : ~L oriented x 3 [Well Nourished] : well nourished [Conjunctiva Non-injected] : conjunctiva non-injected [No Visual Lymphadenopathy] : no visual  lymphadenopathy [No Clubbing] : no clubbing [No Edema] : no edema [No Bromhidrosis] : no bromhidrosis [No Chromhidrosis] : no chromhidrosis [FreeTextEntry3] : Notable skin findings included: - Erythematous deroofed bullae and edematous plaques on trunk, extremities and groin - Mucosal sloughing with white plaques on soft palate; no gingival involvement noted - Erythematous plaque on tip of penis

## 2024-08-06 NOTE — ASSESSMENT
[FreeTextEntry1] : #Fixed drug eruption, trunk, extremities, oral mucosa, groin, tip of penis, new diagnosis of uncertain prognosis - We have discussed the nature and course of this condition - given acute onset, morphology and distribution  - Likely as a reaction to Bactrim, which pt should now consider an allergy - We have discussed treatment options, expectations from treatments, and associated side effects of topical and systemic therapies - Reviewed that there are no disease modifying treatments and that management is primarily supportive following discontinuation of the culprit drug - START triamcinolone 0.1% ointment twice daily to affected areas for 2 weeks - Proper topical steroid use and side effects discussed, including cutaneous atrophy, telangiectasias, and striae. Avoid long-term use. - START dexamethasone 0.5 mg/mL oral solution once to twice daily as swish and spit for 2 weeks - Proper topical steroid use and side effects discussed, including oral mucosal atrophy. Avoid long-term use.  #Xerosis cutis, new diagnosis of uncertain prognosis - We have discussed the nature and course of this condition - We have discussed treatment options, expectations from treatments, and associated side effects of topical and systemic therapies - Discussed gentle skin care regimen including short, lukewarm showers, liberal application of moisturizers, and use of fragrance-free products  RTC 2 weeks (at [place] if pt prefers)

## 2024-08-19 ENCOUNTER — APPOINTMENT (OUTPATIENT)
Dept: DERMATOLOGY | Facility: CLINIC | Age: 74
End: 2024-08-19
Payer: MEDICARE

## 2024-08-19 VITALS — BODY MASS INDEX: 31.37 KG/M2 | WEIGHT: 207 LBS | HEIGHT: 68 IN

## 2024-08-19 DIAGNOSIS — L27.1 LOCALIZED SKIN ERUPTION DUE TO DRUGS AND MEDICAMENTS TAKEN INTERNALLY: ICD-10-CM

## 2024-08-19 PROCEDURE — 99213 OFFICE O/P EST LOW 20 MIN: CPT

## 2024-08-19 NOTE — ASSESSMENT
[FreeTextEntry1] : #Fixed drug eruption, trunk, extremities, oral mucosa, groin, tip of penis - improved Diagnosis reviewed Tx options discussed d/c TAC ointment for now  OK to use vaseline until completely resolved

## 2024-08-19 NOTE — PHYSICAL EXAM
[Alert] : alert [Oriented x 3] : ~L oriented x 3 [Well Nourished] : well nourished [Conjunctiva Non-injected] : conjunctiva non-injected [No Visual Lymphadenopathy] : no visual  lymphadenopathy [No Clubbing] : no clubbing [No Edema] : no edema [No Bromhidrosis] : no bromhidrosis [No Chromhidrosis] : no chromhidrosis [FreeTextEntry3] : Notable skin findings included: - Healing erosions on the back, R mons pubis, and L glans penis - OP clear

## 2024-08-19 NOTE — HISTORY OF PRESENT ILLNESS
[FreeTextEntry1] : f/u FDE [de-identified] : Mr. KATJA MARINELLI is a 73 year-old male who presents for:  1) f/u FDE 2/2 to Bactrim - now improved. No longer with oral ulcerations. Lesions in the groin still healing.  Hx of FDE - Had urethroplasty on 7/17 - took 3 doses of Bactrim on August 1st/2nd for ppx for follow up on the 2nd during which he had his [place] removed - rash started on hand and groin after second dose - switched Bactrim to Cipro - took Cipro for 2 days - Uro told him to use Benadryl and bacitracin - Some lesions blistered and worsened later - Mouth also affected - sore and painful associated with trouble swallowing - went to ED on 8/3 for abdominal pain and rash and got a dose of IV decadron and was prescribed fluocinonide solution for the mouth and alclometasone for body - Lesions are not itchy or painful  - Fever yesterday of 100.9  - Yesterday started to have redness at tip of penis  - Denies dysuria, difficulty breathing or eye irritation   PMH: hx of bladder cancer on trial of Keytruda and s/p 1 dose of gemcitabine last year  Derm Hx: none Social Hx: retired, lives with wife

## 2024-08-27 DIAGNOSIS — R82.90 UNSPECIFIED ABNORMAL FINDINGS IN URINE: ICD-10-CM

## 2024-08-27 PROBLEM — R39.9 UTI SYMPTOMS: Status: ACTIVE | Noted: 2024-08-27

## 2024-08-27 NOTE — ASU PREOP CHECKLIST - PATIENT'S PERSONAL PROPERTY GIVEN TO
"Anesthesia Post Operative"~"- Anesthesia Post Op Note"~"Vital Signs Stable-See Nursing Note: Yes"~"Airway Patent: Yes"~"Adequate Pain Control: Yes"~"Change in Mental Status: No"~"Current Postoperative Nausea & Vomiting: No"~"Anesthesia Complications: No"~"General Anesthetic Recall: No"~"Unplanned Admission: No"~"Post Op Hydration Adequate: Yes"
family member

## 2024-08-28 LAB
APPEARANCE: ABNORMAL
BACTERIA: ABNORMAL /HPF
BILIRUBIN URINE: NEGATIVE
BLOOD URINE: ABNORMAL
CAST: 2 /LPF
COLOR: YELLOW
EPITHELIAL CELLS: 1 /HPF
GLUCOSE QUALITATIVE U: NEGATIVE MG/DL
KETONES URINE: NEGATIVE MG/DL
LEUKOCYTE ESTERASE URINE: ABNORMAL
MICROSCOPIC-UA: NORMAL
NITRITE URINE: POSITIVE
PH URINE: 6.5
PROTEIN URINE: 100 MG/DL
RED BLOOD CELLS URINE: 6 /HPF
REVIEW: NORMAL
SPECIFIC GRAVITY URINE: 1.02
UROBILINOGEN URINE: 0.2 MG/DL
WBC CLUMPS: PRESENT
WHITE BLOOD CELLS URINE: >998 /HPF

## 2024-08-28 RX ORDER — AMOXICILLIN AND CLAVULANATE POTASSIUM 875; 125 MG/1; MG/1
875-125 TABLET, COATED ORAL
Qty: 14 | Refills: 0 | Status: ACTIVE | COMMUNITY
Start: 2024-08-28 | End: 1900-01-01

## 2024-08-29 ENCOUNTER — NON-APPOINTMENT (OUTPATIENT)
Age: 74
End: 2024-08-29

## 2024-08-31 ENCOUNTER — RX RENEWAL (OUTPATIENT)
Age: 74
End: 2024-08-31

## 2024-09-03 ENCOUNTER — APPOINTMENT (OUTPATIENT)
Age: 74
End: 2024-09-03

## 2024-09-03 ENCOUNTER — APPOINTMENT (OUTPATIENT)
Dept: UROLOGY | Facility: CLINIC | Age: 74
End: 2024-09-03
Payer: MEDICARE

## 2024-09-03 VITALS
HEART RATE: 63 BPM | HEIGHT: 68 IN | SYSTOLIC BLOOD PRESSURE: 123 MMHG | RESPIRATION RATE: 17 BRPM | DIASTOLIC BLOOD PRESSURE: 78 MMHG | BODY MASS INDEX: 31.07 KG/M2 | WEIGHT: 205 LBS

## 2024-09-03 DIAGNOSIS — N35.912 UNSPECIFIED BULBOUS URETHRAL STRICTURE, MALE: ICD-10-CM

## 2024-09-03 DIAGNOSIS — R39.9 UNSPECIFIED SYMPTOMS AND SIGNS INVOLVING THE GENITOURINARY SYSTEM: ICD-10-CM

## 2024-09-03 PROCEDURE — 99024 POSTOP FOLLOW-UP VISIT: CPT

## 2024-09-03 PROCEDURE — 51798 US URINE CAPACITY MEASURE: CPT

## 2024-09-03 RX ORDER — CIPROFLOXACIN HYDROCHLORIDE 500 MG/1
500 TABLET, FILM COATED ORAL TWICE DAILY
Qty: 14 | Refills: 0 | Status: ACTIVE | COMMUNITY
Start: 2024-09-03 | End: 1900-01-01

## 2024-09-03 NOTE — HISTORY OF PRESENT ILLNESS
[FreeTextEntry1] : Patient is a 73-year-old male with bladder cancer who presents for urethral stricture.   HPI History of urolift in May 2017,  status post dilation in Nov 2018 [facility]. Subsequently he noticed weaker slower stream. History of bladder cancer, status post TURBT in 4/2023 and 2/28/24. Was on clinical trial w BCG and pembr but could not tolerate BCG and also recurred. He will start BLI chemotherapy/gemcitabine instillations tomorrow.  History of frequent UTI in the past several years. He tried methenamine/Vit C with improvement in symptoms, not able to tolerate further as maintenance due to dysuria/frequency/nocturia.   After bladder resection, He subsequently noticed urinary frequency, urgency, cystoscopy with dilation x2 by Dr. Mccrary within a few weeks.  Initially right after dilations, FOS, frequency and nocturia were better, but only lasted for a few weeks/month.  Currently he reports urinary frequency every 1-2 hours, intermittent stream, hesitancy, nocturia x 3-5, strain to empty, takes longer to empty, incomplete empty sensation.  Interval hx: S/p urethroplasty 7/17/24, bladder ca recurrence noted intraop.  Bladder bx/resection - Ta HG.  Per Dr Mccrary, plan for surveillance cysto for Oct 24 with Dr Mccrary. Here for f/u. Has been voiding much better, but dealing with UTI.  Was given augmentin - but not sensitive.  Cipro sent today.  He does feel his dysuria and malodor has improved with augmentin.   Nocturia x1-2.  Good FOS.

## 2024-09-03 NOTE — PHYSICAL EXAM
[General Appearance - Well Developed] : well developed [Normal Appearance] : normal appearance [General Appearance - In No Acute Distress] : no acute distress [Exaggerated Use Of Accessory Muscles For Inspiration] : no accessory muscle use [] : no respiratory distress

## 2024-09-03 NOTE — ASSESSMENT
[FreeTextEntry1] : Patient is a 72 yo M who presents for LUTS and urethral stricture. He has a h/o recurrent HG bladder cancer.  Most recent dilation/bladder hx in 2/28/24 He is starting Gemcitabine BLI x6 wks starting tomr.  S/p urethroplasty 7/17/24, bladder ca recurrence noted intraop.  Bladder bx/resection - Ta HG.  Per Dr Mccrary, plan for surveillance cysto for Oct 24 with Dr Mccrary. Voiding well PVR 16 cc Repeat ua/cx - cipro sent, but he will hold off until cx results F/u 3-4 mos

## 2024-09-05 ENCOUNTER — APPOINTMENT (OUTPATIENT)
Age: 74
End: 2024-09-05

## 2024-09-05 LAB
APPEARANCE: CLEAR
BACTERIA: NEGATIVE /HPF
BILIRUBIN URINE: NEGATIVE
BLOOD URINE: ABNORMAL
CAST: 0 /LPF
COLOR: YELLOW
EPITHELIAL CELLS: 2 /HPF
GLUCOSE QUALITATIVE U: NEGATIVE MG/DL
KETONES URINE: NEGATIVE MG/DL
LEUKOCYTE ESTERASE URINE: ABNORMAL
MICROSCOPIC-UA: NORMAL
NITRITE URINE: NEGATIVE
PH URINE: 6.5
PROTEIN URINE: NEGATIVE MG/DL
RED BLOOD CELLS URINE: 2 /HPF
SPECIFIC GRAVITY URINE: 1.01
UROBILINOGEN URINE: 0.2 MG/DL
WHITE BLOOD CELLS URINE: 17 /HPF

## 2024-09-23 NOTE — H&P PST ADULT - PROBLEM/PLAN-1
Hypertrophic Cardiomyopathy Clinic Follow up Note    Current Symptoms:     Elder returns for a follow up of nonobstructive HCM and to discuss test results. He is accompanies by his mother.     He states that he is feeling better with addition of Jardiance and Spironolactone and with decrease in Metoprolol. He is less SOB with exertion and feeling of \"chest tightness\" have improved. He is also less tired. He did not complete EB evaluation yet.   He admits to feeling SOB with climbing 2 flights of stairs. He experiences chest tightness & lightheadedness only when running/rushing at work - when HR is racing. No syncope.     Since last visit he underwent Stress Echo with CPX - He exercised for 8:23 min (10.4 METs) , achieved Peak exercise HR of 148 beats/min and /70 mmHg. He did experience chest tightness and SOB at peak exercise. No significant LVOT obstruction at rest or with exercise.  He has Peak VO2: 19.9 ml/kg/min and Peak VO2 Predicted: 43 % - CPX consistent with severe cardiac impairment and chronotropic incompetence.     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    NYHA II    Current medical therapy: Toprol 50 mg BID, Jardiance 10 mg, Spironolactone 25mg,     HCM History:    Elder Del Valle is a 25 year old  male with HCM diagnosed in 3/2024 after presenting with lightheadedness/dizziness/chest tightness to urgent care.     ECG was abnormal showing LVH and TWI at that time. He was then sent to ER where Troponin I  was elevated at 412 ng/L and NT-pBNP was also increased at 278 pg/ml. He was hospitalized from 3/1/2024-3/3/2024 where TTE showed severely increased septal LV wall thickness=2.0 cm, LVEF=69%,No left ventricular regional wall motion abnormalities.     He was discharged and evaluated by  who referred him to HCM clinic. He was started on Toprol 25mg BID which was  eventually increased to 100mg BID.     CMR confirmed severe asymmetric left ventricular hypertrophy predominantly involvingbasal to mid anterior wall and the septum with maximum thickness of 24 mm  in the basal septum.Hazy mid wall nonspecific, nonischemic type delayed hyperenhancement in the basal to mid anterior septum.Scar burden is estimated at less than 3% of the total myocardium.    Additional history: Asthma since childhood    He recalls not having any physical limitations as a child in grade school. In middle school/HS he noted increased exertional SOB which was attributed to asthma when running around.     Currently,he works a third shift and is mostly standing on his feet. He notes episodes of lightheadedness, chest tightness and exertional dyspnea. He becomes \"winded easily\" when lifting bags at work. He also is fatigued - more with increase in Metoprolol to 100mg BID. He admits to coming from work and having trouble falling asleep due to these symptoms.     He also experiences palpitations that he describes as a heart skipping a beat. No associated syncope. +Snoring     The patient currently has NYHA Class 2 symptoms with a diagnosis of symptomatic nonobstructive HCM with preserved EF.      The patient complaints of Dyspnea: Yes. Chest pain: Yes.  Palpitations: Yes    He is accompanied by his mom who is a nurse at the [employer].     Device: None     AFIB: The patient does not have a history of atrial fibrillation. The patient is not on anticoagulation.    NSVT: The patient does not have  ICD for  primary prevention of SCD.    FAMILY History: The patient  does nothave a family history of HCM and does not have a known family genetic marker for HCM.  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 Normal echo   Siblings: Brother (31 yo) and sister (24yo)- brother has autoimmune disease  No children     No Family history of SCD or HCM     Current medical therapy: Toprol 100 mg BID    SCA Risk factors:    SYNCOPE: No    WALL THICKNESS >30MM: No (27 mm)    >2 episodes of NSVT over 200bpm by ambulatory monitor:No     LVEF less than 50%: No    >15% fibrosis by MRI: No (~5%)    Family history of sudden death in a family member with HCM:No    IMAGING:     Echo: 6/12/2024    * Nonobstructive Hypertrophic Cardiomyopathy. Reverse Septal Curvature. Max LV wall thickness=27 mm.    * Normal left ventricular size and function, EF 68% ; GLS -15%.    * Grade II left ventricular diastolic dysfunction.    * Normal right ventricular size and function, TAPSE = 1.8 cm. RV GLS -18%.    * No significant valve abnormalities.    * No pericardial effusion.    * Compared to the prior study on (3.2.2024), there are no significant changes    Stress echo - CPX: 08/12/2024    * No echocardiographic evidence of ischemia.    * Fair functional capacity for age, achieving 10.4 METs at 5:23 on the Toy Protocol.    * No significant ECG changes with exercise.    * No significant LVOT obstruction at rest or with exercise.    Exercise and Hemodynamics:  Exercise Protocol: Toy Treadmill  Total Exercise Time: 8:23 min  METS: 5.7  Anaerobic Threshold Reached: Yes  Reason for Termination: Fatigue  Other symptoms: Chest Pain \"7\"/10; baseline \"3\"/10  Heart Rate Response: Rest - 73 beats/min  Peak exercise - 148 beats/min  BP Response: Rest - 124/70 mmHg  Peak exercise - 144/70 mmHg     Cardiac and Pulmonary Diagnostics:  Peak VO2: 19.9 ml/kg/min  Peak VO2 Predicted: 43 %  Beta Blocker: Yes  Peak RER: 1.05  Exercise Effort: good  VE/VCO2 slope: 31.1  Oxygen pulse: 15.1  Oxygen Saturation: Rest 98 % & Peak 99 %     Resting ECG: Normal sinus rhythm, meets voltage criteria for left ventricular hypertrophy, diffuse ST abnormality    Exercise ECG:  Non-diagnostic ECG response due to resting abnormalities. There was no development of ectopy or sustained arrhythmia.    This is a diagnostic study. Maximal test; good effort  Stress ECG is uninterpretable for ischemia.  Exercise capacity shows severe limitation.  Cardiac diagnostic results at peak exercise with current functional ability is consistent with severe cardiac output impairment.  Chronotropic incompetence with heart rate reserve index 0.62 (normal >0.80)     Holter: 6/12/2024  Patient monitored for 5d 3h, analyzable time was 5d 3h starting on 06/12/2024 11:45 am.  Primary rhythm was Sinus Rhythm. Average heart rate was 77 bpm, Minimum heart rate was 44 bpm on Day 6 / 01:50:20  pm, Max heart rate was 120 bpm on Day 3 / 03:31:16 pm  PVC(s): Roberts was < 0.01 %, 5 total PVC(s), 2 disparate morphologies  Patient recorded 12 event(s) during the monitoring period, mostly in sinus rhythm and sinus tachycardia       CMR:05/22/2024  1.  Severe asymmetric left ventricular hypertrophy predominantly involvingbasal to mid anterior wall and the septum with maximum thickness of 24 mm  in the basal septum.  2.  Questionable hazy mid wall nonspecific, nonischemic type delayed hyperenhancement in the basal to mid anterior septum. This could suggest  early fibrosis. Scar burden is estimated at less than 3% of the total myocardium.  3.  Preserved global ECV. No edema on T2 mapping.  4.  Normal biventricular systolic function.  5.  The described findings are suggestive of hypertrophic cardiomyopathy.    PMH:  Past Medical History:   Diagnosis Date    Anxiety     Asthma (CMD)     Attention deficit disorder     Chronic tonsillitis 07/11/2018    Depression     Fracture     left great toe     Gastroesophageal reflux disease     Headache(784.0)     Multiple environmental allergies     NSTEMI (non-ST elevated myocardial infarction)  (CMD) 03/01/2024    RAD (reactive airway disease) (CMD)     Sinusitis, chronic      allergy related     Tonsillar hypertrophy 07/11/2018       PSH:  Past Surgical History:   Procedure Laterality Date    Esophagogastroduodenoscopy  01/25/2016    Transient Schatzki's ring-Done at [place]     Tonsillectomy  07/12/2018    Tympanostomy tube placement         MEDICATIONS  Current Outpatient Medications   Medication Sig Dispense Refill    metoPROLOL succinate (TOPROL-XL) 50 MG 24 hr tablet Take 1 tablet by mouth in the morning and 1 tablet in the evening. 60 tablet 0    omeprazole (PriLOSEC) 40 MG capsule Take 1 capsule by mouth daily. 30 capsule 1    ivabradine (CORLANOR) 5 MG tablet Take 1 tablet by mouth 2 times a day for 4 days leading up to your CT scan.  Take your last tablet on the morning of your CT scan, one hour before you arrive. (Patient not taking: Reported on 9/23/2024) 9 tablet 0    fluticasone furoate (ARNUITY ELLIPTA) 100 MCG/ACT inhaler Inhale 1 puff into the lungs nightly. (Patient not taking: Reported on 9/23/2024) 30 each 3    busPIRone (BUSPAR) 10 MG tablet Take 1 tablet by mouth in the morning and 1 tablet in the evening. 180 tablet 1    empagliflozin (JARDIANCE) 10 MG tablet Take 1 tablet by mouth daily (before breakfast). 90 tablet 3    albuterol 108 (90 Base) MCG/ACT inhaler Inhale 2 puffs into the lungs every 4 hours as needed for Shortness of Breath or Wheezing. 8.5 g 11    spironolactone (ALDACTONE) 25 MG tablet Take 1 tablet by mouth daily. 30 tablet 3    cetirizine (ZYRTEC) 10 MG tablet Take 10 mg by mouth every morning.      fluticasone (FLONASE) 50 MCG/ACT nasal spray SHAKE LIQUID AND USE 2 SPRAYS IN EACH NOSTRIL DAILY (Patient not taking: Reported on 9/23/2024) 1 Bottle 0     No current facility-administered medications for this visit.       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   Social History     Socioeconomic History    Marital status: Single     Spouse name: Not on file    Number of children: Not on file    Years of education: Not on file    Highest  education level: Not on file   Occupational History    Not on file   Tobacco Use    Smoking status: Never    Smokeless tobacco: Never   Vaping Use    Vaping status: never used   Substance and Sexual Activity    Alcohol use: No     Alcohol/week: 0.0 standard drinks of alcohol    Drug use: No    Sexual activity: Never   Other Topics Concern    Not on file   Social History Narrative    Not on file     Social Determinants of Health     Financial Resource Strain: Low Risk  (3/2/2024)    Financial Resource Strain     Unable to Get: None   Food Insecurity: Low Risk  (3/2/2024)    Food Insecurity     Worried about Food: Never true     Food is Gone: Never true   Transportation Needs: Not At Risk (3/2/2024)    Transportation Needs     Lack of Reliable Transportation: No   Physical Activity: Not on file   Stress: Not on file   Social Connections: Low Risk  (3/2/2024)    Social Connections     Social Connectivity: 5 or more times a week   Interpersonal Safety: Low Risk  (3/2/2024)    Interpersonal Safety     How often physically hurt: Never     How often insulted or talked down to: Never     How often threatened with harm: Never     How often scream or curse at: Never       FAMILY HISTORY:  As above    Mother (Nurse): No cardiac history - Had Echo that was normal   Father: No cardiac history. Normal echo   Siblings: Brother (31 yo) and sister (22yo)- brother has autoimmune disease  No children   Family History   Problem Relation Age of Onset    Asthma Mother     Musculoskeletal Father         back issues- surgery and pain pump     Patient is unaware of any medical problems Sister     Patient is unaware of any medical problems Brother     Diabetes Maternal Grandmother        ALLERGY:    ALLERGIES:   Allergen Reactions    Cat Dander PRURITUS     Watery eyes, nasal congestion     Dust PRURITUS     Watery eyes , nasal congestion       Grass PRURITUS     Watery eyes , nasal congestion      Trees PRURITUS     Watery eyes , nasal  congestion      Amoxicillin RASH       PHYSICAL EXAM  Visit Vitals  BP (!) 140/72   Pulse 87   Wt 113.9 kg (251 lb)   SpO2 99%   BMI 35.01 kg/m²       General:  Appears stated age, no acute distress  Eyes:  Conjunctivae without exudates or hemorrhage.  Pupils symmetric.  Neck:  No JVD.  No thyroid enlargement  Cardiac: Normal S1 and S2, regular rate and rhythm.  2/6 systolic murmur at sternal border. No rubs or gallops.  Pedal pulses 2/2 bilaterally.  Lower extremities with trace evidence of edema.  Carotid pulses 2/2 bilaterally.  No bruits auscultated.  Pulmonary: Lungs clear to auscultation bilaterally.  No rales, rhonchi or wheezes.  No evidence of accessory muscle use. Good respiratory effort.  Abdomen:  Soft, nontender.  No masses palpated.  No hepatosplenomegaly.   Skin:  Warm without evidence of rashes, lesions or ulcerations  Psych:  Normal mood and affect.  Alert and oriented x3.     Review of Systems   Constitutional:    Negative for fever, chills, diaphoresis, activity change, appetite change, and unexpected weight change. +fatigue  HENT:    Negative for hearing loss, nosebleeds, congestion and neck pain.    Eyes:    Negative for photophobia and visual disturbance.   Respiratory:    Negative for apnea, cough, choking, wheezing and stridor.  + shortness of breath.   Cardiovascular:    Negative for near-syncope or syncope. No report of leg swelling.+chest tightness, palpitations, lightheadedness  Gastrointestinal:    Negative for vomiting, abdominal pain, diarrhea, constipation, blood in stool, abdominal distention and anal bleeding.   Genitourinary:    Negative for dysuria, frequency, hematuria and difficulty urinating.   Musculoskeletal:    Negative for myalgias, back pain, joint swelling, or gait problem.  Skin:    Negative for color change, pallor, rash and wound.   Neurological:    Negative for dizziness, vision changes, seizures, numbness, new motor or sensory deficits or headaches.   Hematological:     Negative for adenopathy. Does not bruise/bleed easily.   Psychiatric/Behavioral:    Negative for hallucinations, behavioral problems, confusion, sleep disturbance, decreased concentration and agitation. No report of mood problem.       LAB DATA:    Troponin I, High Sensitivity (ng/L)   Date Value   06/12/2024 168 (HH)     NT-proBNP (pg/mL)   Date Value   06/25/2024 189 (H)       ASSESSMENT:     Elder Del Valle is a 25 year old  male with HCM diagnosed in 3/2024 after presenting with lightheadedness/dizziness/chest tightness to urgent care.     1.Nonobstructive Hypertrophic Cardiomyopathy. Reverse Septal Curvature. Max LV wall thickness=27 mm. No LVOT/mid ventricular obstruction.   2.Normal left ventricular size and function, EF 68% ; Decreased GLS -15%.  3.Grade II left ventricular diastolic dysfunction.  4.Normal right ventricular size and function, TAPSE = 1.8 cm. RV GLS -18%.  5.CPX (2024): Peak VO2: 19.9 ml/kg/min and Peak VO2 Predicted: 43 % - CPX consistent with severe cardiac impairment and chronotropic incompetence.   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  He is on Toprol 50mg BID,  Spironolactone 25mg QD and Jardiance 10mg QD with inadequate control of symptoms. Further optimization is needed currently.    Overall NT-pBNP slightly improved 278->189 pg/ml.     Discussed Stress Echo-CPX results showing Peak VO2: 19.9 ml/kg/min and Peak VO2 Predicted: 43 % - CPX consistent with severe cardiac impairment and chronotropic incompetence. Discussed that his peak VO2 is <50% of predicted and  [facility] . At this time Elder would like to defer this and we will continue on monitoring for symptoms and change.     Given chest tightness/lightheadedness/and exertional dyspnea with racing HR - we will add Verapamil 120mg QD. He was advised to monitor for symptoms and let us know how he is feeling with addition of new medication.     2. Sudden cardiac death risk screening was performed and the patient does not have risk factors including family history of sudden cardiac death in first-degree relatives; syncope not consistent with vasovagal episodes or hypovolemia; LV septum >= 3 cm (But it is severely increased at 27 mm) ; EF under 50%; apical aneurysm; LGE > 15% LV mass on MRI or NSVT on ambulatory ECG monitor.    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     4.We will also plan to obtain Coronary CTA given chest tightness and elevated Troponin likely in a setting of HCM/LVH. This may require Ivabridine pre medications.    5.HCM patients require annual follow up that should include SCA surveillance with ECG, cardiac imaging (CMR or Echo) and an ambulatory monitor.    6.We discussed hyperlipidemia (, HDL 29, Trig 196 and elevated Lipo (a) 104 - discussed lifestyle and dietary adjustments - given metabolic syndrome we will plan on starting Crestor 10mg. Repeat Lipid Panel in 3 months. He is not diabetic.     7.He was advised to monitor BP. He was also counseled on low salt/DASH diet; he is not a smoker         COUNSELING:   We discussed al of the features of hypertrophic cardiomyopathy with Elder and his mother and I suggested a visit at the website [email or web address]. We discussed both obstructive and nonobstructive hypertrophic cardiomyopathy, the association both with atrial and ventricular arrhythmias and indications for ICD and septal reduction therapy. We discussed the genetic nature of the  condition as autosomal dominant disease. He understands that means a 50% transmission of the genes. They understand now that if we perform family screening via genetic testing and a positive result is obtained we can use that to exclude other family members and cardiac image those annually that carry the genetic marker. If we do not find a genetic marker for HCM, we require continued surveillance with imaging. Patients under 21 years of age require cardiac testing annually and patients older than 21 years of age less frequent testing but ongoing cardiac imaging 3-5 years. We discussed the need for continued surveillance for changes to the risk and type of HCM for the patient.     We discussed exercise and dietary recommendations. This includes weight loss recommendations, hydration and appropriate levels of exercise for the patient condition. We discussed our recommendations for ongoing survailance and follow up. Advised to avoid straining and lifting.       Sveta Joseph MD         DISPLAY PLAN FREE TEXT

## 2024-09-24 ENCOUNTER — NON-APPOINTMENT (OUTPATIENT)
Age: 74
End: 2024-09-24

## 2024-09-29 NOTE — ED ADULT NURSE REASSESSMENT NOTE - AS PAIN REST
0 (no pain/absence of nonverbal indicators of pain)
0 (no pain/absence of nonverbal indicators of pain)
Appropriate

## 2024-10-17 ENCOUNTER — APPOINTMENT (OUTPATIENT)
Dept: UROLOGY | Facility: CLINIC | Age: 74
End: 2024-10-17

## 2024-10-24 ENCOUNTER — APPOINTMENT (OUTPATIENT)
Dept: UROLOGY | Facility: CLINIC | Age: 74
End: 2024-10-24
Payer: MEDICARE

## 2024-10-24 ENCOUNTER — OUTPATIENT (OUTPATIENT)
Dept: OUTPATIENT SERVICES | Facility: HOSPITAL | Age: 74
LOS: 1 days | End: 2024-10-24
Payer: MEDICARE

## 2024-10-24 VITALS — SYSTOLIC BLOOD PRESSURE: 125 MMHG | DIASTOLIC BLOOD PRESSURE: 75 MMHG

## 2024-10-24 DIAGNOSIS — Z98.890 OTHER SPECIFIED POSTPROCEDURAL STATES: Chronic | ICD-10-CM

## 2024-10-24 DIAGNOSIS — Z90.49 ACQUIRED ABSENCE OF OTHER SPECIFIED PARTS OF DIGESTIVE TRACT: Chronic | ICD-10-CM

## 2024-10-24 DIAGNOSIS — Z98.49 CATARACT EXTRACTION STATUS, UNSPECIFIED EYE: Chronic | ICD-10-CM

## 2024-10-24 DIAGNOSIS — Z98.1 ARTHRODESIS STATUS: Chronic | ICD-10-CM

## 2024-10-24 DIAGNOSIS — C67.9 MALIGNANT NEOPLASM OF BLADDER, UNSPECIFIED: ICD-10-CM

## 2024-10-24 DIAGNOSIS — R35.0 FREQUENCY OF MICTURITION: ICD-10-CM

## 2024-10-24 PROCEDURE — 52000 CYSTOURETHROSCOPY: CPT

## 2024-10-25 DIAGNOSIS — C67.9 MALIGNANT NEOPLASM OF BLADDER, UNSPECIFIED: ICD-10-CM

## 2024-10-26 LAB — URINE CYTOLOGY: NORMAL

## 2024-10-29 DIAGNOSIS — N39.0 URINARY TRACT INFECTION, SITE NOT SPECIFIED: ICD-10-CM

## 2024-10-29 RX ORDER — LEVOFLOXACIN 500 MG/1
500 TABLET, FILM COATED ORAL DAILY
Qty: 10 | Refills: 0 | Status: ACTIVE | COMMUNITY
Start: 2024-10-29 | End: 1900-01-01

## 2024-11-15 DIAGNOSIS — N39.0 URINARY TRACT INFECTION, SITE NOT SPECIFIED: ICD-10-CM

## 2024-11-15 RX ORDER — CEFUROXIME AXETIL 500 MG/1
500 TABLET, FILM COATED ORAL
Qty: 14 | Refills: 0 | Status: COMPLETED | COMMUNITY
Start: 2024-11-15 | End: 2024-11-22

## 2024-11-20 PROCEDURE — 52000 CYSTOURETHROSCOPY: CPT

## 2024-11-21 ENCOUNTER — APPOINTMENT (OUTPATIENT)
Dept: DERMATOLOGY | Facility: CLINIC | Age: 74
End: 2024-11-21
Payer: MEDICARE

## 2024-11-21 VITALS — WEIGHT: 215 LBS | HEIGHT: 68 IN | BODY MASS INDEX: 32.58 KG/M2

## 2024-11-21 DIAGNOSIS — Z12.83 ENCOUNTER FOR SCREENING FOR MALIGNANT NEOPLASM OF SKIN: ICD-10-CM

## 2024-11-21 DIAGNOSIS — L27.1 LOCALIZED SKIN ERUPTION DUE TO DRUGS AND MEDICAMENTS TAKEN INTERNALLY: ICD-10-CM

## 2024-11-21 DIAGNOSIS — L81.4 OTHER MELANIN HYPERPIGMENTATION: ICD-10-CM

## 2024-11-21 DIAGNOSIS — D22.9 MELANOCYTIC NEVI, UNSPECIFIED: ICD-10-CM

## 2024-11-21 DIAGNOSIS — L57.0 ACTINIC KERATOSIS: ICD-10-CM

## 2024-11-21 PROCEDURE — 99213 OFFICE O/P EST LOW 20 MIN: CPT | Mod: 25

## 2024-11-21 PROCEDURE — 17000 DESTRUCT PREMALG LESION: CPT

## 2024-11-21 PROCEDURE — 17003 DESTRUCT PREMALG LES 2-14: CPT

## 2024-12-05 ENCOUNTER — APPOINTMENT (OUTPATIENT)
Dept: GASTROENTEROLOGY | Facility: CLINIC | Age: 74
End: 2024-12-05
Payer: MEDICARE

## 2024-12-05 VITALS
WEIGHT: 210 LBS | OXYGEN SATURATION: 96 % | HEIGHT: 68 IN | HEART RATE: 86 BPM | DIASTOLIC BLOOD PRESSURE: 79 MMHG | SYSTOLIC BLOOD PRESSURE: 123 MMHG | BODY MASS INDEX: 31.83 KG/M2

## 2024-12-05 DIAGNOSIS — R10.9 UNSPECIFIED ABDOMINAL PAIN: ICD-10-CM

## 2024-12-05 DIAGNOSIS — R93.2 ABNORMAL FINDINGS ON DIAGNOSTIC IMAGING OF LIVER AND BILIARY TRACT: ICD-10-CM

## 2024-12-05 DIAGNOSIS — Z86.0101 PERSONAL HISTORY OF ADENOMATOUS AND SERRATED COLON POLYPS: ICD-10-CM

## 2024-12-05 DIAGNOSIS — R74.8 ABNORMAL LEVELS OF OTHER SERUM ENZYMES: ICD-10-CM

## 2024-12-05 PROCEDURE — 99204 OFFICE O/P NEW MOD 45 MIN: CPT

## 2024-12-09 RX ORDER — SODIUM SULFATE, POTASSIUM SULFATE AND MAGNESIUM SULFATE 1.6; 3.13; 17.5 G/177ML; G/177ML; G/177ML
17.5-3.13-1.6 SOLUTION ORAL
Qty: 1 | Refills: 0 | Status: ACTIVE | COMMUNITY
Start: 2024-12-09 | End: 1900-01-01

## 2024-12-11 ENCOUNTER — OUTPATIENT (OUTPATIENT)
Dept: OUTPATIENT SERVICES | Facility: HOSPITAL | Age: 74
LOS: 1 days | End: 2024-12-11
Payer: MEDICARE

## 2024-12-11 ENCOUNTER — NON-APPOINTMENT (OUTPATIENT)
Age: 74
End: 2024-12-11

## 2024-12-11 ENCOUNTER — APPOINTMENT (OUTPATIENT)
Dept: ULTRASOUND IMAGING | Facility: CLINIC | Age: 74
End: 2024-12-11
Payer: MEDICARE

## 2024-12-11 DIAGNOSIS — Z98.890 OTHER SPECIFIED POSTPROCEDURAL STATES: Chronic | ICD-10-CM

## 2024-12-11 DIAGNOSIS — R74.8 ABNORMAL LEVELS OF OTHER SERUM ENZYMES: ICD-10-CM

## 2024-12-11 DIAGNOSIS — Z98.49 CATARACT EXTRACTION STATUS, UNSPECIFIED EYE: Chronic | ICD-10-CM

## 2024-12-11 DIAGNOSIS — Z90.49 ACQUIRED ABSENCE OF OTHER SPECIFIED PARTS OF DIGESTIVE TRACT: Chronic | ICD-10-CM

## 2024-12-11 DIAGNOSIS — Z98.1 ARTHRODESIS STATUS: Chronic | ICD-10-CM

## 2024-12-11 PROCEDURE — 76700 US EXAM ABDOM COMPLETE: CPT

## 2024-12-11 PROCEDURE — 76700 US EXAM ABDOM COMPLETE: CPT | Mod: 26

## 2024-12-13 DIAGNOSIS — R19.4 CHANGE IN BOWEL HABIT: ICD-10-CM

## 2024-12-21 LAB — GI PCR PANEL: NOT DETECTED

## 2024-12-30 ENCOUNTER — TRANSCRIPTION ENCOUNTER (OUTPATIENT)
Age: 74
End: 2024-12-30

## 2024-12-30 ENCOUNTER — APPOINTMENT (OUTPATIENT)
Dept: GASTROENTEROLOGY | Facility: HOSPITAL | Age: 74
End: 2024-12-30

## 2024-12-30 ENCOUNTER — RESULT REVIEW (OUTPATIENT)
Age: 74
End: 2024-12-30

## 2024-12-30 ENCOUNTER — OUTPATIENT (OUTPATIENT)
Dept: OUTPATIENT SERVICES | Facility: HOSPITAL | Age: 74
LOS: 1 days | End: 2024-12-30
Payer: MEDICARE

## 2024-12-30 VITALS
HEART RATE: 67 BPM | SYSTOLIC BLOOD PRESSURE: 106 MMHG | RESPIRATION RATE: 17 BRPM | DIASTOLIC BLOOD PRESSURE: 53 MMHG | OXYGEN SATURATION: 97 %

## 2024-12-30 VITALS — WEIGHT: 214.95 LBS | HEIGHT: 68 IN | TEMPERATURE: 98 F

## 2024-12-30 DIAGNOSIS — Z98.890 OTHER SPECIFIED POSTPROCEDURAL STATES: Chronic | ICD-10-CM

## 2024-12-30 DIAGNOSIS — Z98.49 CATARACT EXTRACTION STATUS, UNSPECIFIED EYE: Chronic | ICD-10-CM

## 2024-12-30 DIAGNOSIS — Z90.49 ACQUIRED ABSENCE OF OTHER SPECIFIED PARTS OF DIGESTIVE TRACT: Chronic | ICD-10-CM

## 2024-12-30 DIAGNOSIS — Z86.010 PERSONAL HISTORY OF COLON POLYPS: ICD-10-CM

## 2024-12-30 DIAGNOSIS — Z98.1 ARTHRODESIS STATUS: Chronic | ICD-10-CM

## 2024-12-30 PROCEDURE — 88305 TISSUE EXAM BY PATHOLOGIST: CPT | Mod: 26

## 2024-12-30 PROCEDURE — 45380 COLONOSCOPY AND BIOPSY: CPT

## 2024-12-30 PROCEDURE — 43239 EGD BIOPSY SINGLE/MULTIPLE: CPT

## 2024-12-30 PROCEDURE — 88305 TISSUE EXAM BY PATHOLOGIST: CPT

## 2024-12-30 PROCEDURE — 45380 COLONOSCOPY AND BIOPSY: CPT | Mod: PT

## 2024-12-30 DEVICE — NET RETRV ROT ROTH 2.5MMX230CM: Type: IMPLANTABLE DEVICE | Status: FUNCTIONAL

## 2024-12-30 NOTE — ASU PATIENT PROFILE, ADULT - FALL HARM RISK - UNIVERSAL INTERVENTIONS
Bed in lowest position, wheels locked, appropriate side rails in place/Call bell, personal items and telephone in reach/Instruct patient to call for assistance before getting out of bed or chair/Non-slip footwear when patient is out of bed/[place] to call system/Physically safe environment - no spills, clutter or unnecessary equipment/Purposeful Proactive Rounding/Room/bathroom lighting operational, light cord in reach

## 2024-12-30 NOTE — ASU DISCHARGE PLAN (ADULT/PEDIATRIC) - NS MD DC FALL RISK RISK
For information on Fall & Injury Prevention, visit: [email or web address] OR  [email or web address] OR  https://www.cdc.gov/steadi/patient.html

## 2024-12-30 NOTE — ASU DISCHARGE PLAN (ADULT/PEDIATRIC) - FINANCIAL ASSISTANCE
[facility] provides services at a reduced cost to those who are determined to be eligible through [facility]’s financial assistance program. Information regarding [facility]’s financial assistance program can be found by going to [email or web address] or by calling [phone].

## 2024-12-30 NOTE — ASU PATIENT PROFILE, ADULT - INTERNATIONAL TRAVEL
PHYSICAL THERAPY Treatment:    Date: 1/30/2020  Recorded diagnosis/complaint at time of admission:  There are no active hospital problems to display for this patient.    Co-morbidities:   Patient Active Problem List   Diagnosis   • Morbid obesity (CMS/HCC)   • Anxiety and depression   • Neuropathy involving both lower extremities   • Type 2 diabetes mellitus without complication, without long-term current use of insulin (CMS/HCC)   • Low back pain     Clinical Presentation: evolving clinical presentation with changing characteristics   Treatment Diagnosis: Impaired Motor Function/Muscle Performance, Impaired Mobility, Impaired Balance and Impaired Motor Function and Sensory Integration    Therapy Precautions:  Recommend ceiling lift for transfers to recliner chair     Activity: As tolerated and up with assist    Cognition: Alert and Braman x3      SUBJECTIVE:   Pt. Reported agreeable to therapy and eager to participate.    Pt's personal goal for therapy: not stated this session     Pain:  None Reported  None Observed      OBSERVATION:   Pt is utilizing Capped IV and external female catheter  Skin Integrity: Impaired, left hip wound, bandage covering, nurse managing (retained)  Edema: none  Collaboration with: Nurse Veronica, Rehab AidCornelia, SPT    Vital Signs: not warranted at this time    ROM (degrees):  UE: see OT note; overall WFL  LE: pt was able to voluntary activate right LE in supine this date with placement of pillows and linens - able to off-load foot from bed without external assist; PROM limited by increased tone/spasms. Occasional involuntary knee flexion noted throughout session. ; decreased tone and spasticity noted during session.     Strength (out of 5 in available AROM):  UE: see OT eval  LE: pt able to activate right LE in supine this date - see ROM section above    Neurological:  Sensation: impaired patient reports tingling in malia UE's and LE's, patient able to detect light touch in  left thigh and feels pressure on bottom of feet while weight bearing  Tone: impaired hypertonicity bilateral LE;  left LE > right; increased tone and spasticity noted in malia LEs   Vision: intact     Balance:       Static Sitting Balance: Pt sat edge of bed x30 minutes without ceiling lift sling. Rehab Aide posterior to patient, writer anterior to patient with stoop under patient feet for stability. Focus of session on dynamic therex while seated edge of bed. Occasionally requires min assist posteriorly for feedback and cues for midline position.     Outcome Measure:   not completed this date secondary to focus on other areas of concern/limitation       MOBILITY:    Bed:   Rolling: Total Assistance (tot A), mod assist x2  Supine to Sit: Total Assistance (tot A), max assist x1 at malia LEs, min - mod at trunk  Sit to Supine: Total Assistance (tot A), max assist x2  Reason for Assistance: requires increased time to complete, weakness, body weight support due to return to bed, verbal cues for sequencing, use of leg  to manage LEs off edge of bed, manual cues for trunk/LEs   Pt does have improved ability to facilitate upright posture.     Transfers:   Deferred secondary to focus of session on EOB activities     Ambulation:   Not addressed this session      Stairs:   Not addressed this session    Exercises:        EOB activities:     -x10 bilateral elbow flexion focusing on trunk activation back to midline.  - Pillow placed under elbow for weight bearing through malia UEs - once again focusing on trunk activation back to midline.   - x20 alternating UE elevation for strength and trunk control  - x10 bilateral UE elevation for strength and trunk control  - pt able to sit 1 minute and 30 seconds edge of bed without external support or malia UE support from patient   - Attempted seated UE push up edge of bed without success.     Standing Trials in Vital Go Tilt table Bed: (not addressed on 1/29/2020)     Duration Angle    1/26/2020 10 minutes + 10 minutes 45*, 50*                                                Activity Tolerance: limited by hypertonicity in malia LEs, generalized weakness      GOALS:     Rehab potential is good due to positive factors age, motivation level and negative factors co-morbidities    Review Date: 2/2/2020 Goals were reviewed on 1/26/20 and were adjusted appropriately based on patient's progress.  Goal review date was extended.   1. Patient will complete HEP with Minimal Assistance (min A).  Goal not met  2. Patient will complete bed mobility with Total Assistance (tot A), min assist x2.  Progressing toward  3. Patient will sit edge of bed x5 minutes with malia UE support on bed rails with min assist x2. Goal achieved 1/28/2020.   4. Updated sitting goal: Patient will sit edge of bed x15 minutes with min assist - CGA x1. Goal met: 1/30/2020  5. Updated sitting goal: Pt will sit edge of bed x20 minutes with supervision. Progressing towards.      Will adjust goals based on progress made during hospitalization.      PLAN OF CARE:    PT Frequency: 6 days/week  Duration: LOS or when goals met  Treatment/Interventions: Functional transfer training, Strengthening, ROM, Endurance training, Patient/Family training, Equipment eval/education, Bed mobility, Gait training, Wheelchair mobility     RECOMMENDATIONS/EDUCATION:    Learner: patient  Readiness to Learn: acceptance  Learning Method: Verbal  Barriers to Learning: no barriers apparent at this time  Learning Evaluation: Verbalizes understanding, Demonstrates understanding and Needs reinforcement  Teaching Content: Energy Conservation, Positioning, Equipment, Safety Awareness, Functional Mobility and Role of physical therapy in the hospital setting  Please reference the patient education activity for further information regarding the patient's learning assessment.  Equipment for discharge: to be determined - continuing to assess needs at this time       Order Status:  no order placed        Delivery Status: Equipment needs to be met at rehab facility     See doc flowsheet (PT assess/treat/goals/charges) for specific information regarding time spent with patient.    Additional service code taken for time collaborating with OT re: plan of care and vendor for potential future use of Tilt in space chair.     Treatment Plan for Next Session: EOB activities, supine stretching once returned to bed, bring aide    The plan of care and goals were established with the patient and she is in agreement.     ASSESSMENT:       Patient is displaying good progress as evidenced by increased tolerance to edge of bed sitting without external support. Dynamic activities performed without overt loss of balance. .  At this time the patient continues to demonstrate decreased range of motion, decreased strength, balance deficits, pain, decreased activity tolerance, decreased endurance, abnormal tone which is limiting the completion of all functional mobility.  Further skilled physical therapy is required to address these limitations in attempt to maximize the patient's independence.    Recommendation for Discharge: PT WI: Intensive therapy program(pending progress and ability to tolerate/participate )        Recommendations for Discharge: OT WI: Intensive therapy program           After today's session this therapist is recommending the above location for optimal discharge.  This recommendation is supported by pt currently requires total assist for all mobility and has previous high level of function.. Anticipate needs will be best met at inpatient rehab facility. Pt demonstrates improved activity tolerance and is very motivated to progress back to baseline mobility level. Will continue to monitor.         No

## 2024-12-30 NOTE — PRE PROCEDURE NOTE - PRE PROCEDURE EVALUATION
Attending Physician:        Bryn Vang MD                    Procedure:    Indication for Procedure: abdominal pain, history of polyps  ________________________________________________________  PAST MEDICAL & SURGICAL HISTORY:  Cervical radiculopathy      Cervical osteophyte      Cervical spinal stenosis      BPH (benign prostatic hyperplasia)  s/p UroLift      Enterococcus UTI  8/26/2021 treated with ABx--resolved, symptom free now      Epiploic appendagitis  ER visit 8/26/2021      RBBB      Hypothyroid      History of gross hematuria      Bladder tumor      [place] (hard of hearing)      History of appendectomy  lap      H/O transurethral resection of bladder tumor (TURBT)      H/O colonoscopy      S/P urological surgery      S/P endoscopy      S/P colonoscopic polypectomy      S/P cataract surgery      History of fusion of cervical spine        ALLERGIES:  No Known Allergies    HOME MEDICATIONS:  aspirin 81 mg oral tablet: orally once a day  cholecalciferol 50 mcg (2000 intl units) oral capsule: 1 cap(s) orally once a day (in the morning)  levothyroxine 88 mcg (0.088 mg) oral tablet: 1 tab(s) orally once a day (in the morning)  rosuvastatin 20 mg oral tablet: 1 orally once a day (at bedtime)  tamsulosin 0.4 mg oral capsule: 1 cap(s) orally once a day (at bedtime)    AICD/PPM: [ ] yes   [x ] no    PERTINENT LAB DATA:                      PHYSICAL EXAMINATION:    T(C): --  HR: --  BP: --  RR: --  SpO2: --    Constitutional: NAD  HEENT: PERRLA, EOMI,    Neck:  No JVD  Respiratory: CTAB/L  Cardiovascular: S1 and S2  Gastrointestinal: BS+, soft, NT/ND  Extremities: No peripheral edema  Neurological: A/O x 3, no focal deficits  Psychiatric: Normal mood, normal affect  Skin: No rashes    ASA Class: I [ ]  II [x ]  III [ ]  IV [ ]    COMMENTS:    The patient is a suitable candidate for the planned procedure unless box checked [ ]  No, explain:

## 2024-12-30 NOTE — ASU PATIENT PROFILE, ADULT - NSICDXPASTMEDICALHX_GEN_ALL_CORE_FT
PAST MEDICAL HISTORY:  Bladder tumor     BPH (benign prostatic hyperplasia) s/p UroLift    Cervical osteophyte     Cervical radiculopathy     Cervical spinal stenosis     Enterococcus UTI 8/26/2021 treated with ABx--resolved, symptom free now    Epiploic appendagitis ER visit 8/26/2021    History of gross hematuria     [place] (hard of hearing)     Hypothyroid     RBBB

## 2025-01-02 LAB — SURGICAL PATHOLOGY STUDY: SIGNIFICANT CHANGE UP

## 2025-01-07 ENCOUNTER — NON-APPOINTMENT (OUTPATIENT)
Age: 75
End: 2025-01-07

## 2025-01-09 ENCOUNTER — APPOINTMENT (OUTPATIENT)
Dept: UROLOGY | Facility: CLINIC | Age: 75
End: 2025-01-09
Payer: MEDICARE

## 2025-01-09 ENCOUNTER — OUTPATIENT (OUTPATIENT)
Dept: OUTPATIENT SERVICES | Facility: HOSPITAL | Age: 75
LOS: 1 days | End: 2025-01-09
Payer: MEDICARE

## 2025-01-09 DIAGNOSIS — Z98.890 OTHER SPECIFIED POSTPROCEDURAL STATES: Chronic | ICD-10-CM

## 2025-01-09 DIAGNOSIS — C67.9 MALIGNANT NEOPLASM OF BLADDER, UNSPECIFIED: ICD-10-CM

## 2025-01-09 DIAGNOSIS — Z98.1 ARTHRODESIS STATUS: Chronic | ICD-10-CM

## 2025-01-09 DIAGNOSIS — Z98.49 CATARACT EXTRACTION STATUS, UNSPECIFIED EYE: Chronic | ICD-10-CM

## 2025-01-09 DIAGNOSIS — Z90.49 ACQUIRED ABSENCE OF OTHER SPECIFIED PARTS OF DIGESTIVE TRACT: Chronic | ICD-10-CM

## 2025-01-09 DIAGNOSIS — R35.0 FREQUENCY OF MICTURITION: ICD-10-CM

## 2025-01-09 PROCEDURE — 52000 CYSTOURETHROSCOPY: CPT

## 2025-01-11 LAB
BACTERIA UR CULT: NORMAL
URINE CYTOLOGY: NORMAL

## 2025-01-17 DIAGNOSIS — C67.9 MALIGNANT NEOPLASM OF BLADDER, UNSPECIFIED: ICD-10-CM

## 2025-03-06 ENCOUNTER — NON-APPOINTMENT (OUTPATIENT)
Age: 75
End: 2025-03-06

## 2025-04-15 ENCOUNTER — APPOINTMENT (OUTPATIENT)
Dept: UROLOGY | Facility: CLINIC | Age: 75
End: 2025-04-15

## 2025-04-17 ENCOUNTER — APPOINTMENT (OUTPATIENT)
Dept: UROLOGY | Facility: CLINIC | Age: 75
End: 2025-04-17

## 2025-04-17 ENCOUNTER — OUTPATIENT (OUTPATIENT)
Dept: OUTPATIENT SERVICES | Facility: HOSPITAL | Age: 75
LOS: 1 days | End: 2025-04-17
Payer: MEDICARE

## 2025-04-17 VITALS — TEMPERATURE: 97.2 F | DIASTOLIC BLOOD PRESSURE: 63 MMHG | SYSTOLIC BLOOD PRESSURE: 148 MMHG | HEART RATE: 62 BPM

## 2025-04-17 DIAGNOSIS — Z98.890 OTHER SPECIFIED POSTPROCEDURAL STATES: Chronic | ICD-10-CM

## 2025-04-17 DIAGNOSIS — R35.0 FREQUENCY OF MICTURITION: ICD-10-CM

## 2025-04-17 DIAGNOSIS — Z98.1 ARTHRODESIS STATUS: Chronic | ICD-10-CM

## 2025-04-17 DIAGNOSIS — Z90.49 ACQUIRED ABSENCE OF OTHER SPECIFIED PARTS OF DIGESTIVE TRACT: Chronic | ICD-10-CM

## 2025-04-17 DIAGNOSIS — N39.0 URINARY TRACT INFECTION, SITE NOT SPECIFIED: ICD-10-CM

## 2025-04-17 DIAGNOSIS — Z98.49 CATARACT EXTRACTION STATUS, UNSPECIFIED EYE: Chronic | ICD-10-CM

## 2025-04-17 PROCEDURE — 52000 CYSTOURETHROSCOPY: CPT

## 2025-04-18 LAB — BACTERIA UR CULT: NORMAL

## 2025-04-21 ENCOUNTER — APPOINTMENT (OUTPATIENT)
Dept: CT IMAGING | Facility: CLINIC | Age: 75
End: 2025-04-21
Payer: MEDICARE

## 2025-04-21 ENCOUNTER — OUTPATIENT (OUTPATIENT)
Dept: OUTPATIENT SERVICES | Facility: HOSPITAL | Age: 75
LOS: 1 days | End: 2025-04-21
Payer: MEDICARE

## 2025-04-21 ENCOUNTER — RESULT REVIEW (OUTPATIENT)
Age: 75
End: 2025-04-21

## 2025-04-21 ENCOUNTER — OUTPATIENT (OUTPATIENT)
Dept: OUTPATIENT SERVICES | Facility: HOSPITAL | Age: 75
LOS: 1 days | End: 2025-04-21

## 2025-04-21 VITALS
HEART RATE: 59 BPM | HEIGHT: 67.5 IN | TEMPERATURE: 98 F | OXYGEN SATURATION: 97 % | RESPIRATION RATE: 16 BRPM | WEIGHT: 223.99 LBS | SYSTOLIC BLOOD PRESSURE: 124 MMHG | DIASTOLIC BLOOD PRESSURE: 68 MMHG

## 2025-04-21 DIAGNOSIS — Z98.890 OTHER SPECIFIED POSTPROCEDURAL STATES: Chronic | ICD-10-CM

## 2025-04-21 DIAGNOSIS — E03.9 HYPOTHYROIDISM, UNSPECIFIED: ICD-10-CM

## 2025-04-21 DIAGNOSIS — C67.9 MALIGNANT NEOPLASM OF BLADDER, UNSPECIFIED: ICD-10-CM

## 2025-04-21 DIAGNOSIS — Z98.1 ARTHRODESIS STATUS: Chronic | ICD-10-CM

## 2025-04-21 DIAGNOSIS — Z90.49 ACQUIRED ABSENCE OF OTHER SPECIFIED PARTS OF DIGESTIVE TRACT: Chronic | ICD-10-CM

## 2025-04-21 DIAGNOSIS — Z98.49 CATARACT EXTRACTION STATUS, UNSPECIFIED EYE: Chronic | ICD-10-CM

## 2025-04-21 PROCEDURE — 74178 CT ABD&PLV WO CNTR FLWD CNTR: CPT | Mod: 26

## 2025-04-21 PROCEDURE — 74178 CT ABD&PLV WO CNTR FLWD CNTR: CPT

## 2025-04-21 NOTE — H&P PST ADULT - HISTORY OF PRESENT ILLNESS
73 year old male, PMH of HLD, urolift (2017) cervical spine stenosis  s/p ACDF ( 2021),  bladder cancer - status post TURBT in 4/2023 and 2/28/24, Cystoscopy ,Ureteroscopy Possible Buccal Mucosa Graft 7/17/24 h/o Frequent UTI but no UTI since Dec 2024- On Cephalexin 250 mg daily and preop dx of malignant neoplasm of bladder unspecified presents to have PST eval for Cystoscopy Transurethral resection of bladder tumor    Patient states he had routine follow up with Dr Mccrary 4/17/25 and was then scheduled for the procedure.

## 2025-04-21 NOTE — H&P PST ADULT - NSICDXFAMILYHX_GEN_ALL_CORE_FT
FAMILY HISTORY:  Sibling  Still living? Yes, Estimated age: 61-70  FH: type 2 diabetes, Age at diagnosis: Age Unknown    Grandparent  Still living? No  FH: stomach cancer, Age at diagnosis: Age Unknown

## 2025-04-21 NOTE — H&P PST ADULT - GENITOURINARY COMMENTS
patient reports of bladder cancer,- multiple cystoscopies,  TURBT x 2, h/o Chronic UTI, No ?C on 4/17/25- No growth pre op diagnosis-Malignant neoplasm of bladder

## 2025-04-21 NOTE — H&P PST ADULT - NEUROLOGICAL
cranial nerves II-XII intact/sensation intact/responds to pain/responds to verbal commands/no spontaneous movement details… sensation intact/responds to pain/responds to verbal commands/no spontaneous movement

## 2025-04-21 NOTE — H&P PST ADULT - NECK
supple/symmetric/no tracheal deviation Limited extension of neck h/o Cervical spinal fusion/symmetric/no tracheal deviation

## 2025-04-21 NOTE — H&P PST ADULT - ASSESSMENT
Writer spoke with patient and scheduled for appt with Joel Garcia NP as no availability on Dr. Mcgowan schedule before 10/21/23 when patient needs letter    Malignant neoplasm of bladder

## 2025-04-21 NOTE — H&P PST ADULT - NSICDXPASTMEDICALHX_GEN_ALL_CORE_FT
PAST MEDICAL HISTORY:  Bladder tumor     BPH (benign prostatic hyperplasia) s/p UroLift    Cervical osteophyte     Cervical radiculopathy     Cervical spinal stenosis     Enterococcus UTI 8/26/2021 treated with ABx--resolved, symptom free now    Epiploic appendagitis ER visit 8/26/2021    History of gross hematuria     [place] (hard of hearing)     Hypothyroid     Malignant neoplasm of bladder     RBBB

## 2025-04-21 NOTE — H&P PST ADULT - NSICDXPASTSURGICALHX_GEN_ALL_CORE_FT
PAST SURGICAL HISTORY:  H/O colonoscopy     H/O transurethral resection of bladder tumor (TURBT)     H/O transurethral resection of bladder tumor (TURBT)     History of appendectomy lap    History of fusion of cervical spine     S/P cataract surgery     S/P colonoscopic polypectomy     S/P endoscopy     S/P urological surgery

## 2025-04-21 NOTE — H&P PST ADULT - PROBLEM SELECTOR PLAN 1
Scheduled for Cystoscopy Transurethral Resection of Bladder Tumor on 4/23/25.  Pre-op instructions provided. Pt given verbal and written instructions with pepcid. Pt verbalized understanding.  Urine Culture result in chart- No Growth (4/17/25)

## 2025-04-22 VITALS
HEART RATE: 51 BPM | WEIGHT: 224.87 LBS | RESPIRATION RATE: 16 BRPM | TEMPERATURE: 99 F | OXYGEN SATURATION: 98 % | DIASTOLIC BLOOD PRESSURE: 65 MMHG | SYSTOLIC BLOOD PRESSURE: 128 MMHG

## 2025-04-22 PROBLEM — C67.9 MALIGNANT NEOPLASM OF BLADDER, UNSPECIFIED: Chronic | Status: ACTIVE | Noted: 2025-04-21

## 2025-04-22 LAB — URINE CYTOLOGY: NORMAL

## 2025-04-23 ENCOUNTER — TRANSCRIPTION ENCOUNTER (OUTPATIENT)
Age: 75
End: 2025-04-23

## 2025-04-23 ENCOUNTER — OUTPATIENT (OUTPATIENT)
Dept: OUTPATIENT SERVICES | Facility: HOSPITAL | Age: 75
LOS: 1 days | End: 2025-04-23
Payer: MEDICARE

## 2025-04-23 ENCOUNTER — RESULT REVIEW (OUTPATIENT)
Age: 75
End: 2025-04-23

## 2025-04-23 ENCOUNTER — APPOINTMENT (OUTPATIENT)
Dept: UROLOGY | Facility: AMBULATORY SURGERY CENTER | Age: 75
End: 2025-04-23

## 2025-04-23 VITALS
RESPIRATION RATE: 12 BRPM | HEART RATE: 62 BPM | DIASTOLIC BLOOD PRESSURE: 67 MMHG | SYSTOLIC BLOOD PRESSURE: 128 MMHG | TEMPERATURE: 98 F | OXYGEN SATURATION: 99 %

## 2025-04-23 DIAGNOSIS — Z90.49 ACQUIRED ABSENCE OF OTHER SPECIFIED PARTS OF DIGESTIVE TRACT: Chronic | ICD-10-CM

## 2025-04-23 DIAGNOSIS — Z98.890 OTHER SPECIFIED POSTPROCEDURAL STATES: Chronic | ICD-10-CM

## 2025-04-23 DIAGNOSIS — Z98.1 ARTHRODESIS STATUS: Chronic | ICD-10-CM

## 2025-04-23 DIAGNOSIS — C67.9 MALIGNANT NEOPLASM OF BLADDER, UNSPECIFIED: ICD-10-CM

## 2025-04-23 DIAGNOSIS — Z98.49 CATARACT EXTRACTION STATUS, UNSPECIFIED EYE: Chronic | ICD-10-CM

## 2025-04-23 PROCEDURE — 52234 CYSTOSCOPY AND TREATMENT: CPT

## 2025-04-23 PROCEDURE — 88305 TISSUE EXAM BY PATHOLOGIST: CPT | Mod: 26

## 2025-04-23 PROCEDURE — 88307 TISSUE EXAM BY PATHOLOGIST: CPT | Mod: 26

## 2025-04-23 DEVICE — GUIDEWIRE SENSOR DUAL-FLEX NITINOL STRAIGHT .038" X 150CM
Type: IMPLANTABLE DEVICE | Status: NON-FUNCTIONAL
Removed: 2025-04-23

## 2025-04-23 NOTE — ASU DISCHARGE PLAN (ADULT/PEDIATRIC) - ASU DC SPECIAL INSTRUCTIONSFT
GENERAL: It is common to have blood in your urine after your procedure. It may be pink or even red; inform your doctor if you have a significant amount of clot in the urine or if you are unable to void at all or if your catheter stops draining. The urine may clear and then become bloody again especially as you are more physically active. It is not uncommon to have some burning when you urinate, this will gradually improve.   BATHING: You may shower or bathe. If going home with esteban, shower only until catheter is removed.  DIET: You may resume your regular diet and regular medication regimen.  PAIN: You may take Tylenol (acetaminophen) 650-975mg and/or Motrin (ibuprofen) 400-600mg, both available over the counter, for pain every 6 hours as needed. Do not exceed 4000mg of Tylenol (acetaminophen) daily. You may alternate these medications such that you take one or the other every 3 hours for around the clock pain coverage.  STOOL SOFTENERS: Do not allow yourself to become constipated as straining may cause bleeding. Take stool softeners or a laxative (ex. Miralax, Colace, Senokot, ExLax, etc), available over the counter, if needed.  ACTIVITY: No heavy lifting or strenuous exercise until you are evaluated at your post-operative appointment. Otherwise, you may return to your usual level of physical activity.  FOLLOW-UP: If you did not already schedule your post-operative appointment, please call your urologist to schedule a follow-up appointment. Dr. Mccrary will call with pathology results and when to follow-up   CALL YOUR UROLOGIST IF: You have any bleeding that does not stop, inability to void >8 hours, fever over 100.4 F, chills, persistent nausea/vomiting, changes in your incision concerning for infection, or if your pain is not controlled on your discharge pain medications.

## 2025-04-23 NOTE — ASU DISCHARGE PLAN (ADULT/PEDIATRIC) - CARE PROVIDER_API CALL
Kin Mccrary.  Urology  [address] [place] [address], [address] [address]  Phone: [phone]  Fax: [phone]  Follow Up Time:

## 2025-04-23 NOTE — BRIEF OPERATIVE NOTE - NSICDXBRIEFOPLAUNCH_GEN_ALL_CORE
Notified Dr. Marisela Lawton about patient complaining of pain and change in respiratory status. Oxycodone given as ordered along with tylenol with no relief. Received new orders. Will administer as ordered. <--- Click to Launch ICDx for PreOp, PostOp and Procedure

## 2025-04-23 NOTE — ASU DISCHARGE PLAN (ADULT/PEDIATRIC) - FINANCIAL ASSISTANCE
[facility] provides services at a reduced cost to those who are determined to be eligible through [facility]’s financial assistance program. Information regarding [facility]’s financial assistance program can be found by going to [email or web address] or by calling [phone].

## 2025-04-28 DIAGNOSIS — N39.0 URINARY TRACT INFECTION, SITE NOT SPECIFIED: ICD-10-CM

## 2025-04-28 DIAGNOSIS — C67.9 MALIGNANT NEOPLASM OF BLADDER, UNSPECIFIED: ICD-10-CM

## 2025-04-28 LAB — SURGICAL PATHOLOGY STUDY: SIGNIFICANT CHANGE UP

## 2025-05-06 ENCOUNTER — NON-APPOINTMENT (OUTPATIENT)
Age: 75
End: 2025-05-06

## 2025-05-27 ENCOUNTER — APPOINTMENT (OUTPATIENT)
Dept: UROLOGY | Facility: CLINIC | Age: 75
End: 2025-05-27
Payer: MEDICARE

## 2025-05-27 ENCOUNTER — OUTPATIENT (OUTPATIENT)
Dept: OUTPATIENT SERVICES | Facility: HOSPITAL | Age: 75
LOS: 1 days | End: 2025-05-27
Payer: MEDICARE

## 2025-05-27 VITALS — RESPIRATION RATE: 16 BRPM | DIASTOLIC BLOOD PRESSURE: 61 MMHG | HEART RATE: 66 BPM | SYSTOLIC BLOOD PRESSURE: 129 MMHG

## 2025-05-27 DIAGNOSIS — Z90.49 ACQUIRED ABSENCE OF OTHER SPECIFIED PARTS OF DIGESTIVE TRACT: Chronic | ICD-10-CM

## 2025-05-27 DIAGNOSIS — Z98.890 OTHER SPECIFIED POSTPROCEDURAL STATES: Chronic | ICD-10-CM

## 2025-05-27 DIAGNOSIS — Z98.1 ARTHRODESIS STATUS: Chronic | ICD-10-CM

## 2025-05-27 DIAGNOSIS — Z98.49 CATARACT EXTRACTION STATUS, UNSPECIFIED EYE: Chronic | ICD-10-CM

## 2025-05-27 DIAGNOSIS — R35.0 FREQUENCY OF MICTURITION: ICD-10-CM

## 2025-05-27 PROCEDURE — 51700 IRRIGATION OF BLADDER: CPT

## 2025-05-27 RX ORDER — HYOSCYAMINE SULFATE 0.12 MG/1
0.12 TABLET, ORALLY DISINTEGRATING ORAL
Refills: 0 | Status: COMPLETED | OUTPATIENT
Start: 2025-05-27

## 2025-05-27 RX ORDER — MITOMYCIN 40 MG/80ML
40 INJECTION, POWDER, LYOPHILIZED, FOR SOLUTION INTRAVENOUS
Qty: 1 | Refills: 0 | Status: COMPLETED | OUTPATIENT
Start: 2025-05-27

## 2025-05-27 RX ORDER — MITOMYCIN 5 MG/10ML
40 INJECTION, POWDER, LYOPHILIZED, FOR SOLUTION INTRAVENOUS ONCE
Refills: 0 | Status: DISCONTINUED | OUTPATIENT
Start: 2025-05-27 | End: 2025-06-10

## 2025-05-27 RX ADMIN — MITOMYCIN 0 MG: 40 INJECTION, POWDER, LYOPHILIZED, FOR SOLUTION INTRAVENOUS at 00:00

## 2025-05-27 RX ADMIN — HYOSCYAMINE SULFATE 0 MG: 0.12 TABLET SUBLINGUAL at 00:00

## 2025-05-28 DIAGNOSIS — C67.9 MALIGNANT NEOPLASM OF BLADDER, UNSPECIFIED: ICD-10-CM

## 2025-06-02 ENCOUNTER — NON-APPOINTMENT (OUTPATIENT)
Age: 75
End: 2025-06-02

## 2025-06-03 ENCOUNTER — APPOINTMENT (OUTPATIENT)
Dept: UROLOGY | Facility: CLINIC | Age: 75
End: 2025-06-03
Payer: MEDICARE

## 2025-06-03 ENCOUNTER — OUTPATIENT (OUTPATIENT)
Dept: OUTPATIENT SERVICES | Facility: HOSPITAL | Age: 75
LOS: 1 days | End: 2025-06-03
Payer: MEDICARE

## 2025-06-03 VITALS — SYSTOLIC BLOOD PRESSURE: 124 MMHG | HEART RATE: 87 BPM | DIASTOLIC BLOOD PRESSURE: 64 MMHG

## 2025-06-03 VITALS — HEART RATE: 68 BPM | DIASTOLIC BLOOD PRESSURE: 76 MMHG | SYSTOLIC BLOOD PRESSURE: 120 MMHG

## 2025-06-03 DIAGNOSIS — Z98.49 CATARACT EXTRACTION STATUS, UNSPECIFIED EYE: Chronic | ICD-10-CM

## 2025-06-03 DIAGNOSIS — Z90.49 ACQUIRED ABSENCE OF OTHER SPECIFIED PARTS OF DIGESTIVE TRACT: Chronic | ICD-10-CM

## 2025-06-03 DIAGNOSIS — Z98.890 OTHER SPECIFIED POSTPROCEDURAL STATES: Chronic | ICD-10-CM

## 2025-06-03 DIAGNOSIS — C67.9 MALIGNANT NEOPLASM OF BLADDER, UNSPECIFIED: ICD-10-CM

## 2025-06-03 DIAGNOSIS — Z98.1 ARTHRODESIS STATUS: Chronic | ICD-10-CM

## 2025-06-03 DIAGNOSIS — R35.0 FREQUENCY OF MICTURITION: ICD-10-CM

## 2025-06-03 PROCEDURE — 51720 TREATMENT OF BLADDER LESION: CPT

## 2025-06-03 RX ORDER — MITOMYCIN 40 MG/80ML
40 INJECTION, POWDER, LYOPHILIZED, FOR SOLUTION INTRAVENOUS
Qty: 1 | Refills: 0 | Status: COMPLETED | OUTPATIENT
Start: 2025-06-03

## 2025-06-03 RX ORDER — HYOSCYAMINE SULFATE 0.12 MG/1
0.12 TABLET, ORALLY DISINTEGRATING ORAL
Refills: 0 | Status: COMPLETED | OUTPATIENT
Start: 2025-06-03

## 2025-06-03 RX ORDER — MITOMYCIN 40 MG/80ML
40 INJECTION, POWDER, LYOPHILIZED, FOR SOLUTION INTRAVENOUS
Qty: 1 | Refills: 0 | Status: COMPLETED | OUTPATIENT
Start: 2025-06-03 | End: 2025-06-03

## 2025-06-03 RX ORDER — MITOMYCIN 5 MG/10ML
40 INJECTION, POWDER, LYOPHILIZED, FOR SOLUTION INTRAVENOUS ONCE
Refills: 0 | Status: DISCONTINUED | OUTPATIENT
Start: 2025-06-03 | End: 2025-06-17

## 2025-06-03 RX ADMIN — MITOMYCIN 0 MG: 40 INJECTION, POWDER, LYOPHILIZED, FOR SOLUTION INTRAVENOUS at 00:00

## 2025-06-03 RX ADMIN — HYOSCYAMINE SULFATE 1 MG: 0.12 TABLET SUBLINGUAL at 00:00

## 2025-06-10 ENCOUNTER — OUTPATIENT (OUTPATIENT)
Dept: OUTPATIENT SERVICES | Facility: HOSPITAL | Age: 75
LOS: 1 days | End: 2025-06-10
Payer: MEDICARE

## 2025-06-10 ENCOUNTER — APPOINTMENT (OUTPATIENT)
Dept: UROLOGY | Facility: CLINIC | Age: 75
End: 2025-06-10
Payer: MEDICARE

## 2025-06-10 VITALS — HEART RATE: 89 BPM | DIASTOLIC BLOOD PRESSURE: 56 MMHG | SYSTOLIC BLOOD PRESSURE: 146 MMHG

## 2025-06-10 VITALS — TEMPERATURE: 97.5 F | HEART RATE: 71 BPM | DIASTOLIC BLOOD PRESSURE: 61 MMHG | SYSTOLIC BLOOD PRESSURE: 121 MMHG

## 2025-06-10 DIAGNOSIS — Z98.890 OTHER SPECIFIED POSTPROCEDURAL STATES: Chronic | ICD-10-CM

## 2025-06-10 DIAGNOSIS — Z90.49 ACQUIRED ABSENCE OF OTHER SPECIFIED PARTS OF DIGESTIVE TRACT: Chronic | ICD-10-CM

## 2025-06-10 DIAGNOSIS — Z98.49 CATARACT EXTRACTION STATUS, UNSPECIFIED EYE: Chronic | ICD-10-CM

## 2025-06-10 DIAGNOSIS — Z98.1 ARTHRODESIS STATUS: Chronic | ICD-10-CM

## 2025-06-10 DIAGNOSIS — R35.0 FREQUENCY OF MICTURITION: ICD-10-CM

## 2025-06-10 PROCEDURE — 51720 TREATMENT OF BLADDER LESION: CPT

## 2025-06-10 RX ORDER — HYOSCYAMINE SULFATE 0.12 MG/1
0.12 TABLET, ORALLY DISINTEGRATING ORAL
Refills: 0 | Status: COMPLETED | OUTPATIENT
Start: 2025-06-10

## 2025-06-10 RX ORDER — MITOMYCIN 40 MG/80ML
40 INJECTION, POWDER, LYOPHILIZED, FOR SOLUTION INTRAVENOUS
Qty: 1 | Refills: 0 | Status: COMPLETED | OUTPATIENT
Start: 2025-06-10

## 2025-06-10 RX ORDER — MITOMYCIN 5 MG/10ML
40 INJECTION, POWDER, LYOPHILIZED, FOR SOLUTION INTRAVENOUS ONCE
Refills: 0 | Status: DISCONTINUED | OUTPATIENT
Start: 2025-06-10 | End: 2025-06-24

## 2025-06-10 RX ADMIN — HYOSCYAMINE SULFATE 1 MG: 0.12 TABLET SUBLINGUAL at 00:00

## 2025-06-10 RX ADMIN — MITOMYCIN 0 MG: 40 INJECTION, POWDER, LYOPHILIZED, FOR SOLUTION INTRAVENOUS at 00:00

## 2025-06-11 DIAGNOSIS — C67.9 MALIGNANT NEOPLASM OF BLADDER, UNSPECIFIED: ICD-10-CM

## 2025-06-17 ENCOUNTER — APPOINTMENT (OUTPATIENT)
Dept: UROLOGY | Facility: CLINIC | Age: 75
End: 2025-06-17

## 2025-06-17 ENCOUNTER — OUTPATIENT (OUTPATIENT)
Dept: OUTPATIENT SERVICES | Facility: HOSPITAL | Age: 75
LOS: 1 days | End: 2025-06-17
Payer: MEDICARE

## 2025-06-17 VITALS
SYSTOLIC BLOOD PRESSURE: 130 MMHG | OXYGEN SATURATION: 100 % | DIASTOLIC BLOOD PRESSURE: 80 MMHG | HEART RATE: 90 BPM | TEMPERATURE: 98 F | RESPIRATION RATE: 15 BRPM

## 2025-06-17 VITALS — HEART RATE: 78 BPM | SYSTOLIC BLOOD PRESSURE: 127 MMHG | DIASTOLIC BLOOD PRESSURE: 75 MMHG

## 2025-06-17 DIAGNOSIS — Z98.1 ARTHRODESIS STATUS: Chronic | ICD-10-CM

## 2025-06-17 DIAGNOSIS — Z98.890 OTHER SPECIFIED POSTPROCEDURAL STATES: Chronic | ICD-10-CM

## 2025-06-17 DIAGNOSIS — Z98.49 CATARACT EXTRACTION STATUS, UNSPECIFIED EYE: Chronic | ICD-10-CM

## 2025-06-17 DIAGNOSIS — R35.0 FREQUENCY OF MICTURITION: ICD-10-CM

## 2025-06-17 DIAGNOSIS — Z90.49 ACQUIRED ABSENCE OF OTHER SPECIFIED PARTS OF DIGESTIVE TRACT: Chronic | ICD-10-CM

## 2025-06-17 PROCEDURE — 51720 TREATMENT OF BLADDER LESION: CPT

## 2025-06-17 RX ORDER — MITOMYCIN 5 MG/10ML
40 INJECTION, POWDER, LYOPHILIZED, FOR SOLUTION INTRAVENOUS ONCE
Refills: 0 | Status: DISCONTINUED | OUTPATIENT
Start: 2025-06-17 | End: 2025-07-01

## 2025-06-17 RX ORDER — MITOMYCIN 40 MG/80ML
40 INJECTION, POWDER, LYOPHILIZED, FOR SOLUTION INTRAVENOUS
Qty: 1 | Refills: 0 | Status: COMPLETED | OUTPATIENT
Start: 2025-06-17

## 2025-06-17 RX ORDER — HYOSCYAMINE SULFATE 0.12 MG/1
0.12 TABLET, ORALLY DISINTEGRATING ORAL
Refills: 0 | Status: COMPLETED | OUTPATIENT
Start: 2025-06-17

## 2025-06-17 RX ADMIN — HYOSCYAMINE SULFATE 1 MG: 0.12 TABLET SUBLINGUAL at 00:00

## 2025-06-17 RX ADMIN — MITOMYCIN 0 MG: 40 INJECTION, POWDER, LYOPHILIZED, FOR SOLUTION INTRAVENOUS at 00:00

## 2025-06-24 ENCOUNTER — OUTPATIENT (OUTPATIENT)
Dept: OUTPATIENT SERVICES | Facility: HOSPITAL | Age: 75
LOS: 1 days | End: 2025-06-24
Payer: MEDICARE

## 2025-06-24 ENCOUNTER — APPOINTMENT (OUTPATIENT)
Dept: UROLOGY | Facility: CLINIC | Age: 75
End: 2025-06-24
Payer: MEDICARE

## 2025-06-24 VITALS — SYSTOLIC BLOOD PRESSURE: 105 MMHG | HEART RATE: 70 BPM | DIASTOLIC BLOOD PRESSURE: 67 MMHG

## 2025-06-24 DIAGNOSIS — Z90.49 ACQUIRED ABSENCE OF OTHER SPECIFIED PARTS OF DIGESTIVE TRACT: Chronic | ICD-10-CM

## 2025-06-24 DIAGNOSIS — Z98.890 OTHER SPECIFIED POSTPROCEDURAL STATES: Chronic | ICD-10-CM

## 2025-06-24 DIAGNOSIS — Z98.49 CATARACT EXTRACTION STATUS, UNSPECIFIED EYE: Chronic | ICD-10-CM

## 2025-06-24 DIAGNOSIS — R35.0 FREQUENCY OF MICTURITION: ICD-10-CM

## 2025-06-24 PROCEDURE — 51720 TREATMENT OF BLADDER LESION: CPT

## 2025-06-24 RX ORDER — MITOMYCIN 5 MG/10ML
40 INJECTION, POWDER, LYOPHILIZED, FOR SOLUTION INTRAVENOUS ONCE
Refills: 0 | Status: DISCONTINUED | OUTPATIENT
Start: 2025-06-24 | End: 2025-07-08

## 2025-06-24 RX ORDER — MITOMYCIN 40 MG/80ML
40 INJECTION, POWDER, LYOPHILIZED, FOR SOLUTION INTRAVENOUS
Qty: 1 | Refills: 0 | Status: COMPLETED | OUTPATIENT
Start: 2025-06-24

## 2025-06-24 RX ORDER — HYOSCYAMINE SULFATE 0.12 MG/1
0.12 TABLET, ORALLY DISINTEGRATING ORAL
Refills: 0 | Status: COMPLETED | OUTPATIENT
Start: 2025-06-24

## 2025-06-24 RX ADMIN — MITOMYCIN 0 MG: 40 INJECTION, POWDER, LYOPHILIZED, FOR SOLUTION INTRAVENOUS at 00:00

## 2025-06-24 RX ADMIN — HYOSCYAMINE SULFATE 1 MG: 0.12 TABLET SUBLINGUAL at 00:00

## 2025-06-25 DIAGNOSIS — C67.9 MALIGNANT NEOPLASM OF BLADDER, UNSPECIFIED: ICD-10-CM

## 2025-06-30 DIAGNOSIS — C67.9 MALIGNANT NEOPLASM OF BLADDER, UNSPECIFIED: ICD-10-CM

## 2025-07-01 ENCOUNTER — OUTPATIENT (OUTPATIENT)
Dept: OUTPATIENT SERVICES | Facility: HOSPITAL | Age: 75
LOS: 1 days | End: 2025-07-01
Payer: MEDICARE

## 2025-07-01 ENCOUNTER — APPOINTMENT (OUTPATIENT)
Dept: UROLOGY | Facility: CLINIC | Age: 75
End: 2025-07-01
Payer: MEDICARE

## 2025-07-01 VITALS
DIASTOLIC BLOOD PRESSURE: 62 MMHG | SYSTOLIC BLOOD PRESSURE: 99 MMHG | OXYGEN SATURATION: 100 % | RESPIRATION RATE: 16 BRPM | TEMPERATURE: 98.2 F | HEART RATE: 69 BPM

## 2025-07-01 VITALS — DIASTOLIC BLOOD PRESSURE: 66 MMHG | SYSTOLIC BLOOD PRESSURE: 120 MMHG | HEART RATE: 67 BPM

## 2025-07-01 DIAGNOSIS — R35.0 FREQUENCY OF MICTURITION: ICD-10-CM

## 2025-07-01 DIAGNOSIS — Z98.890 OTHER SPECIFIED POSTPROCEDURAL STATES: Chronic | ICD-10-CM

## 2025-07-01 DIAGNOSIS — Z98.49 CATARACT EXTRACTION STATUS, UNSPECIFIED EYE: Chronic | ICD-10-CM

## 2025-07-01 DIAGNOSIS — Z98.1 ARTHRODESIS STATUS: Chronic | ICD-10-CM

## 2025-07-01 DIAGNOSIS — Z90.49 ACQUIRED ABSENCE OF OTHER SPECIFIED PARTS OF DIGESTIVE TRACT: Chronic | ICD-10-CM

## 2025-07-01 PROCEDURE — 51720 TREATMENT OF BLADDER LESION: CPT

## 2025-07-01 RX ORDER — HYOSCYAMINE SULFATE 0.12 MG/1
0.12 TABLET, ORALLY DISINTEGRATING ORAL
Refills: 0 | Status: COMPLETED | OUTPATIENT
Start: 2025-07-01

## 2025-07-01 RX ORDER — MITOMYCIN 40 MG/80ML
40 INJECTION, POWDER, LYOPHILIZED, FOR SOLUTION INTRAVENOUS
Qty: 1 | Refills: 0 | Status: COMPLETED | OUTPATIENT
Start: 2025-07-01

## 2025-07-01 RX ORDER — MITOMYCIN 5 MG/10ML
40 INJECTION, POWDER, LYOPHILIZED, FOR SOLUTION INTRAVENOUS ONCE
Refills: 0 | Status: DISCONTINUED | OUTPATIENT
Start: 2025-07-01 | End: 2025-07-15

## 2025-07-01 RX ADMIN — MITOMYCIN 0 MG: 40 INJECTION, POWDER, LYOPHILIZED, FOR SOLUTION INTRAVENOUS at 00:00

## 2025-07-01 RX ADMIN — HYOSCYAMINE SULFATE 1 MG: 0.12 TABLET, ORALLY DISINTEGRATING ORAL at 00:00

## 2025-07-02 DIAGNOSIS — N35.919 UNSPECIFIED URETHRAL STRICTURE, MALE, UNSPECIFIED SITE: ICD-10-CM

## 2025-07-02 DIAGNOSIS — C67.9 MALIGNANT NEOPLASM OF BLADDER, UNSPECIFIED: ICD-10-CM

## 2025-08-21 ENCOUNTER — APPOINTMENT (OUTPATIENT)
Dept: UROLOGY | Facility: CLINIC | Age: 75
End: 2025-08-21

## 2025-08-21 ENCOUNTER — OUTPATIENT (OUTPATIENT)
Dept: OUTPATIENT SERVICES | Facility: HOSPITAL | Age: 75
LOS: 1 days | End: 2025-08-21
Payer: MEDICARE

## 2025-08-21 VITALS — DIASTOLIC BLOOD PRESSURE: 70 MMHG | HEART RATE: 76 BPM | SYSTOLIC BLOOD PRESSURE: 136 MMHG

## 2025-08-21 DIAGNOSIS — Z98.890 OTHER SPECIFIED POSTPROCEDURAL STATES: Chronic | ICD-10-CM

## 2025-08-21 DIAGNOSIS — Z90.49 ACQUIRED ABSENCE OF OTHER SPECIFIED PARTS OF DIGESTIVE TRACT: Chronic | ICD-10-CM

## 2025-08-21 DIAGNOSIS — N35.919 UNSPECIFIED URETHRAL STRICTURE, MALE, UNSPECIFIED SITE: ICD-10-CM

## 2025-08-21 DIAGNOSIS — Z98.1 ARTHRODESIS STATUS: Chronic | ICD-10-CM

## 2025-08-21 DIAGNOSIS — Z98.49 CATARACT EXTRACTION STATUS, UNSPECIFIED EYE: Chronic | ICD-10-CM

## 2025-08-21 DIAGNOSIS — R35.0 FREQUENCY OF MICTURITION: ICD-10-CM

## 2025-08-21 DIAGNOSIS — C67.9 MALIGNANT NEOPLASM OF BLADDER, UNSPECIFIED: ICD-10-CM

## 2025-08-21 PROCEDURE — 52000 CYSTOURETHROSCOPY: CPT

## 2025-08-25 ENCOUNTER — NON-APPOINTMENT (OUTPATIENT)
Age: 75
End: 2025-08-25

## 2025-09-02 ENCOUNTER — NON-APPOINTMENT (OUTPATIENT)
Age: 75
End: 2025-09-02

## 2025-09-04 ENCOUNTER — OUTPATIENT (OUTPATIENT)
Dept: OUTPATIENT SERVICES | Facility: HOSPITAL | Age: 75
LOS: 1 days | End: 2025-09-04
Payer: MEDICARE

## 2025-09-04 ENCOUNTER — APPOINTMENT (OUTPATIENT)
Dept: UROLOGY | Facility: CLINIC | Age: 75
End: 2025-09-04
Payer: MEDICARE

## 2025-09-04 VITALS
DIASTOLIC BLOOD PRESSURE: 71 MMHG | RESPIRATION RATE: 16 BRPM | HEART RATE: 64 BPM | SYSTOLIC BLOOD PRESSURE: 135 MMHG | TEMPERATURE: 98.6 F

## 2025-09-04 DIAGNOSIS — Z98.890 OTHER SPECIFIED POSTPROCEDURAL STATES: Chronic | ICD-10-CM

## 2025-09-04 DIAGNOSIS — Z98.49 CATARACT EXTRACTION STATUS, UNSPECIFIED EYE: Chronic | ICD-10-CM

## 2025-09-04 DIAGNOSIS — R35.0 FREQUENCY OF MICTURITION: ICD-10-CM

## 2025-09-04 DIAGNOSIS — N35.912 UNSPECIFIED BULBOUS URETHRAL STRICTURE, MALE: ICD-10-CM

## 2025-09-04 DIAGNOSIS — Z98.1 ARTHRODESIS STATUS: Chronic | ICD-10-CM

## 2025-09-04 DIAGNOSIS — Z90.49 ACQUIRED ABSENCE OF OTHER SPECIFIED PARTS OF DIGESTIVE TRACT: Chronic | ICD-10-CM

## 2025-09-04 PROCEDURE — 52000 CYSTOURETHROSCOPY: CPT

## 2025-09-04 PROCEDURE — 51610 INJECTION FOR BLADDER X-RAY: CPT

## 2025-09-05 DIAGNOSIS — N35.912 UNSPECIFIED BULBOUS URETHRAL STRICTURE, MALE: ICD-10-CM

## 2025-09-05 DIAGNOSIS — C67.9 MALIGNANT NEOPLASM OF BLADDER, UNSPECIFIED: ICD-10-CM

## (undated) DEVICE — FOLEY HOLDER STATLOCK 2 WAY ADULT

## (undated) DEVICE — SUT CHROMIC 2-0 30" V-20

## (undated) DEVICE — FOLEY CATH 3-WAY 20FR 30CC LATEX LUBRICATH

## (undated) DEVICE — PACK GENERAL MINOR

## (undated) DEVICE — TUBING RANGER FLUID IRRIGATION SET DISP

## (undated) DEVICE — ELCTR BOVIE TIP NEEDLE INSULATED 2.8" EDGE

## (undated) DEVICE — DRSG DERMABOND 0.7ML

## (undated) DEVICE — BAG URINE W METER 2L

## (undated) DEVICE — VENODYNE/SCD SLEEVE CALF LARGE

## (undated) DEVICE — SUT CHROMIC 4-0 30" C-13

## (undated) DEVICE — DRAPE 3/4 SHEET W REINFORCEMENT 56X77"

## (undated) DEVICE — POSITIONER PATIENT SAFETY STRAP 3X60"

## (undated) DEVICE — LABELS BLANK W PEN

## (undated) DEVICE — AMPLATZ RENAL DILATOR 6FR-30FR X 20CM

## (undated) DEVICE — PREP BETADINE 5% STERILE OPTHALMIC SOLUTION

## (undated) DEVICE — SUT POLYSORB 3-0 30" V-20 UNDYED

## (undated) DEVICE — ACMI SELF-SEALING SEAL UP TO 7FR

## (undated) DEVICE — PREP BETADINE KIT

## (undated) DEVICE — GLV 7.5 PROTEXIS (WHITE)

## (undated) DEVICE — IRRIGATOR BIO SHIELD

## (undated) DEVICE — STOPCOCK 4-WAY W SWIVEL MALE LUER LOCK NON VENTED RED CAP

## (undated) DEVICE — BIOPSY FORCEP RADIAL JAW 4 STANDARD WITH NEEDLE

## (undated) DEVICE — SUT PLAIN GUT 2-0 30" V-20

## (undated) DEVICE — SOL IRR BAG NS 0.9% 3000ML

## (undated) DEVICE — ELCTR GROUNDING PAD ADULT COVIDIEN

## (undated) DEVICE — DRSG CURITY GAUZE SPONGE 4 X 4" 12-PLY

## (undated) DEVICE — ELCTR BUGBEE FULGATING 5FR X 58CM

## (undated) DEVICE — TUBING TUR 2 PRONG

## (undated) DEVICE — ELCTR BUTTON

## (undated) DEVICE — FOLEY CATH 2-WAY 24FR 5CC LATEX HYDROGEL

## (undated) DEVICE — ELCTR CUTTING LOOP 24FR RIGHT ANGLE

## (undated) DEVICE — GOWN TRIMAX LG

## (undated) DEVICE — SUCTION YANKAUER NO CONTROL VENT

## (undated) DEVICE — ELCTR VAPORIZT GRV BLK

## (undated) DEVICE — ELCTR ROLLER BALL BLK 24-26FR

## (undated) DEVICE — SUT CHROMIC 3-0 30" V-20

## (undated) DEVICE — ELCTR CUTTING LOOP RIGHT ANGLE 24FR

## (undated) DEVICE — FOLEY TRAY 16FR LF URINE METER SURESTEP

## (undated) DEVICE — SOL IRR BAG H2O 3000ML

## (undated) DEVICE — DRSG TAPE TRANSPORE 1"

## (undated) DEVICE — CATH ELECROHEM GLD PROBE 7FR

## (undated) DEVICE — TUBING SUCTION CONN 6FT STERILE

## (undated) DEVICE — PACK IV START WITH CHG

## (undated) DEVICE — ELCTR GROOVED VAPOR

## (undated) DEVICE — FOLEY CATH 2-WAY 22FR 5CC LATEX COUNCIL RED

## (undated) DEVICE — CATH ELCTR GLD PRB 10FR

## (undated) DEVICE — DRAPE MAYO STAND 23"

## (undated) DEVICE — FOLEY CATH 3-WAY 24FR 5CC LATEX LUBRICATH

## (undated) DEVICE — GLV 6.5 PROTEXIS (WHITE)

## (undated) DEVICE — SOL INJ NS 0.9% 500ML 2 PORT

## (undated) DEVICE — PACK CYSTO

## (undated) DEVICE — DRAIN JACKSON PRATT 3 SPRING RESERVOIR W 10FR PVC DRAIN

## (undated) DEVICE — Device

## (undated) DEVICE — PREP CHLORAPREP HI-LITE ORANGE 26ML

## (undated) DEVICE — FOLEY CATH 2-WAY 18FR 5CC LATEX HYDROGEL

## (undated) DEVICE — LONE STAR ELASTIC STAY HOOK 12MM BLUNT

## (undated) DEVICE — LONE STAR RETRACTOR RING 32.5CM X 18.3CM DISP

## (undated) DEVICE — CABLE DAC ACTIVE CORD

## (undated) DEVICE — SUCTION YANKAUER TAPERED BULBOUS NO VENT

## (undated) DEVICE — TUBING CAP SET ENDO 24HR USE GI

## (undated) DEVICE — DRAPE TOWEL BLUE 17" X 24"

## (undated) DEVICE — FOLEY CATH 2-WAY 16FR 5CC LATEX COUNCIL RED

## (undated) DEVICE — PROTECTOR HEEL CONVOLUTED

## (undated) DEVICE — ELCTR PLASMA LOOP MEDIUM ANGLED 24FR 12-30 DEG

## (undated) DEVICE — BRUSH COLONOSCOPY CYTOLOGY

## (undated) DEVICE — SPECIMEN CONTAINER 100ML

## (undated) DEVICE — SYR LUER LOK 50CC

## (undated) DEVICE — SOL IRR POUR H2O 500ML

## (undated) DEVICE — VENODYNE/SCD SLEEVE CALF MEDIUM

## (undated) DEVICE — FOLEY CATH 3-WAY 22FR 30CC LATEX LUBRICATH

## (undated) DEVICE — SUT VICRYL PLUS 5-0 27" RB-1 UNDYED

## (undated) DEVICE — SOL IRR POUR H2O 250ML

## (undated) DEVICE — NDL INJ SCLERO INTERJECT 23G

## (undated) DEVICE — BLADE SURGICAL #15 CARBON

## (undated) DEVICE — SENSOR O2 FINGER ADULT

## (undated) DEVICE — POLY TRAP ETRAP

## (undated) DEVICE — CAM-ESU 1501367: Type: DURABLE MEDICAL EQUIPMENT

## (undated) DEVICE — WARMING BLANKET UPPER ADULT

## (undated) DEVICE — DRSG BENZOIN 0.6CC

## (undated) DEVICE — TUBING SUCTION 20FT

## (undated) DEVICE — SOL IRR POUR NS 0.9% 500ML

## (undated) DEVICE — FOLEY CATH 3-WAY 20FR 5CC LATEX LUBRICATH

## (undated) DEVICE — TUBING IRR SET FOR CYSTOSCOPY 77"

## (undated) DEVICE — SYR LUER LOK 30CC

## (undated) DEVICE — STAPLER SKIN VISI-STAT 35 WIDE

## (undated) DEVICE — FOLEY CATH 3-WAY 24FR 30CC LATEX LUBRICATH

## (undated) DEVICE — GLV 7 PROTEXIS (WHITE)

## (undated) DEVICE — POSITIONER FOAM EGG CRATE ULNAR 2PCS (PINK)

## (undated) DEVICE — CLAMP BX HOT RAD JAW 3

## (undated) DEVICE — NDL HYPO REGULAR BEVEL 25G X 1.5" (BLUE)

## (undated) DEVICE — SUT SOFSILK 2-0 18" C-23

## (undated) DEVICE — TUBING SUCTION NONCONDUCTIVE 6MM X 12FT

## (undated) DEVICE — VAGINAL PACKING 2"

## (undated) DEVICE — FRAZIER SUCTION TIP 8FR

## (undated) DEVICE — MARKING PEN W RULER

## (undated) DEVICE — SUT PDS II 5-0 27" RB-1

## (undated) DEVICE — DRAPE SPLIT SHEET 77" X 108"

## (undated) DEVICE — FOLEY CATH 2-WAY 16FR 5CC LATEX LUBRICATH

## (undated) DEVICE — FOLEY CATH 2-WAY 18FR 5CC LATEX COUNCIL RED

## (undated) DEVICE — DRAINAGE BAG URINARY 2L

## (undated) DEVICE — IRRIGATION TRAY W PISTON SYRINGE 60ML

## (undated) DEVICE — CATH IV SAFE BC 22G X 1" (BLUE)

## (undated) DEVICE — FOLEY CATH 3-WAY 18FR 30CC LATEX LUBRICATH

## (undated) DEVICE — SUT POLYSORB 4-0 30" CVF-23 UNDYED

## (undated) DEVICE — NDL COUNTER FOAM AND MAGNET 20-40

## (undated) DEVICE — DRAIN PENROSE .5" X 18" LATEX

## (undated) DEVICE — BALLOON US ENDO

## (undated) DEVICE — FORCEP RADIAL JAW 4 JUMBO 2.8MM 3.2MM 240CM ORANGE DISP

## (undated) DEVICE — GLV 8 PROTEXIS (WHITE)

## (undated) DEVICE — TUBING IV SET GRAVITY 3Y 100" MACRO

## (undated) DEVICE — NDL INJ SCLERO INTERJECT 25G

## (undated) DEVICE — LONE STAR ELASTIC STAY HOOK 20MM 3 FINGER RAKE

## (undated) DEVICE — ELCTR BOVIE PENCIL BLADE 10FT

## (undated) DEVICE — WARMING BLANKET FULL ADULT

## (undated) DEVICE — ELCTR PLASMA LOOP MEDIUM 24FR 12-16 DEG

## (undated) DEVICE — FOLEY CATH 3-WAY 26FR 30CC LATEX LUBRICATH

## (undated) DEVICE — SUT MONOCRYL 3-0 18" PS-2 UNDYED

## (undated) DEVICE — SPONGE DISSECTOR PEANUT

## (undated) DEVICE — CANISTER DISPOSABLE THIN WALL 3000CC

## (undated) DEVICE — BITE BLOCK ADULT 20 X 27MM (GREEN)

## (undated) DEVICE — FOLEY CATH 2-WAY 20F 5CC LATEX LUBRICATH

## (undated) DEVICE — SYR ALLIANCE II INFLATION 60ML

## (undated) DEVICE — PROBE FIAPC DIA 2.3MM/7FR LNTH 220CM/7.2FT

## (undated) DEVICE — POSITIONER STRAP ARMBOARD VELCRO TS-30

## (undated) DEVICE — SUT CAPIO SLIM CAPTURING DEVICE

## (undated) DEVICE — CATH IV SAFE BC 20G X 1.16" (PINK)

## (undated) DEVICE — SUT CAPIO 0 48" TC DA

## (undated) DEVICE — ENDOCUFF VISION SZ 2 LG GRN